# Patient Record
Sex: FEMALE | Race: OTHER | HISPANIC OR LATINO
[De-identification: names, ages, dates, MRNs, and addresses within clinical notes are randomized per-mention and may not be internally consistent; named-entity substitution may affect disease eponyms.]

---

## 2020-03-25 ENCOUNTER — NON-APPOINTMENT (OUTPATIENT)
Age: 60
End: 2020-03-25

## 2020-03-25 PROBLEM — Z00.00 ENCOUNTER FOR PREVENTIVE HEALTH EXAMINATION: Status: ACTIVE | Noted: 2020-03-25

## 2020-05-01 ENCOUNTER — APPOINTMENT (OUTPATIENT)
Dept: RHEUMATOLOGY | Facility: CLINIC | Age: 60
End: 2020-05-01
Payer: MEDICARE

## 2020-05-01 PROCEDURE — 99214 OFFICE O/P EST MOD 30 MIN: CPT | Mod: 95

## 2020-05-03 RX ORDER — OXYCODONE HYDROCHLORIDE 5 MG/1
CAPSULE ORAL
Refills: 0 | Status: ACTIVE | COMMUNITY

## 2020-05-03 NOTE — HISTORY OF PRESENT ILLNESS
[Home] : at home, [unfilled] , at the time of the visit. [Other Location: e.g. Home (Enter Location, City,State)___] : at [unfilled] [Patient] : the patient [Self] : self [FreeTextEntry1] : Discussed with patient.  You have chosen to receive care through the use of tele-media.  Telemedia enables health care providers at different locations to provide safe, effective, and convenient care through the use of technology.  Please note this is a billable encounter.  Patient understands that I cannot perform a physical examine and that patient may need to come to the clinic to complete the assessment.  Patient agreed verbally to to understanding the risks of benefits of telemedia as explained.\par \par 60 year old woman with history of RA, seen as follow Up reestablish care.  Patient was previously seen more than six months ago.  Since that time, has been following with pain management.  She continue plaquenil and sulfasalazine but not regularly.  Had some difficulty with refill of plaquenil due to use for coronavirus. \par \par Pain in the hands, shoulder pain\par Had accident about 1-2 months,  when something fell on her shoulder.  he was seen in urgent care, had xray, no fracture noted.  Still with pain in the right shoulder, feels pain with abduction of the right shoulder.  Sometimes hard to sleep, sometimes feels some paresthesias in the hands.  \par Has follow up with pain management Tuesday, will discuss the shoulder pain with doctor.  \par Started taking prednisone every other day which has helped\par Feels stiffness and pain in the hands and shoulders\par Patient waiting for approval for spinal stimulator for pain control. \par \par In past, patient was treated with rituxan and decadron (reaction to iv solumedrol), last dose more than one year ago.

## 2020-05-03 NOTE — PHYSICAL EXAM
[General Appearance - Alert] : alert [General Appearance - In No Acute Distress] : in no acute distress [General Appearance - Well Nourished] : well nourished [General Appearance - Well Developed] : well developed [General Appearance - Well-Appearing] : healthy appearing [] : no respiratory distress [Respiration, Rhythm And Depth] : normal respiratory rhythm and effort [Exaggerated Use Of Accessory Muscles For Inspiration] : no accessory muscle use [Oriented To Time, Place, And Person] : oriented to person, place, and time [Impaired Insight] : insight and judgment were intact [Affect] : the affect was normal [Mood] : the mood was normal [FreeTextEntry1] : pain with ROM of the right shoulder both abduction and adduction

## 2020-05-03 NOTE — ASSESSMENT
[FreeTextEntry1] : 60 year old woman with history of RA and ostearthritis, chronic back pain, present to reestablish via virtual visit due to current coronavirus pandemic.  Patient has been taking Plaquenil and sulfasalazine for RA, previously treated with rituximab, but not for about one year.  Feeling an increase in joint pains and stiffness.  Right shoulder pain since accident about 1-2 months ago, she will discuss with pain management next week.  Will continue plaquenil and sulfasalazine for now.  Continue low dose prednisone as well.  Patient will come in to office for blood tests next week.  Further management pending results.

## 2020-05-03 NOTE — REVIEW OF SYSTEMS
[Arthralgias] : arthralgias [Joint Pain] : joint pain [Joint Swelling] : joint swelling [Joint Stiffness] : joint stiffness [Negative] : Heme/Lymph

## 2020-05-11 ENCOUNTER — NON-APPOINTMENT (OUTPATIENT)
Age: 60
End: 2020-05-11

## 2020-05-15 ENCOUNTER — APPOINTMENT (OUTPATIENT)
Dept: RHEUMATOLOGY | Facility: CLINIC | Age: 60
End: 2020-05-15
Payer: MEDICARE

## 2020-05-15 DIAGNOSIS — Z13.1 ENCOUNTER FOR SCREENING FOR DIABETES MELLITUS: ICD-10-CM

## 2020-05-15 PROCEDURE — 36415 COLL VENOUS BLD VENIPUNCTURE: CPT

## 2020-05-16 ENCOUNTER — NON-APPOINTMENT (OUTPATIENT)
Age: 60
End: 2020-05-16

## 2020-05-18 LAB
ALBUMIN SERPL ELPH-MCNC: 4.3 G/DL
ALP BLD-CCNC: 114 U/L
ALT SERPL-CCNC: 31 U/L
ANION GAP SERPL CALC-SCNC: 12 MMOL/L
AST SERPL-CCNC: 27 U/L
BASOPHILS # BLD AUTO: 0.04 K/UL
BASOPHILS NFR BLD AUTO: 0.7 %
BILIRUB SERPL-MCNC: 0.4 MG/DL
BUN SERPL-MCNC: 13 MG/DL
CALCIUM SERPL-MCNC: 9.2 MG/DL
CCP AB SER IA-ACNC: <8 UNITS
CHLORIDE SERPL-SCNC: 104 MMOL/L
CO2 SERPL-SCNC: 25 MMOL/L
CREAT SERPL-MCNC: 0.83 MG/DL
CRP SERPL-MCNC: 0.2 MG/DL
EOSINOPHIL # BLD AUTO: 0.08 K/UL
EOSINOPHIL NFR BLD AUTO: 1.4 %
ERYTHROCYTE [SEDIMENTATION RATE] IN BLOOD BY WESTERGREN METHOD: 8 MM/HR
ESTIMATED AVERAGE GLUCOSE: 97 MG/DL
GLUCOSE SERPL-MCNC: 74 MG/DL
HBA1C MFR BLD HPLC: 5 %
HCT VFR BLD CALC: 41.9 %
HGB BLD-MCNC: 12.6 G/DL
IMM GRANULOCYTES NFR BLD AUTO: 0.2 %
LYMPHOCYTES # BLD AUTO: 2.7 K/UL
LYMPHOCYTES NFR BLD AUTO: 46.1 %
MAN DIFF?: NORMAL
MCHC RBC-ENTMCNC: 29 PG
MCHC RBC-ENTMCNC: 30.1 GM/DL
MCV RBC AUTO: 96.5 FL
MONOCYTES # BLD AUTO: 0.52 K/UL
MONOCYTES NFR BLD AUTO: 8.9 %
NEUTROPHILS # BLD AUTO: 2.51 K/UL
NEUTROPHILS NFR BLD AUTO: 42.7 %
PLATELET # BLD AUTO: 177 K/UL
POTASSIUM SERPL-SCNC: 4 MMOL/L
PROT SERPL-MCNC: 6.6 G/DL
RBC # BLD: 4.34 M/UL
RBC # FLD: 13.8 %
RF+CCP IGG SER-IMP: NEGATIVE
RHEUMATOID FACT SER QL: <10 IU/ML
SODIUM SERPL-SCNC: 141 MMOL/L
WBC # FLD AUTO: 5.86 K/UL

## 2020-05-19 ENCOUNTER — APPOINTMENT (OUTPATIENT)
Dept: RHEUMATOLOGY | Facility: CLINIC | Age: 60
End: 2020-05-19
Payer: MEDICARE

## 2020-05-19 DIAGNOSIS — E78.5 HYPERLIPIDEMIA, UNSPECIFIED: ICD-10-CM

## 2020-05-19 DIAGNOSIS — I10 ESSENTIAL (PRIMARY) HYPERTENSION: ICD-10-CM

## 2020-05-19 LAB
M TB IFN-G BLD-IMP: NEGATIVE
QUANTIFERON TB PLUS MITOGEN MINUS NIL: 6.71 IU/ML
QUANTIFERON TB PLUS NIL: 0.02 IU/ML
QUANTIFERON TB PLUS TB1 MINUS NIL: 0 IU/ML
QUANTIFERON TB PLUS TB2 MINUS NIL: 0 IU/ML

## 2020-05-19 PROCEDURE — 99441: CPT

## 2020-05-19 RX ORDER — ATORVASTATIN CALCIUM 80 MG/1
TABLET, FILM COATED ORAL
Refills: 0 | Status: ACTIVE | COMMUNITY

## 2020-05-20 NOTE — ASSESSMENT
[FreeTextEntry1] : 60 year old woman with history of RA and ostearthritis, chronic back pain,  Patient has been taking Plaquenil and sulfasalazine for RA, previously treated called to review blood tests results.  Reviewed blood test results with rituximab, but not for about one year.  Feeling an increase in joint pains and stiffness, restarted plaquenil 200 mg bid, labs reviewed, will restart sulfasalazine 500 mg bid.

## 2020-05-20 NOTE — HISTORY OF PRESENT ILLNESS
[FreeTextEntry1] : May 19, 2020\par Patient called to discuss blood test results..  Reviewed results with patient.  Patient was concerned about possible diabetes, Hemoglobin A 1c 5.0%.  Restarted hydroxychloroquine 200 mg bid, no side effects noted.\par Discussed with patient.  You have chosen to receive care through the use of tele-media.  Telemedia enables health care providers at different locations to provide safe, effective, and convenient care through the use of technology.  Please note this is a billable encounter.  Patient understands that I cannot perform a physical examine and that patient may need to come to the clinic to complete the assessment.  Patient agreed verbally to to understanding the risks of benefits of telemedia as explained.\par

## 2020-05-20 NOTE — PHYSICAL EXAM
[] : normal voice and communication [Oriented To Time, Place, And Person] : oriented to person, place, and time [Impaired Insight] : insight and judgment were intact [Mood] : the mood was normal [FreeTextEntry1] : Limited due to telephone visit.

## 2020-05-22 ENCOUNTER — APPOINTMENT (OUTPATIENT)
Dept: RHEUMATOLOGY | Facility: CLINIC | Age: 60
End: 2020-05-22

## 2020-05-23 ENCOUNTER — TRANSCRIPTION ENCOUNTER (OUTPATIENT)
Age: 60
End: 2020-05-23

## 2020-06-21 ENCOUNTER — RX RENEWAL (OUTPATIENT)
Age: 60
End: 2020-06-21

## 2020-06-21 RX ORDER — HYDROCHLOROTHIAZIDE 12.5 MG/1
12.5 TABLET ORAL DAILY
Qty: 30 | Refills: 0 | Status: ACTIVE | COMMUNITY
Start: 2020-06-21 | End: 1900-01-01

## 2020-07-28 ENCOUNTER — APPOINTMENT (OUTPATIENT)
Dept: RHEUMATOLOGY | Facility: CLINIC | Age: 60
End: 2020-07-28

## 2020-09-09 ENCOUNTER — APPOINTMENT (OUTPATIENT)
Dept: RHEUMATOLOGY | Facility: CLINIC | Age: 60
End: 2020-09-09
Payer: MEDICARE

## 2020-09-09 VITALS
OXYGEN SATURATION: 98 % | DIASTOLIC BLOOD PRESSURE: 84 MMHG | SYSTOLIC BLOOD PRESSURE: 134 MMHG | BODY MASS INDEX: 29.06 KG/M2 | TEMPERATURE: 98.4 F | HEIGHT: 60 IN | WEIGHT: 148 LBS | HEART RATE: 66 BPM

## 2020-09-09 DIAGNOSIS — Z87.891 PERSONAL HISTORY OF NICOTINE DEPENDENCE: ICD-10-CM

## 2020-09-09 PROCEDURE — 99214 OFFICE O/P EST MOD 30 MIN: CPT

## 2020-09-10 NOTE — REVIEW OF SYSTEMS
[Abdominal Pain] : abdominal pain [Arthralgias] : arthralgias [Negative] : Heme/Lymph [FreeTextEntry7] : scheduled for hernia repair [FreeTextEntry9] : back pain

## 2020-09-10 NOTE — HISTORY OF PRESENT ILLNESS
[FreeTextEntry1] : Discussed with patient.  You have chosen to receive care through the use of tele-media.  Telemedia enables health care providers at different locations to provide safe, effective, and convenient care through the use of technology.  Please note this is a billable encounter.  Patient understands that I cannot perform a physical examine and that patient may need to come to the clinic to complete the assessment.  Patient agreed verbally to to understanding the risks of benefits of telemedia as explained.\par \par 60 year old woman with history of RA, seen as follow Up reestablish care.  Patient was previously seen more than six months ago.  Since that time, has been following with pain management.  She continue Plaquenil and sulfasalazine but not regularly.  Had some difficulty with refill of Plaquenil due to use for coronavirus. \par \par Pain in the hands, shoulder pain\par Had accident about 1-2 months,  when something fell on her shoulder. was seen in urgent care, had xray, no fracture noted.  Still with pain in the right shoulder, feels pain with abduction of the right shoulder.  Sometimes hard to sleep, sometimes feels some paresthesias in the hands.  \par Has follow up with pain management Tuesday, will discuss the shoulder pain with doctor.  \par Started taking prednisone every other day which has helped\par Feels stiffness and pain in the hands and shoulders\par Patient waiting for approval for spinal stimulator for pain control. \par \par In past, patient was treated with rituxan and decadron (reaction to iv solumedrol), last dose more than one year ago.\par \par September 9, 2020\par Patient returns for follow up\par Patient feeling well n terms of joint pain and stiffness, however main concern related to low back pain.\par Patient was staying with her daughter's family in [place], was lifting her granddaughter and felt pain in the abdominal and back.  Was seen in emergency room, and needs hernia repair surgery, scheduled next week at [facility].  Had all imaging completed in [place], she jc froward results for review.  She will also have pre op clearance next week with her medical doctor.  Labs competed in [place], not available to me at this time. \par Continues Plaquenil and sulfasalazine, no side effects noted. \par Patient requesting refill for Chantix until able to see PMD next week.\par Patient may be relocating to [place] and requests records to be sent to rheumatology practice there.

## 2020-09-10 NOTE — PHYSICAL EXAM
[General Appearance - Alert] : alert [General Appearance - In No Acute Distress] : in no acute distress [General Appearance - Well-Appearing] : healthy appearing [Sclera] : the sclera and conjunctiva were normal [Respiration, Rhythm And Depth] : normal respiratory rhythm and effort [Exaggerated Use Of Accessory Muscles For Inspiration] : no accessory muscle use [Abnormal Walk] : normal gait [Nail Clubbing] : no clubbing  or cyanosis of the fingernails [Motor Tone] : muscle strength and tone were normal [] : no rash [Skin Lesions] : no skin lesions [Skin Color & Pigmentation] : normal skin color and pigmentation [Affect] : the affect was normal [Oriented To Time, Place, And Person] : oriented to person, place, and time [Impaired Insight] : insight and judgment were intact [Mood] : the mood was normal [FreeTextEntry1] : enlargement and deviation of the MCP bilaterally with good handgrip bilaterally.  +enlargement of the PIP without tenderness.  good ROM of the elbows and minimal decrease ROM in the shoulders bilaterally.  Back exam limited by pain related to hernia.  Normal gait, uses cane for balance.

## 2020-09-10 NOTE — ASSESSMENT
[FreeTextEntry1] : 60 year old woman with history of seronegative RA, ostearthritis, chronic back pain, returns for follow up.  Main concern related to recent development of abdominal hernia, scheduled for surgical repair September 18 at [facility], will see PMD for medical clearance, she will forward blood test results to office for review.  Patient restarted plaquenil and sulfasalazine in May 2020 with good response, feeling well in terms of joint pain and stiffness.  Patient continues to follow with pain management as well.  Patient requests prescription for chantix for smoking cessation until sees PMD.  Will forward records to rheumatology practice in [place] at patient's request.  She will also obtain previous records from [facility] as well. \par \par \par [phone] fax\par phone: [phone]\par \par formerly [facility]

## 2020-10-06 ENCOUNTER — RX RENEWAL (OUTPATIENT)
Age: 60
End: 2020-10-06

## 2020-12-14 ENCOUNTER — RX RENEWAL (OUTPATIENT)
Age: 60
End: 2020-12-14

## 2020-12-15 ENCOUNTER — APPOINTMENT (OUTPATIENT)
Dept: RHEUMATOLOGY | Facility: CLINIC | Age: 60
End: 2020-12-15
Payer: MEDICARE

## 2020-12-15 VITALS
HEIGHT: 60 IN | TEMPERATURE: 98.2 F | DIASTOLIC BLOOD PRESSURE: 89 MMHG | WEIGHT: 140 LBS | OXYGEN SATURATION: 98 % | SYSTOLIC BLOOD PRESSURE: 137 MMHG | HEART RATE: 61 BPM | BODY MASS INDEX: 27.48 KG/M2

## 2020-12-15 PROCEDURE — 36415 COLL VENOUS BLD VENIPUNCTURE: CPT

## 2020-12-15 PROCEDURE — 99072 ADDL SUPL MATRL&STAF TM PHE: CPT

## 2020-12-15 PROCEDURE — 99213 OFFICE O/P EST LOW 20 MIN: CPT | Mod: 25

## 2020-12-16 LAB
ALBUMIN SERPL ELPH-MCNC: 4.7 G/DL
ALP BLD-CCNC: 122 U/L
ALT SERPL-CCNC: 25 U/L
ANION GAP SERPL CALC-SCNC: 13 MMOL/L
AST SERPL-CCNC: 24 U/L
BASOPHILS # BLD AUTO: 0.03 K/UL
BASOPHILS NFR BLD AUTO: 0.4 %
BILIRUB SERPL-MCNC: 0.3 MG/DL
BUN SERPL-MCNC: 17 MG/DL
CALCIUM SERPL-MCNC: 9.8 MG/DL
CHLORIDE SERPL-SCNC: 103 MMOL/L
CO2 SERPL-SCNC: 27 MMOL/L
CREAT SERPL-MCNC: 1 MG/DL
CRP SERPL-MCNC: <0.1 MG/DL
EOSINOPHIL # BLD AUTO: 0.13 K/UL
EOSINOPHIL NFR BLD AUTO: 1.8 %
ERYTHROCYTE [SEDIMENTATION RATE] IN BLOOD BY WESTERGREN METHOD: 12 MM/HR
GLUCOSE SERPL-MCNC: 85 MG/DL
HCT VFR BLD CALC: 44.9 %
HGB BLD-MCNC: 14 G/DL
IMM GRANULOCYTES NFR BLD AUTO: 0.3 %
LYMPHOCYTES # BLD AUTO: 3.04 K/UL
LYMPHOCYTES NFR BLD AUTO: 42.5 %
MAN DIFF?: NORMAL
MCHC RBC-ENTMCNC: 29.1 PG
MCHC RBC-ENTMCNC: 31.2 GM/DL
MCV RBC AUTO: 93.3 FL
MONOCYTES # BLD AUTO: 0.7 K/UL
MONOCYTES NFR BLD AUTO: 9.8 %
NEUTROPHILS # BLD AUTO: 3.24 K/UL
NEUTROPHILS NFR BLD AUTO: 45.2 %
PLATELET # BLD AUTO: 194 K/UL
POTASSIUM SERPL-SCNC: 4.5 MMOL/L
PROT SERPL-MCNC: 7.4 G/DL
RBC # BLD: 4.81 M/UL
RBC # FLD: 14.4 %
SODIUM SERPL-SCNC: 143 MMOL/L
WBC # FLD AUTO: 7.16 K/UL

## 2020-12-23 NOTE — HISTORY OF PRESENT ILLNESS
[FreeTextEntry1] : December 15, 2020\par Patient returns for follow up. \par Patient feeling well, no joint pains today.  Major concern related to chronic back pain, follows with pain management\par Patient had surgery for hernia repair and tolerated well.\par Patient planning to relocate to [place] as well\par No recent changes in medications\par \par September 9, 2020\par Discussed with patient.  You have chosen to receive care through the use of tele-media.  Telemedia enables health care providers at different locations to provide safe, effective, and convenient care through the use of technology.  Please note this is a billable encounter.  Patient understands that I cannot perform a physical examine and that patient may need to come to the clinic to complete the assessment.  Patient agreed verbally to to understanding the risks of benefits of telemedia as explained.\par \par 60 year old woman with history of RA, seen as follow Up reestablish care.  Patient was previously seen more than six months ago.  Since that time, has been following with pain management.  She continue Plaquenil and sulfasalazine but not regularly.  Had some difficulty with refill of Plaquenil due to use for coronavirus. \par \par Pain in the hands, shoulder pain\par Had accident about 1-2 months,  when something fell on her shoulder. was seen in urgent care, had xray, no fracture noted.  Still with pain in the right shoulder, feels pain with abduction of the right shoulder.  Sometimes hard to sleep, sometimes feels some paresthesias in the hands.  \par Has follow up with pain management Tuesday, will discuss the shoulder pain with doctor.  \par Started taking prednisone every other day which has helped\par Feels stiffness and pain in the hands and shoulders\par Patient waiting for approval for spinal stimulator for pain control. \par \par In past, patient was treated with rituxan and decadron (reaction to iv solumedrol), last dose more than one year ago.\par \par September 9, 2020\par Patient returns for follow up\par Patient feeling well n terms of joint pain and stiffness, however main concern related to low back pain.\par Patient was staying with her daughter's family in [place], was lifting her granddaughter and felt pain in the abdominal and back.  Was seen in emergency room, and needs hernia repair surgery, scheduled next week at [facility].  Had all imaging completed in [place], she jc froward results for review.  She will also have pre op clearance next week with her medical doctor.  Labs competed in [place], not available to me at this time. \par Continues Plaquenil and sulfasalazine, no side effects noted. \par Patient requesting refill for Chantix until able to see PMD next week.\par Patient may be relocating to [place] and requests records to be sent to rheumatology practice there.

## 2020-12-23 NOTE — ASSESSMENT
[FreeTextEntry1] : 60 year old woman with history of seronegative RA, ostearthritis, chronic back pain, returns for follow up.   Patient restarted plaquenil and sulfasalazine in May 2020 with good response, feeling well in terms of joint pain and stiffness.  Patient continues to follow with pain management for back pain as well.  Will repeat labs today.  Will continue plaquenil 200 mg qday and sulfasalazine 500 mg bid as well.  Patient is planning to relocate to [place] and will forward records to rheumatology practice in [place] at patient's request.  She will also obtain previous records from [place] as well. \par \par [phone] fax\par phone: [phone]\par [facility]

## 2020-12-23 NOTE — PHYSICAL EXAM
[General Appearance - Alert] : alert [General Appearance - In No Acute Distress] : in no acute distress [General Appearance - Well-Appearing] : healthy appearing [Respiration, Rhythm And Depth] : normal respiratory rhythm and effort [Exaggerated Use Of Accessory Muscles For Inspiration] : no accessory muscle use [Abnormal Walk] : normal gait [Nail Clubbing] : no clubbing  or cyanosis of the fingernails [Motor Tone] : muscle strength and tone were normal [] : no rash [Skin Lesions] : no skin lesions [Oriented To Time, Place, And Person] : oriented to person, place, and time [Impaired Insight] : insight and judgment were intact [Affect] : the affect was normal [Mood] : the mood was normal [FreeTextEntry1] : enlargement and deviation of the MCP bilaterally with good handgrip bilaterally.  +enlargement of the PIP without tenderness.  Good ROM of the elbows and minimal decrease ROM in the shoulders bilaterally.  Normal gait, uses cane for balance.

## 2021-01-05 ENCOUNTER — APPOINTMENT (OUTPATIENT)
Dept: GASTROENTEROLOGY | Facility: CLINIC | Age: 61
End: 2021-01-05

## 2021-02-23 ENCOUNTER — APPOINTMENT (OUTPATIENT)
Dept: RHEUMATOLOGY | Facility: CLINIC | Age: 61
End: 2021-02-23
Payer: MEDICARE

## 2021-02-23 VITALS
DIASTOLIC BLOOD PRESSURE: 72 MMHG | SYSTOLIC BLOOD PRESSURE: 124 MMHG | WEIGHT: 148 LBS | OXYGEN SATURATION: 98 % | BODY MASS INDEX: 29.06 KG/M2 | HEART RATE: 69 BPM | HEIGHT: 60 IN | TEMPERATURE: 98.1 F

## 2021-02-23 PROCEDURE — 99072 ADDL SUPL MATRL&STAF TM PHE: CPT

## 2021-02-23 PROCEDURE — 99213 OFFICE O/P EST LOW 20 MIN: CPT

## 2021-02-24 NOTE — HISTORY OF PRESENT ILLNESS
[FreeTextEntry1] : February 23, 2021\par Seen by new pain management doctor\par Has MRI scheduled for the cervical and lumbar spine\par Had reaction to steroid with last epidural, developed high blood pressure and headaches\par Mostly the neck and left upper extremity\par Patient is not interested in surgery at this time\par Joint pain controlled at this time\par \par December 15, 2020\par Patient returns for follow up. \par Patient feeling well, no joint pains today.  Major concern related to chronic back pain, follows with pain management\par Patient had surgery for hernia repair and tolerated well.\par Patient planning to relocate to [place] as well\par No recent changes in medications\par \par September 9, 2020\par Discussed with patient.  You have chosen to receive care through the use of tele-media.  Telemedia enables health care providers at different locations to provide safe, effective, and convenient care through the use of technology.  Please note this is a billable encounter.  Patient understands that I cannot perform a physical examine and that patient may need to come to the clinic to complete the assessment.  Patient agreed verbally to to understanding the risks of benefits of telemedia as explained.\par \par 60 year old woman with history of RA, seen as follow Up reestablish care.  Patient was previously seen more than six months ago.  Since that time, has been following with pain management.  She continue Plaquenil and sulfasalazine but not regularly.  Had some difficulty with refill of Plaquenil due to use for coronavirus. \par \par Pain in the hands, shoulder pain\par Had accident about 1-2 months,  when something fell on her shoulder. was seen in urgent care, had xray, no fracture noted.  Still with pain in the right shoulder, feels pain with abduction of the right shoulder.  Sometimes hard to sleep, sometimes feels some paresthesias in the hands.  \par Has follow up with pain management Tuesday, will discuss the shoulder pain with doctor.  \par Started taking prednisone every other day which has helped\par Feels stiffness and pain in the hands and shoulders\par Patient waiting for approval for spinal stimulator for pain control. \par \par In past, patient was treated with rituxan and decadron (reaction to iv solumedrol), last dose more than one year ago.\par \par September 9, 2020\par Patient returns for follow up\par Patient feeling well n terms of joint pain and stiffness, however main concern related to low back pain.\par Patient was staying with her daughter's family in [place], was lifting her granddaughter and felt pain in the abdominal and back.  Was seen in emergency room, and needs hernia repair surgery, scheduled next week at [facility].  Had all imaging completed in [place], she jc froward results for review.  She will also have pre op clearance next week with her medical doctor.  Labs competed in [place], not available to me at this time. \par Continues Plaquenil and sulfasalazine, no side effects noted. \par Patient requesting refill for Chantix until able to see PMD next week.\par Patient may be relocating to [place] and requests records to be sent to rheumatology practice there.

## 2021-02-24 NOTE — PHYSICAL EXAM
[General Appearance - Alert] : alert [General Appearance - In No Acute Distress] : in no acute distress [General Appearance - Well Nourished] : well nourished [General Appearance - Well Developed] : well developed [General Appearance - Well-Appearing] : healthy appearing [Sclera] : the sclera and conjunctiva were normal [Respiration, Rhythm And Depth] : normal respiratory rhythm and effort [Exaggerated Use Of Accessory Muscles For Inspiration] : no accessory muscle use [] : no rash [Skin Lesions] : no skin lesions [Oriented To Time, Place, And Person] : oriented to person, place, and time [Impaired Insight] : insight and judgment were intact [Affect] : the affect was normal [Mood] : the mood was normal [FreeTextEntry1] : ambulating with cane.

## 2021-05-20 ENCOUNTER — APPOINTMENT (OUTPATIENT)
Dept: RHEUMATOLOGY | Facility: CLINIC | Age: 61
End: 2021-05-20
Payer: MEDICARE

## 2021-05-20 VITALS
HEART RATE: 59 BPM | WEIGHT: 146 LBS | BODY MASS INDEX: 28.66 KG/M2 | SYSTOLIC BLOOD PRESSURE: 133 MMHG | OXYGEN SATURATION: 98 % | HEIGHT: 60 IN | TEMPERATURE: 97.8 F | DIASTOLIC BLOOD PRESSURE: 78 MMHG

## 2021-05-20 PROCEDURE — 99213 OFFICE O/P EST LOW 20 MIN: CPT

## 2021-05-22 NOTE — HISTORY OF PRESENT ILLNESS
[FreeTextEntry1] : May 20, 2021\par Pain in the right shoulder, for the past four months\par Feels hard to sleep\par Painful, seeing pain management, lowered dosage of pain medications which feels may also have made the pain worse.\par Feels pain in the spine and the shoulder \par Requesting MRI for further evaluation of the right shoulder.\par Had colonoscopy, three polyps, follow up in 6 months to 1 year\par \par Following closely with GI, worsening GI symptoms lately, delayed gastric emptying of both liquids and solids\par Worsening GERD\par Feels unable to sleep \par Unable to digest pills\par Right hand dominant\par Had shoulder xray 6-7 months ago, was in store when dropped something on the floor, bent down to pick something up and a display fell on the right shoulders, went to urgent care, no fractures at that time\par Was also told something abnormal on EKG and needs to follow with her cardiologist as well. \par \par February 23, 2021\par Seen by new pain management doctor\par Has MRI scheduled for the cervical and lumbar spine\par Had reaction to steroid with last epidural, developed high blood pressure and headaches\par Mostly the neck and left upper extremity\par Patient is not interested in surgery at this time\par Joint pain controlled at this time\par \par December 15, 2020\par Patient returns for follow up. \par Patient feeling well, no joint pains today.  Major concern related to chronic back pain, follows with pain management\par Patient had surgery for hernia repair and tolerated well.\par Patient planning to relocate to [place] as well\par No recent changes in medications\par \par September 9, 2020\par Discussed with patient.  You have chosen to receive care through the use of tele-media.  Telemedia enables health care providers at different locations to provide safe, effective, and convenient care through the use of technology.  Please note this is a billable encounter.  Patient understands that I cannot perform a physical examine and that patient may need to come to the clinic to complete the assessment.  Patient agreed verbally to to understanding the risks of benefits of telemedia as explained.\par \par 60 year old woman with history of RA, seen as follow Up reestablish care.  Patient was previously seen more than six months ago.  Since that time, has been following with pain management.  She continue Plaquenil and sulfasalazine but not regularly.  Had some difficulty with refill of Plaquenil due to use for coronavirus. \par \par Pain in the hands, shoulder pain\par Had accident about 1-2 months,  when something fell on her shoulder. was seen in urgent care, had xray, no fracture noted.  Still with pain in the right shoulder, feels pain with abduction of the right shoulder.  Sometimes hard to sleep, sometimes feels some paresthesias in the hands.  \par Has follow up with pain management Tuesday, will discuss the shoulder pain with doctor.  \par Started taking prednisone every other day which has helped\par Feels stiffness and pain in the hands and shoulders\par Patient waiting for approval for spinal stimulator for pain control. \par \par In past, patient was treated with rituxan and decadron (reaction to iv solumedrol), last dose more than one year ago.\par \par September 9, 2020\par Patient returns for follow up\par Patient feeling well n terms of joint pain and stiffness, however main concern related to low back pain.\par Patient was staying with her daughter's family in [place], was lifting her granddaughter and felt pain in the abdominal and back.  Was seen in emergency room, and needs hernia repair surgery, scheduled next week at [facility].  Had all imaging completed in [place], she jc froward results for review.  She will also have pre op clearance next week with her medical doctor.  Labs competed in [place], not available to me at this time. \par Continues Plaquenil and sulfasalazine, no side effects noted. \par Patient requesting refill for Chantix until able to see PMD next week.\par Patient may be relocating to [place] and requests records to be sent to rheumatology practice there.

## 2021-05-22 NOTE — ASSESSMENT
[FreeTextEntry1] : 61 year old woman with history of seronegative RA, ostearthritis, chronic back pain, returns for follow up.   At this time, patient main concern related to worsening right shoulder pain, started following injury about six months ago which has not improved.  Will check MRI of the right shoulder to rule out internal derangement.  Patient restarted plaquenil and sulfasalazine in May 2020 with good response, but now feels not digesting tablets due to GI issues.  Will hold medications for now, if arthritis becomes active, will change to injectable methotrexate. \par \par [phone] fax\par phone: [phone]\par [facility]

## 2021-05-22 NOTE — REVIEW OF SYSTEMS
[Abdominal Pain] : abdominal pain [Vomiting] : vomiting [Joint Pain] : joint pain [Negative] : Respiratory [FreeTextEntry9] : right shoulder

## 2021-05-22 NOTE — PHYSICAL EXAM
[Abnormal Walk] : normal gait [Nail Clubbing] : no clubbing  or cyanosis of the fingernails [FreeTextEntry1] : right shoulder with decreased ROM

## 2021-07-09 ENCOUNTER — APPOINTMENT (OUTPATIENT)
Dept: RHEUMATOLOGY | Facility: CLINIC | Age: 61
End: 2021-07-09

## 2021-07-21 ENCOUNTER — NON-APPOINTMENT (OUTPATIENT)
Age: 61
End: 2021-07-21

## 2021-07-21 ENCOUNTER — APPOINTMENT (OUTPATIENT)
Dept: RHEUMATOLOGY | Facility: CLINIC | Age: 61
End: 2021-07-21
Payer: MEDICARE

## 2021-07-21 VITALS
DIASTOLIC BLOOD PRESSURE: 77 MMHG | OXYGEN SATURATION: 95 % | BODY MASS INDEX: 28.47 KG/M2 | HEART RATE: 63 BPM | SYSTOLIC BLOOD PRESSURE: 112 MMHG | TEMPERATURE: 98.2 F | HEIGHT: 60 IN | WEIGHT: 145 LBS

## 2021-07-21 PROCEDURE — 99214 OFFICE O/P EST MOD 30 MIN: CPT | Mod: 25

## 2021-07-21 NOTE — ASSESSMENT
[FreeTextEntry1] : 61 year old woman with history of seronegative RA, ostearthritis, chronic back pain, returns for follow up.  At this time, patient's main concern related to worsening right shoulder pain, with recent MRI completed.  MRI of the right shoulder with SLAP 2 tear and low to moderate grade partial thickness tear of the infraspinatus tendon with mild bursitis, following closely with orthopedist, s/p steroid injection with limited benefit, starting PT.  Patient would like to restart plaquenil and sulfasalazine, now as GI issues resolved following recent procedure. Will update labs today in office, will check CBC, CMP, ESR, and CRP today. \par

## 2021-07-21 NOTE — PHYSICAL EXAM
[General Appearance - Alert] : alert [General Appearance - In No Acute Distress] : in no acute distress [General Appearance - Well-Appearing] : healthy appearing [Sclera] : the sclera and conjunctiva were normal [Respiration, Rhythm And Depth] : normal respiratory rhythm and effort [Exaggerated Use Of Accessory Muscles For Inspiration] : no accessory muscle use [Abnormal Walk] : normal gait [] : no rash [Skin Lesions] : no skin lesions [Oriented To Time, Place, And Person] : oriented to person, place, and time [Impaired Insight] : insight and judgment were intact [Affect] : the affect was normal [FreeTextEntry1] : ambulaters with cane.  Bilateral hands with minimal MCP subluxation, right more than left without tenderness over the MCP or PIP.  Decreased ROM of the right shoulder, good ROM of the left shoulder.

## 2021-07-21 NOTE — HISTORY OF PRESENT ILLNESS
[FreeTextEntry1] : July 21, 2021\par Had shoulder injection on the right, limited benefit\par Feels pain with ROM and with sleeping on the right side, feels worse at night\par Now taking short course of prednisone with unclear benefit\par Was seen seen in [address] [place] [address] by orthopedist on July 16th for second opinion\par Will start PT and if not improved, will consider surgery\par Had lap band replaced about two weeks ago via laparoscopic procedure at [facility]\par Feeling much better in terms of GI symptoms, able to tolerate food at this time\par \par May 20, 2021\par Pain in the right shoulder, for the past four months\par Feels hard to sleep\par Painful, seeing pain management, lowered dosage of pain medications which feels may also have made the pain worse.\par Feels pain in the spine and the shoulder \par Requesting MRI for further evaluation of the right shoulder.\par Had colonoscopy, three polyps, follow up in 6 months to 1 year\par \par Following closely with GI, worsening GI symptoms lately, delayed gastric emptying of both liquids and solids\par Worsening GERD\par Feels unable to sleep \par Unable to digest pills\par Right hand dominant\par Had shoulder xray 6-7 months ago, was in store when dropped something on the floor, bent down to pick something up and a display fell on the right shoulders, went to urgent care, no fractures at that time\par Was also told something abnormal on EKG and needs to follow with her cardiologist as well. \par \par February 23, 2021\par Seen by new pain management doctor\par Has MRI scheduled for the cervical and lumbar spine\par Had reaction to steroid with last epidural, developed high blood pressure and headaches\par Mostly the neck and left upper extremity\par Patient is not interested in surgery at this time\par Joint pain controlled at this time\par \par December 15, 2020\par Patient returns for follow up. \par Patient feeling well, no joint pains today.  Major concern related to chronic back pain, follows with pain management\par Patient had surgery for hernia repair and tolerated well.\par Patient planning to relocate to [place] as well\par No recent changes in medications\par \par September 9, 2020\par Discussed with patient.  You have chosen to receive care through the use of tele-media.  Telemedia enables health care providers at different locations to provide safe, effective, and convenient care through the use of technology.  Please note this is a billable encounter.  Patient understands that I cannot perform a physical examine and that patient may need to come to the clinic to complete the assessment.  Patient agreed verbally to to understanding the risks of benefits of telemedia as explained.\par \par 60 year old woman with history of RA, seen as follow Up reestablish care.  Patient was previously seen more than six months ago.  Since that time, has been following with pain management.  She continue Plaquenil and sulfasalazine but not regularly.  Had some difficulty with refill of Plaquenil due to use for coronavirus. \par \par Pain in the hands, shoulder pain\par Had accident about 1-2 months,  when something fell on her shoulder. was seen in urgent care, had xray, no fracture noted.  Still with pain in the right shoulder, feels pain with abduction of the right shoulder.  Sometimes hard to sleep, sometimes feels some paresthesias in the hands.  \par Has follow up with pain management Tuesday, will discuss the shoulder pain with doctor.  \par Started taking prednisone every other day which has helped\par Feels stiffness and pain in the hands and shoulders\par Patient waiting for approval for spinal stimulator for pain control. \par \par In past, patient was treated with rituxan and decadron (reaction to iv solumedrol), last dose more than one year ago.\par \par September 9, 2020\par Patient returns for follow up\par Patient feeling well n terms of joint pain and stiffness, however main concern related to low back pain.\par Patient was staying with her daughter's family in [place], was lifting her granddaughter and felt pain in the abdominal and back.  Was seen in emergency room, and needs hernia repair surgery, scheduled next week at [facility].  Had all imaging completed in [place], she jc [facility] results for review.  She will also have pre op clearance next week with her medical doctor.  Labs competed in [place], not available to me at this time. \par Continues Plaquenil and sulfasalazine, no side effects noted. \par Patient requesting refill for Chantix until able to see PMD next week.\par Patient may be relocating to [place] and requests records to be sent to rheumatology practice there.

## 2021-07-22 LAB
ALBUMIN SERPL ELPH-MCNC: 4.6 G/DL
ALP BLD-CCNC: 116 U/L
ALT SERPL-CCNC: 26 U/L
ANION GAP SERPL CALC-SCNC: 14 MMOL/L
AST SERPL-CCNC: 29 U/L
BASOPHILS # BLD AUTO: 0.05 K/UL
BASOPHILS NFR BLD AUTO: 0.6 %
BILIRUB SERPL-MCNC: 0.4 MG/DL
BUN SERPL-MCNC: 13 MG/DL
CALCIUM SERPL-MCNC: 9.9 MG/DL
CHLORIDE SERPL-SCNC: 107 MMOL/L
CO2 SERPL-SCNC: 23 MMOL/L
CREAT SERPL-MCNC: 0.72 MG/DL
CRP SERPL-MCNC: <3 MG/L
EOSINOPHIL # BLD AUTO: 0.35 K/UL
EOSINOPHIL NFR BLD AUTO: 4.2 %
ERYTHROCYTE [SEDIMENTATION RATE] IN BLOOD BY WESTERGREN METHOD: 5 MM/HR
GLUCOSE SERPL-MCNC: 83 MG/DL
HCT VFR BLD CALC: 43.8 %
HGB BLD-MCNC: 13.4 G/DL
IMM GRANULOCYTES NFR BLD AUTO: 0.2 %
LYMPHOCYTES # BLD AUTO: 3.77 K/UL
LYMPHOCYTES NFR BLD AUTO: 45.1 %
MAN DIFF?: NORMAL
MCHC RBC-ENTMCNC: 29.3 PG
MCHC RBC-ENTMCNC: 30.6 GM/DL
MCV RBC AUTO: 95.8 FL
MONOCYTES # BLD AUTO: 0.67 K/UL
MONOCYTES NFR BLD AUTO: 8 %
NEUTROPHILS # BLD AUTO: 3.49 K/UL
NEUTROPHILS NFR BLD AUTO: 41.9 %
PLATELET # BLD AUTO: 188 K/UL
POTASSIUM SERPL-SCNC: 4 MMOL/L
PROT SERPL-MCNC: 7 G/DL
RBC # BLD: 4.57 M/UL
RBC # FLD: 14.7 %
SODIUM SERPL-SCNC: 144 MMOL/L
WBC # FLD AUTO: 8.35 K/UL

## 2021-10-20 ENCOUNTER — APPOINTMENT (OUTPATIENT)
Dept: RHEUMATOLOGY | Facility: CLINIC | Age: 61
End: 2021-10-20
Payer: MEDICARE

## 2021-10-20 VITALS
HEART RATE: 68 BPM | BODY MASS INDEX: 29.25 KG/M2 | OXYGEN SATURATION: 96 % | TEMPERATURE: 97.7 F | DIASTOLIC BLOOD PRESSURE: 83 MMHG | SYSTOLIC BLOOD PRESSURE: 133 MMHG | WEIGHT: 149 LBS | HEIGHT: 60 IN

## 2021-10-20 PROCEDURE — 99213 OFFICE O/P EST LOW 20 MIN: CPT

## 2021-10-20 RX ORDER — VARENICLINE TARTRATE 1 MG/1
1 TABLET, FILM COATED ORAL TWICE DAILY
Qty: 28 | Refills: 0 | Status: COMPLETED | COMMUNITY
Start: 2020-09-09 | End: 2021-10-20

## 2021-10-20 NOTE — DATA REVIEWED
[FreeTextEntry1] : Labs from PMD visit:\par 10/15/2021\par Vitamin D 15.6\par CMP AST 25, ALT 40\par CBC: normla\par TSH 2.02

## 2021-10-20 NOTE — ASSESSMENT
[FreeTextEntry1] : 61 year old woman with history of seronegative RA, ostearthritis, chronic back pain, returns for follow up.  At this time, patient's main concern related to right shoulder pain s/p recent right shoulder surgery at [facility] in early September.  Still with decreased ROM of the right shoulder secondary to pain, not currently in PT as felt worsened her pain, orthopedist also discussed possible shoulder replacement in the future with patient.  Patient restarted plaquenil and sulfasalazine following last visit, no GI side effects noted. Will continue current regimen, follow up with pain management.  Recent labs reviewed with patient on phone.  Patient will follow up in three months or sooner as needed if symptoms change or worsen.\par

## 2021-10-20 NOTE — PHYSICAL EXAM
[General Appearance - Alert] : alert [General Appearance - In No Acute Distress] : in no acute distress [General Appearance - Well-Appearing] : healthy appearing [Sclera] : the sclera and conjunctiva were normal [Respiration, Rhythm And Depth] : normal respiratory rhythm and effort [Exaggerated Use Of Accessory Muscles For Inspiration] : no accessory muscle use [Abnormal Walk] : normal gait [Nail Clubbing] : no clubbing  or cyanosis of the fingernails [Musculoskeletal - Swelling] : no joint swelling seen [] : no rash [Skin Lesions] : no skin lesions [Oriented To Time, Place, And Person] : oriented to person, place, and time [Impaired Insight] : insight and judgment were intact [Affect] : the affect was normal [FreeTextEntry1] : ambulates with cane.  Decreased RoM of the right shoulder.  enlargement of the right MCPs with some subluxation of the right more than left.

## 2021-10-20 NOTE — HISTORY OF PRESENT ILLNESS
[FreeTextEntry1] : October 20, 2021\par Had blood tests completed, annual exam last week with PMD\par Had flu vaccine with PMD\par Scheduled for October 25 for Covid booster\par Had right shoulder early September 2021 at [facility]\par No PT at present following surgery as felt the PT made the pan worse, continues home exercises as tolerated\par No changes in medications since seeing medical doctor except added vitamin d \par Plaquenil 200 mg bid\par Sees pain management, had an epidural which helped a little bit, doesn't last too long\par Walking for exercise, about 20 blocks per day, on good, otherwise about 8-10\par Mammogram scheduled\par \par July 21, 2021\par Had shoulder injection on the right, limited benefit\par Feels pain with ROM and with sleeping on the right side, feels worse at night\par Now taking short course of prednisone with unclear benefit\par Was seen seen in [address] [place] [address] by orthopedist on July 16th for second opinion\par Will start PT and if not improved, will consider surgery\par Had lap band replaced about two weeks ago via laparoscopic procedure at [facility]\par Feeling much better in terms of GI symptoms, able to tolerate food at this time\par \par May 20, 2021\par Pain in the right shoulder, for the past four months\par Feels hard to sleep\par Painful, seeing pain management, lowered dosage of pain medications which feels may also have made the pain worse.\par Feels pain in the spine and the shoulder \par Requesting MRI for further evaluation of the right shoulder.\par Had colonoscopy, three polyps, follow up in 6 months to 1 year\par \par Following closely with GI, worsening GI symptoms lately, delayed gastric emptying of both liquids and solids\par Worsening GERD\par Feels unable to sleep \par Unable to digest pills\par Right hand dominant\par Had shoulder xray 6-7 months ago, was in store when dropped something on the floor, bent down to pick something up and a display fell on the right shoulders, went to urgent care, no fractures at that time\par Was also told something abnormal on EKG and needs to follow with her cardiologist as well. \par \par February 23, 2021\par Seen by new pain management doctor\par Has MRI scheduled for the cervical and lumbar spine\par Had reaction to steroid with last epidural, developed high blood pressure and headaches\par Mostly the neck and left upper extremity\par Patient is not interested in surgery at this time\par Joint pain controlled at this time\par \par December 15, 2020\par Patient returns for follow up. \par Patient feeling well, no joint pains today.  Major concern related to chronic back pain, follows with pain management\par Patient had surgery for hernia repair and tolerated well.\par Patient planning to relocate to [place] as well\par No recent changes in medications\par \par September 9, 2020\par Discussed with patient.  You have chosen to receive care through the use of tele-media.  Telemedia enables health care providers at different locations to provide safe, effective, and convenient care through the use of technology.  Please note this is a billable encounter.  Patient understands that I cannot perform a physical examine and that patient may need to come to the clinic to complete the assessment.  Patient agreed verbally to to understanding the risks of benefits of telemedia as explained.\par \par 60 year old woman with history of RA, seen as follow Up reestablish care.  Patient was previously seen more than six months ago.  Since that time, has been following with pain management.  She continue Plaquenil and sulfasalazine but not regularly.  Had some difficulty with refill of Plaquenil due to use for coronavirus. \par \par Pain in the hands, shoulder pain\par Had accident about 1-2 months,  when something fell on her shoulder. was seen in urgent care, had xray, no fracture noted.  Still with pain in the right shoulder, feels pain with abduction of the right shoulder.  Sometimes hard to sleep, sometimes feels some paresthesias in the hands.  \par Has follow up with pain management Tuesday, will discuss the shoulder pain with doctor.  \par Started taking prednisone every other day which has helped\par Feels stiffness and pain in the hands and shoulders\par Patient waiting for approval for spinal stimulator for pain control. \par \par In past, patient was treated with rituxan and decadron (reaction to iv solumedrol), last dose more than one year ago.\par \par September 9, 2020\par Patient returns for follow up\par Patient feeling well n terms of joint pain and stiffness, however main concern related to low back pain.\par Patient was staying with her daughter's family in [place], was lifting her granddaughter and felt pain in the abdominal and back.  Was seen in emergency room, and needs hernia repair surgery, scheduled next week at [facility].  Had all imaging completed in [place], she jc [facility] results for review.  She will also have pre op clearance next week with her medical doctor.  Labs competed in [place], not available to me at this time. \par Continues Plaquenil and sulfasalazine, no side effects noted. \par Patient requesting refill for Chantix until able to see PMD next week.\par Patient may be relocating to [place] and requests records to be sent to rheumatology practice there.

## 2022-01-13 RX ORDER — PREDNISONE 10 MG/1
10 TABLET ORAL
Qty: 14 | Refills: 0 | Status: ACTIVE | COMMUNITY
Start: 2022-01-13 | End: 1900-01-01

## 2022-01-18 ENCOUNTER — APPOINTMENT (OUTPATIENT)
Dept: RHEUMATOLOGY | Facility: CLINIC | Age: 62
End: 2022-01-18

## 2022-01-26 ENCOUNTER — APPOINTMENT (OUTPATIENT)
Dept: RHEUMATOLOGY | Facility: CLINIC | Age: 62
End: 2022-01-26
Payer: MEDICARE

## 2022-01-26 ENCOUNTER — LABORATORY RESULT (OUTPATIENT)
Age: 62
End: 2022-01-26

## 2022-01-26 VITALS
TEMPERATURE: 97.9 F | WEIGHT: 157 LBS | DIASTOLIC BLOOD PRESSURE: 82 MMHG | SYSTOLIC BLOOD PRESSURE: 132 MMHG | HEIGHT: 60 IN | BODY MASS INDEX: 30.82 KG/M2 | HEART RATE: 82 BPM | OXYGEN SATURATION: 96 %

## 2022-01-26 PROCEDURE — 99213 OFFICE O/P EST LOW 20 MIN: CPT | Mod: 25

## 2022-01-26 RX ORDER — ERGOCALCIFEROL (VITAMIN D2) 1250 MCG
50000 CAPSULE ORAL
Refills: 0 | Status: ACTIVE | COMMUNITY

## 2022-01-26 NOTE — ASSESSMENT
[FreeTextEntry1] : 61 year old woman with history of seronegative RA, ostearthritis, chronic back pain, returns for follow up.  At this time, patient's main concern related to right shoulder pain s/p recent right shoulder surgery at [facility] in early September 2021, scheduled for injection in March 2022, still with decreased ROM of the right shoulder secondary to pain, not currently in PT as felt worsened her pain, orthopedist also discussed possible need for shoulder replacement in the future with patient. Patient concerned about increasing symptoms of arthritis, will check vectra DA today, will discuss change in regimen pending results,  Will also check CBC, ESR, CRP, RF, and anti CCP. today.  Given severe vitamin D deficiency, will also check B12 as well.  Patient continues plaquenil and sulfasalazine, no GI side effects noted. Will continue current regimen pending vectra results. Follow up with pain management.  Recent labs reviewed with patient on phone.  Patient will follow up in three months or sooner as needed if symptoms change or worsen.\par

## 2022-01-26 NOTE — PHYSICAL EXAM
[General Appearance - Alert] : alert [General Appearance - In No Acute Distress] : in no acute distress [General Appearance - Well-Appearing] : healthy appearing [Sclera] : the sclera and conjunctiva were normal [Respiration, Rhythm And Depth] : normal respiratory rhythm and effort [Exaggerated Use Of Accessory Muscles For Inspiration] : no accessory muscle use [] : no rash [Oriented To Time, Place, And Person] : oriented to person, place, and time [Impaired Insight] : insight and judgment were intact [Affect] : the affect was normal [FreeTextEntry1] : Ambulates with cane.  Decreased handgrip strength bilaterally. edema of the MCPS bilaterally.   Decreased ROm of the right shoulder

## 2022-01-26 NOTE — DATA REVIEWED
[FreeTextEntry1] : Labs from PMD visit:\par 10/15/2021\par Vitamin D 15.6\par CMP AST 25, ALT 40\par CBC: normal\par TSH 2.02\par \par 1/14/2022\par Vitamin 8.6\par cmp normal except alt 40\par ast 22\par

## 2022-01-27 LAB
BASOPHILS # BLD AUTO: 0.04 K/UL
BASOPHILS NFR BLD AUTO: 0.4 %
CRP SERPL-MCNC: <3 MG/L
EOSINOPHIL # BLD AUTO: 0.07 K/UL
EOSINOPHIL NFR BLD AUTO: 0.7 %
ERYTHROCYTE [SEDIMENTATION RATE] IN BLOOD BY WESTERGREN METHOD: 15 MM/HR
HCT VFR BLD CALC: 43 %
HGB BLD-MCNC: 13.4 G/DL
IMM GRANULOCYTES NFR BLD AUTO: 0.2 %
LYMPHOCYTES # BLD AUTO: 3.36 K/UL
LYMPHOCYTES NFR BLD AUTO: 35 %
MAN DIFF?: NORMAL
MCHC RBC-ENTMCNC: 30 PG
MCHC RBC-ENTMCNC: 31.2 GM/DL
MCV RBC AUTO: 96.2 FL
MONOCYTES # BLD AUTO: 0.71 K/UL
MONOCYTES NFR BLD AUTO: 7.4 %
NEUTROPHILS # BLD AUTO: 5.39 K/UL
NEUTROPHILS NFR BLD AUTO: 56.3 %
PLATELET # BLD AUTO: 193 K/UL
RBC # BLD: 4.47 M/UL
RBC # FLD: 13.6 %
RHEUMATOID FACT SER QL: <10 IU/ML
WBC # FLD AUTO: 9.59 K/UL

## 2022-01-28 LAB
CCP AB SER IA-ACNC: <8 UNITS
RF+CCP IGG SER-IMP: NEGATIVE

## 2022-03-07 ENCOUNTER — APPOINTMENT (OUTPATIENT)
Dept: RHEUMATOLOGY | Facility: CLINIC | Age: 62
End: 2022-03-07

## 2022-05-16 ENCOUNTER — APPOINTMENT (OUTPATIENT)
Dept: RHEUMATOLOGY | Facility: CLINIC | Age: 62
End: 2022-05-16
Payer: MEDICARE

## 2022-05-16 VITALS
HEART RATE: 59 BPM | SYSTOLIC BLOOD PRESSURE: 104 MMHG | OXYGEN SATURATION: 96 % | HEIGHT: 60 IN | WEIGHT: 160 LBS | BODY MASS INDEX: 31.41 KG/M2 | TEMPERATURE: 98 F | DIASTOLIC BLOOD PRESSURE: 62 MMHG

## 2022-05-16 DIAGNOSIS — E55.9 VITAMIN D DEFICIENCY, UNSPECIFIED: ICD-10-CM

## 2022-05-16 DIAGNOSIS — M25.511 PAIN IN RIGHT SHOULDER: ICD-10-CM

## 2022-05-16 PROCEDURE — 99214 OFFICE O/P EST MOD 30 MIN: CPT

## 2022-05-16 NOTE — REVIEW OF SYSTEMS
[Negative] : Heme/Lymph [FreeTextEntry9] : right foot s/p surgery, in boot until see surgeon for follow up.

## 2022-05-16 NOTE — ASSESSMENT
[FreeTextEntry1] : 62 year old woman with history of seronegative RA, ostearthritis, chronic back pain, returns for follow up.  Patient with recent foot surgery two weeks ago on the right foot, awaiting left foot surgery as well for bunionectomy. Patient with right shoulder surgery at [facility] in early September 2021, scheduled for injection in March 2022, still with decreased ROM of the right shoulder secondary to pain, not currently in PT as felt worsened her pain, orthopedist also discussed possible need for shoulder replacement in the future with patient. Patient with recent vectra of 16. Will continue current regimen of plaquenil 200 mg bid and  mg bid.  Continues vitamin D daily supplement as completed 46190 mgm dose weekly Follow up with pain management.  Patient will follow up in three months or sooner as needed if symptoms change or worsen.\par

## 2022-05-16 NOTE — HISTORY OF PRESENT ILLNESS
[FreeTextEntry1] : May 16, 2022\par Since last visit, had surgery on the right foot, about two weeks ago, doing well\par Will have follow up Friday to remove the pin \par Feels edema of the foot, but otherwise doing well\par Follow up at [place], Dr. Abilio Stein\par Will have the other foot completed next week\par Chronic right shoulder pain, Arthroscopic surgery 9/21/2021, has some decrease in ROM but without pain\par No new medicines, had new medication for pain after surgery, but not taking at this time\par Using more gabapentin for the foot pain\par Had blood tests before surgery, reports normal\par Continues daily vitamin D after completing high dose weekly\par Intermittent hand pain, continues plaquenil 200 mg bid and  mg bid\par \par January 26, 2022\par Patient returns for follow up\par Feeling increase in pain recently\par right shoulder and back\par Feels stiffness in the hands in the morning as well\par Took prednisone x 2 doses this week with decrease in pain from 10/10 to 4/10\par will have injections for the right shoulder in March 2022\par Discussed vectra DA as well\par Blood tests with PMD 1/14/2022 vitamin d 8, started on vitamin D 45312 per week\par GI symptoms much improved\par \par October 20, 2021\par Had blood tests completed, annual exam last week with PMD\par Had flu vaccine with PMD\par Scheduled for October 25 for Covid booster\par Had right shoulder early September 2021 at [facility]\par No PT at present following surgery as felt the PT made the pan worse, continues home exercises as tolerated\par No changes in medications since seeing medical doctor except added vitamin d \par Plaquenil 200 mg bid\par Sees pain management, had an epidural which helped a little bit, doesn't last too long\par Walking for exercise, about 20 blocks per day, on good, otherwise about 8-10\par Mammogram scheduled\par \par July 21, 2021\par Had shoulder injection on the right, limited benefit\par Feels pain with ROM and with sleeping on the right side, feels worse at night\par Now taking short course of prednisone with unclear benefit\par Was seen seen in [address] [place] [address] by orthopedist on July 16th for second opinion\par Will start PT and if not improved, will consider surgery\par Had lap band replaced about two weeks ago via laparoscopic procedure at [facility]\par Feeling much better in terms of GI symptoms, able to tolerate food at this time\par \par May 20, 2021\par Pain in the right shoulder, for the past four months\par Feels hard to sleep\par Painful, seeing pain management, lowered dosage of pain medications which feels may also have made the pain worse.\par Feels pain in the spine and the shoulder \par Requesting MRI for further evaluation of the right shoulder.\par Had colonoscopy, three polyps, follow up in 6 months to 1 year\par \par Following closely with GI, worsening GI symptoms lately, delayed gastric emptying of both liquids and solids\par Worsening GERD\par Feels unable to sleep \par Unable to digest pills\par Right hand dominant\par Had shoulder xray 6-7 months ago, was in store when dropped something on the floor, bent down to pick something up and a display fell on the right shoulders, went to urgent care, no fractures at that time\par Was also told something abnormal on EKG and needs to follow with her cardiologist as well. \par \par February 23, 2021\par Seen by new pain management doctor\par Has MRI scheduled for the cervical and lumbar spine\par Had reaction to steroid with last epidural, developed high blood pressure and headaches\par Mostly the neck and left upper extremity\par Patient is not interested in surgery at this time\par Joint pain controlled at this time\par \par December 15, 2020\par Patient returns for follow up. \par Patient feeling well, no joint pains today.  Major concern related to chronic back pain, follows with pain management\par Patient had surgery for hernia repair and tolerated well.\par Patient planning to relocate to [place] as well\par No recent changes in medications\par \par September 9, 2020\par Discussed with patient.  You have chosen to receive care through the use of tele-media.  Telemedia enables health care providers at different locations to provide safe, effective, and convenient care through the use of technology.  Please note this is a billable encounter.  Patient understands that I cannot perform a physical examine and that patient may need to come to the clinic to complete the assessment.  Patient agreed verbally to to understanding the risks of benefits of telemedia as explained.\par \par 60 year old woman with history of RA, seen as follow Up reestablish care.  Patient was previously seen more than six months ago.  Since that time, has been following with pain management.  She continue Plaquenil and sulfasalazine but not regularly.  Had some difficulty with refill of Plaquenil due to use for coronavirus. \par \par Pain in the hands, shoulder pain\par Had accident about 1-2 months,  when something fell on her shoulder. was seen in urgent care, had xray, no fracture noted.  Still with pain in the right shoulder, feels pain with abduction of the right shoulder.  Sometimes hard to sleep, sometimes feels some paresthesias in the hands.  \par Has follow up with pain management Tuesday, will discuss the shoulder pain with doctor.  \par Started taking prednisone every other day which has helped\par Feels stiffness and pain in the hands and shoulders\par Patient waiting for approval for spinal stimulator for pain control. \par \par In past, patient was treated with rituxan and decadron (reaction to iv solumedrol), last dose more than one year ago.\par \par September 9, 2020\par Patient returns for follow up\par Patient feeling well n terms of joint pain and stiffness, however main concern related to low back pain.\par Patient was staying with her daughter's family in [place], was lifting her granddaughter and felt pain in the abdominal and back.  Was seen in emergency room, and needs hernia repair surgery, scheduled next week at [facility].  Had all imaging completed in [place], she jc [facility] results for review.  She will also have pre op clearance next week with her medical doctor.  Labs competed in [place], not available to me at this time. \par Continues Plaquenil and sulfasalazine, no side effects noted. \par Patient requesting refill for Chantix until able to see PMD next week.\par Patient may be relocating to [place] and requests records to be sent to rheumatology practice there.

## 2022-05-16 NOTE — DATA REVIEWED
[FreeTextEntry1] : Labs from PMD visit:\par 10/15/2021\par Vitamin D 15.6\par CMP AST 25, ALT 40\par CBC: normal\par TSH 2.02\par \par 1/14/2022\par Vitamin 8.6\par cmp normal except alt 40\par ast 22\par \par 1/2022 \par Vectra 16

## 2022-05-16 NOTE — PHYSICAL EXAM
[General Appearance - Alert] : alert [General Appearance - In No Acute Distress] : in no acute distress [General Appearance - Well-Appearing] : healthy appearing [Sclera] : the sclera and conjunctiva were normal [Respiration, Rhythm And Depth] : normal respiratory rhythm and effort [Exaggerated Use Of Accessory Muscles For Inspiration] : no accessory muscle use [] : no rash [Oriented To Time, Place, And Person] : oriented to person, place, and time [Impaired Insight] : insight and judgment were intact [Affect] : the affect was normal [FreeTextEntry1] : boot on right lower extremity. Enlargement of the bilateral MCP, right more than lef with good handgrip strength bilaterally.  Decrease in ROM of the right shoulder unchanged from previous. Principal Discharge DX:	COPD exacerbation

## 2022-09-20 ENCOUNTER — APPOINTMENT (OUTPATIENT)
Dept: RHEUMATOLOGY | Facility: CLINIC | Age: 62
End: 2022-09-20

## 2022-09-20 VITALS
OXYGEN SATURATION: 98 % | HEART RATE: 75 BPM | BODY MASS INDEX: 32.2 KG/M2 | HEIGHT: 60 IN | WEIGHT: 164 LBS | TEMPERATURE: 97.4 F | DIASTOLIC BLOOD PRESSURE: 91 MMHG | SYSTOLIC BLOOD PRESSURE: 150 MMHG

## 2022-09-20 PROCEDURE — 36415 COLL VENOUS BLD VENIPUNCTURE: CPT

## 2022-09-20 PROCEDURE — 99214 OFFICE O/P EST MOD 30 MIN: CPT | Mod: 25

## 2022-09-21 LAB
ALBUMIN SERPL ELPH-MCNC: 4.8 G/DL
ALP BLD-CCNC: 134 U/L
ALT SERPL-CCNC: 29 U/L
ANION GAP SERPL CALC-SCNC: 11 MMOL/L
AST SERPL-CCNC: 25 U/L
BASOPHILS # BLD AUTO: 0.05 K/UL
BASOPHILS NFR BLD AUTO: 0.7 %
BILIRUB SERPL-MCNC: 0.4 MG/DL
BUN SERPL-MCNC: 14 MG/DL
CALCIUM SERPL-MCNC: 9.6 MG/DL
CHLORIDE SERPL-SCNC: 109 MMOL/L
CO2 SERPL-SCNC: 26 MMOL/L
CREAT SERPL-MCNC: 0.79 MG/DL
CRP SERPL-MCNC: <3 MG/L
EGFR: 84 ML/MIN/1.73M2
EOSINOPHIL # BLD AUTO: 0.11 K/UL
EOSINOPHIL NFR BLD AUTO: 1.5 %
ERYTHROCYTE [SEDIMENTATION RATE] IN BLOOD BY WESTERGREN METHOD: 5 MM/HR
GLUCOSE SERPL-MCNC: 67 MG/DL
HCT VFR BLD CALC: 43.5 %
HGB BLD-MCNC: 13.2 G/DL
IMM GRANULOCYTES NFR BLD AUTO: 0.1 %
LYMPHOCYTES # BLD AUTO: 3.14 K/UL
LYMPHOCYTES NFR BLD AUTO: 42.8 %
MAN DIFF?: NORMAL
MCHC RBC-ENTMCNC: 29.4 PG
MCHC RBC-ENTMCNC: 30.3 GM/DL
MCV RBC AUTO: 96.9 FL
MONOCYTES # BLD AUTO: 0.74 K/UL
MONOCYTES NFR BLD AUTO: 10.1 %
NEUTROPHILS # BLD AUTO: 3.29 K/UL
NEUTROPHILS NFR BLD AUTO: 44.8 %
PLATELET # BLD AUTO: 203 K/UL
POTASSIUM SERPL-SCNC: 4.6 MMOL/L
PROT SERPL-MCNC: 7.1 G/DL
RBC # BLD: 4.49 M/UL
RBC # FLD: 15 %
SODIUM SERPL-SCNC: 146 MMOL/L
WBC # FLD AUTO: 7.34 K/UL

## 2022-09-21 NOTE — PHYSICAL EXAM
[General Appearance - Alert] : alert [General Appearance - In No Acute Distress] : in no acute distress [General Appearance - Well Nourished] : well nourished [General Appearance - Well Developed] : well developed [General Appearance - Well-Appearing] : healthy appearing [Sclera] : the sclera and conjunctiva were normal [Respiration, Rhythm And Depth] : normal respiratory rhythm and effort [Exaggerated Use Of Accessory Muscles For Inspiration] : no accessory muscle use [Edema] : there was no peripheral edema [] : no rash [Skin Lesions] : no skin lesions [Oriented To Time, Place, And Person] : oriented to person, place, and time [Impaired Insight] : insight and judgment were intact [Affect] : the affect was normal [FreeTextEntry1] : Enlargement of the bilateral MCP, right more than left with good handgrip strength bilaterally, with MCP ulnar drift decrease in ROM of the right shoulder without pain unchanged from previous.

## 2022-09-21 NOTE — HISTORY OF PRESENT ILLNESS
[FreeTextEntry1] : September 20, 2022\par Patient with worsening back pain\par Last injection for the back pain about two months ago, with minimal benefit\par Not improved with pain medication, takes oxycodone and OxyContin, sleeps with TENS unit as well\par Patient would like to see spine orthopedist for second opinion \par Patient will bring previous records to orthopedist as well\par Shoulder doing well, not bothered her since the surgery\par Sometimes with pain in the hands but not as much as previous, better\par Stomach symptoms better\par Will have repeat colonoscopy  as well\par \par May 16, 2022\par Since last visit, had surgery on the right foot, about two weeks ago, doing well\par Will have follow up Friday to remove the pin \par Feels edema of the foot, but otherwise doing well\par Follow up at [facility], Dr. Abilio Stein\par Will have the other foot completed next week\par Chronic right shoulder pain, Arthroscopic surgery 9/21/2021, has some decrease in ROM but without pain\par No new medicines, had new medication for pain after surgery, but not taking at this time\par Using more gabapentin for the foot pain\par Had blood tests before surgery, reports normal\par Continues daily vitamin D after completing high dose weekly\par Intermittent hand pain, continues plaquenil 200 mg bid and  mg bid\par \par January 26, 2022\par Patient returns for follow up\par Feeling increase in pain recently\par right shoulder and back\par Feels stiffness in the hands in the morning as well\par Took prednisone x 2 doses this week with decrease in pain from 10/10 to 4/10\par will have injections for the right shoulder in March 2022\par Discussed vectra DA as well\par Blood tests with PMD 1/14/2022 vitamin d 8, started on vitamin D 90286 per week\par GI symptoms much improved\par \par October 20, 2021\par Had blood tests completed, annual exam last week with PMD\par Had flu vaccine with PMD\par Scheduled for October 25 for Covid booster\par Had right shoulder early September 2021 at [facility]\par No PT at present following surgery as felt the PT made the pan worse, continues home exercises as tolerated\par No changes in medications since seeing medical doctor except added vitamin d \par Plaquenil 200 mg bid\par Sees pain management, had an epidural which helped a little bit, doesn't last too long\par Walking for exercise, about 20 blocks per day, on good, otherwise about 8-10\par Mammogram scheduled\par \par July 21, 2021\par Had shoulder injection on the right, limited benefit\par Feels pain with ROM and with sleeping on the right side, feels worse at night\par Now taking short course of prednisone with unclear benefit\par Was seen seen in [address] [place] [address] by orthopedist on July 16th for second opinion\par Will start PT and if not improved, will consider surgery\par Had lap band replaced about two weeks ago via laparoscopic procedure at [facility]\par Feeling much better in terms of GI symptoms, able to tolerate food at this time\par \par May 20, 2021\par Pain in the right shoulder, for the past four months\par Feels hard to sleep\par Painful, seeing pain management, lowered dosage of pain medications which feels may also have made the pain worse.\par Feels pain in the spine and the shoulder \par Requesting MRI for further evaluation of the right shoulder.\par Had colonoscopy, three polyps, follow up in 6 months to 1 year\par \par Following closely with GI, worsening GI symptoms lately, delayed gastric emptying of both liquids and solids\par Worsening GERD\par Feels unable to sleep \par Unable to digest pills\par Right hand dominant\par Had shoulder xray 6-7 months ago, was in store when dropped something on the floor, bent down to pick something up and a display fell on the right shoulders, went to urgent care, no fractures at that time\par Was also told something abnormal on EKG and needs to follow with her cardiologist as well. \par \par February 23, 2021\par Seen by new pain management doctor\par Has MRI scheduled for the cervical and lumbar spine\par Had reaction to steroid with last epidural, developed high blood pressure and headaches\par Mostly the neck and left upper extremity\par Patient is not interested in surgery at this time\par Joint pain controlled at this time\par \par December 15, 2020\par Patient returns for follow up. \par Patient feeling well, no joint pains today.  Major concern related to chronic back pain, follows with pain management\par Patient had surgery for hernia repair and tolerated well.\par Patient planning to relocate to [place] as well\par No recent changes in medications\par \par September 9, 2020\par Discussed with patient.  You have chosen to receive care through the use of tele-media.  Telemedia enables health care providers at different locations to provide safe, effective, and convenient care through the use of technology.  Please note this is a billable encounter.  Patient understands that I cannot perform a physical examine and that patient may need to come to the clinic to complete the assessment.  Patient agreed verbally to to understanding the risks of benefits of telemedia as explained.\par \par 60 year old woman with history of RA, seen as follow Up reestablish care.  Patient was previously seen more than six months ago.  Since that time, has been following with pain management.  She continue Plaquenil and sulfasalazine but not regularly.  Had some difficulty with refill of Plaquenil due to use for coronavirus. \par \par Pain in the hands, shoulder pain\par Had accident about 1-2 months,  when something fell on her shoulder. was seen in urgent care, had xray, no fracture noted.  Still with pain in the right shoulder, feels pain with abduction of the right shoulder.  Sometimes hard to sleep, sometimes feels some paresthesias in the hands.  \par Has follow up with pain management Tuesday, will discuss the shoulder pain with doctor.  \par Started taking prednisone every other day which has helped\par Feels stiffness and pain in the hands and shoulders\par Patient waiting for approval for spinal stimulator for pain control. \par \par In past, patient was treated with rituxan and decadron (reaction to iv solumedrol), last dose more than one year ago.\par \par September 9, 2020\par Patient returns for follow up\par Patient feeling well n terms of joint pain and stiffness, however main concern related to low back pain.\par Patient was staying with her daughter's family in [place], was lifting her granddaughter and felt pain in the abdominal and back.  Was seen in emergency room, and needs hernia repair surgery, scheduled next week at [facility].  Had all imaging completed in [place], she jc froward results for review.  She will also have pre op clearance next week with her medical doctor.  Labs competed in [place], not available to me at this time. \par Continues Plaquenil and sulfasalazine, no side effects noted. \par Patient requesting refill for Chantix until able to see PMD next week.\par Patient may be relocating to [place] and requests records to be sent to rheumatology practice there.

## 2022-09-21 NOTE — ASSESSMENT
[FreeTextEntry1] : 62 year old woman with history of seronegative RA, ostearthritis, chronic back pain, returns for follow up.  Patient's main concern today related to chronic back pain, requesting second opinion from orthopedics, will place referral for spine evaluation.  Patient with right shoulder surgery at [facility] in early September 2021, improving, still with some reduction in range of motion however pain much improved.  Patient with previous vectra of 16. Will continue current regimen of plaquenil 200 mg bid and  mg bid.  Continues vitamin D daily supplement as completed weekly supplementation.  Follow up with pain management.  We will repeat labs today in office, CBC, CMP, ESR, and CRP.  Patient will follow up in three months or sooner as needed if symptoms change or worsen.\par

## 2022-12-19 ENCOUNTER — APPOINTMENT (OUTPATIENT)
Dept: RHEUMATOLOGY | Facility: CLINIC | Age: 62
End: 2022-12-19

## 2022-12-19 NOTE — DATA REVIEWED
Pt rubbed his eye about an hour ago and thought that maybe he had an eyelash in his eye. Left eye is red and hurts to open.
[FreeTextEntry1] : Labs from PMD visit:\par 10/15/2021\par Vitamin D 15.6\par CMP AST 25, ALT 40\par CBC: normal\par TSH 2.02\par \par 1/14/2022\par Vitamin 8.6\par cmp normal except alt 40\par ast 22\par \par 1/2022 \par Vectra 16

## 2022-12-19 NOTE — ASSESSMENT
[FreeTextEntry1] : 62 year old woman with history of seronegative RA, ostearthritis, chronic back pain, returns for follow up.  Patient's main concern today related to chronic back pain, requesting second opinion from orthopedics, will place referral for spine evaluation.  Patient with right shoulder surgery at [facility] in early September 2021, improving, still with some reduction in range of motion however pain much improved.  Patient with previous vectra of 16. Will continue current regimen of plaquenil 200 mg bid and  mg bid.  Continues vitamin D daily supplement as completed weekly supplementation.  Follow up with pain management.  We will repeat labs today in office, CBC, CMP, ESR, and CRP.  Patient will follow up in three months or sooner as needed if symptoms change or worsen.\par

## 2022-12-19 NOTE — HISTORY OF PRESENT ILLNESS
[FreeTextEntry1] : September 20, 2022\par Patient with worsening back pain\par Last injection for the back pain about two months ago, with minimal benefit\par Not improved with pain medication, takes oxycodone and OxyContin, sleeps with TENS unit as well\par Patient would like to see spine orthopedist for second opinion \par Patient will bring previous records to orthopedist as well\par Shoulder doing well, not bothered her since the surgery\par Sometimes with pain in the hands but not as much as previous, better\par Stomach symptoms better\par Will have repeat colonoscopy  as well\par \par May 16, 2022\par Since last visit, had surgery on the right foot, about two weeks ago, doing well\par Will have follow up Friday to remove the pin \par Feels edema of the foot, but otherwise doing well\par Follow up at [facility], Dr. Abilio Stein\par Will have the other foot completed next week\par Chronic right shoulder pain, Arthroscopic surgery 9/21/2021, has some decrease in ROM but without pain\par No new medicines, had new medication for pain after surgery, but not taking at this time\par Using more gabapentin for the foot pain\par Had blood tests before surgery, reports normal\par Continues daily vitamin D after completing high dose weekly\par Intermittent hand pain, continues plaquenil 200 mg bid and  mg bid\par \par January 26, 2022\par Patient returns for follow up\par Feeling increase in pain recently\par right shoulder and back\par Feels stiffness in the hands in the morning as well\par Took prednisone x 2 doses this week with decrease in pain from 10/10 to 4/10\par will have injections for the right shoulder in March 2022\par Discussed vectra DA as well\par Blood tests with PMD 1/14/2022 vitamin d 8, started on vitamin D 80058 per week\par GI symptoms much improved\par \par October 20, 2021\par Had blood tests completed, annual exam last week with PMD\par Had flu vaccine with PMD\par Scheduled for October 25 for Covid booster\par Had right shoulder early September 2021 at [facility]\par No PT at present following surgery as felt the PT made the pan worse, continues home exercises as tolerated\par No changes in medications since seeing medical doctor except added vitamin d \par Plaquenil 200 mg bid\par Sees pain management, had an epidural which helped a little bit, doesn't last too long\par Walking for exercise, about 20 blocks per day, on good, otherwise about 8-10\par Mammogram scheduled\par \par July 21, 2021\par Had shoulder injection on the right, limited benefit\par Feels pain with ROM and with sleeping on the right side, feels worse at night\par Now taking short course of prednisone with unclear benefit\par Was seen seen in [address] [place] [address] by orthopedist on July 16th for second opinion\par Will start PT and if not improved, will consider surgery\par Had lap band replaced about two weeks ago via laparoscopic procedure at [facility]\par Feeling much better in terms of GI symptoms, able to tolerate food at this time\par \par May 20, 2021\par Pain in the right shoulder, for the past four months\par Feels hard to sleep\par Painful, seeing pain management, lowered dosage of pain medications which feels may also have made the pain worse.\par Feels pain in the spine and the shoulder \par Requesting MRI for further evaluation of the right shoulder.\par Had colonoscopy, three polyps, follow up in 6 months to 1 year\par \par Following closely with GI, worsening GI symptoms lately, delayed gastric emptying of both liquids and solids\par Worsening GERD\par Feels unable to sleep \par Unable to digest pills\par Right hand dominant\par Had shoulder xray 6-7 months ago, was in store when dropped something on the floor, bent down to pick something up and a display fell on the right shoulders, went to urgent care, no fractures at that time\par Was also told something abnormal on EKG and needs to follow with her cardiologist as well. \par \par February 23, 2021\par Seen by new pain management doctor\par Has MRI scheduled for the cervical and lumbar spine\par Had reaction to steroid with last epidural, developed high blood pressure and headaches\par Mostly the neck and left upper extremity\par Patient is not interested in surgery at this time\par Joint pain controlled at this time\par \par December 15, 2020\par Patient returns for follow up. \par Patient feeling well, no joint pains today.  Major concern related to chronic back pain, follows with pain management\par Patient had surgery for hernia repair and tolerated well.\par Patient planning to relocate to [place] as well\par No recent changes in medications\par \par September 9, 2020\par Discussed with patient.  You have chosen to receive care through the use of tele-media.  Telemedia enables health care providers at different locations to provide safe, effective, and convenient care through the use of technology.  Please note this is a billable encounter.  Patient understands that I cannot perform a physical examine and that patient may need to come to the clinic to complete the assessment.  Patient agreed verbally to to understanding the risks of benefits of telemedia as explained.\par \par 60 year old woman with history of RA, seen as follow Up reestablish care.  Patient was previously seen more than six months ago.  Since that time, has been following with pain management.  She continue Plaquenil and sulfasalazine but not regularly.  Had some difficulty with refill of Plaquenil due to use for coronavirus. \par \par Pain in the hands, shoulder pain\par Had accident about 1-2 months,  when something fell on her shoulder. was seen in urgent care, had xray, no fracture noted.  Still with pain in the right shoulder, feels pain with abduction of the right shoulder.  Sometimes hard to sleep, sometimes feels some paresthesias in the hands.  \par Has follow up with pain management Tuesday, will discuss the shoulder pain with doctor.  \par Started taking prednisone every other day which has helped\par Feels stiffness and pain in the hands and shoulders\par Patient waiting for approval for spinal stimulator for pain control. \par \par In past, patient was treated with rituxan and decadron (reaction to iv solumedrol), last dose more than one year ago.\par \par September 9, 2020\par Patient returns for follow up\par Patient feeling well n terms of joint pain and stiffness, however main concern related to low back pain.\par Patient was staying with her daughter's family in [place], was lifting her granddaughter and felt pain in the abdominal and back.  Was seen in emergency room, and needs hernia repair surgery, scheduled next week at [facility].  Had all imaging completed in [place], she jc froward results for review.  She will also have pre op clearance next week with her medical doctor.  Labs competed in [place], not available to me at this time. \par Continues Plaquenil and sulfasalazine, no side effects noted. \par Patient requesting refill for Chantix until able to see PMD next week.\par Patient may be relocating to [place] and requests records to be sent to rheumatology practice there.

## 2023-02-01 ENCOUNTER — APPOINTMENT (OUTPATIENT)
Dept: ORTHOPEDIC SURGERY | Facility: CLINIC | Age: 63
End: 2023-02-01
Payer: MEDICARE

## 2023-02-01 ENCOUNTER — NON-APPOINTMENT (OUTPATIENT)
Age: 63
End: 2023-02-01

## 2023-02-01 VITALS — HEIGHT: 60 IN | WEIGHT: 167 LBS | BODY MASS INDEX: 32.79 KG/M2

## 2023-02-01 PROCEDURE — 72050 X-RAY EXAM NECK SPINE 4/5VWS: CPT

## 2023-02-01 PROCEDURE — 72110 X-RAY EXAM L-2 SPINE 4/>VWS: CPT

## 2023-02-01 PROCEDURE — 99204 OFFICE O/P NEW MOD 45 MIN: CPT

## 2023-02-01 NOTE — HISTORY OF PRESENT ILLNESS
[de-identified] : 62 year old female presents with acute exacerbation of chronic neck and back pain.  The neck pain is severe and radiates to her upper extremities. She has numbness in her upper extremities. She feels weaker in the hands. She notes dropping items and difficulty with fine motor skills.  She has a history of two previous lumbar spine surgeries the most recent which was done by Dr. Park at [facility]. Since the surgeries her low back pain has worsened.  She denies recent illness, fevers, balance problems, saddle anesthesia, urinary retention or fecal incontinence. She is followed by Dr. Jeannie Bustos and Dr. David Molina from pain management.  She takes OxyContin 30 mg bid and Oxycodone 30 mg bid.

## 2023-02-01 NOTE — ASSESSMENT
[FreeTextEntry1] : 62 year old female presents with acute exacerbation of chronic neck and back pain.  The neck pain is severe and radiates to her upper extremities. She has numbness in her upper extremities. She feels weaker in the hands. She notes dropping items and difficulty with fine motor skills.  She has a history of two previous lumbar spine surgeries the most recent which was done by Dr. Park at [facility]. Since the surgeries her low back pain has worsened.  She is otherwise neurologically intact. We reviewed her imaging including her cervical and lumbar MRIs.  She has cervical cord compression with cord signal change at C5-C6 and instability at C4-C5. We discussed treatment options including surgery. Surgery would be ACDF C4-C6.  We discussed the risks and benefits of surgery. RIsks include anesthesia, blood loss, need for blood transfusion, clots, stroke, myocardial infarct, chronic pain, loss of function, need for reoperation, nonunion, hardware failure, dysphagia, voice changes, adjacent segment disease, damage to neurovascular structures and covid-19. She understands surgery will not resolve her neck pain but should help with her upper extremity pain. The surgery will stop the progression of her symptoms.  She understands the natural history of spinal cord compression and cervical myelopathy. She asked several great questions all of which were answered. She will be scheduled for surgery. She will require medical clearance and a covid-19 test prior. In regards to her lumbar spine I do not recommend revision surgery.  We discussed red flag symptoms that would require emergent evaluation. She knows to call with any questions or concerns or if her symptoms acutely worsen.

## 2023-02-01 NOTE — PHYSICAL EXAM
[de-identified] : General: No acute distress, conversant, well-nourished.\par Head: Normocephalic, atraumatic\par Neck: trachea midline, FROM\par Heart: normotensive and normal rate and rhythm\par Lungs: No labored breathing\par Skin: No abrasions, no rashes, no edema\par Psych: Alert and oriented to person, place and time\par Extremities: no peripheral edema or digital cyanosis\par Gait: Normal gait. Can perform tandem gait.  \par Vascular: warm and well perfused distally, palpable distal pulses\par \par MSK:\par Spine: \par No tenderness to palpation.  No step-off, no deformity.\par \par NEURO:\par Sensation \par          Left           \par C5     1/2               \par C6     1/2               \par C7     1/2               \par C8     1/2              \par T1     1/2             \par \par          Right         \par C5     1/2               \par C6     1/2               \par C7     1/2               \par C8     1/2              \par T1     1/2      \par \par Motor: \par                                                Left             \par C5 (deltoid abduction)             5/5               \par C6 (biceps flexion)                   5/5                \par C7 (triceps extension)             5/5               \par C8 (finger flexion)                     4/5               \par T1 (interosseous)                     3/5           \par \par                                                Right           \par C5 (deltoid abduction)             5/5               \par C6 (biceps flexion)                   5/5                \par C7 (triceps extension)             5/5               \par C8 (finger flexion)                     4/5               \par T1 (interosseous)                     3/5                     \par \par Sensation \par Left L2  -  2/2            \par Left L3  -  2/2\par Left L4  -  2/2\par Left L5  -  2/2\par Left S1  -  2/2\par \par Right L2  -  2/2            \par Right L3  -  2/2\par Right L4  -  2/2\par Right L5  -  2/2\par Right S1  -  2/2\par \par Motor: \par Left L2 (hip flexion)                            5/5                \par Left L3 (knee extension)                   5/5                \par Left L4 (ankle dorsiflexion)                 5/5                \par Left L5 (long toe extensor)                5/5                \par Left S1 (ankle plantar flexion)           5/5\par \par Right L2 (hip flexion)                            5/5                \par Right L3 (knee extension)                   5/5                \par Right L4 (ankle dorsiflexion)                 5/5                \par Right L5 (long toe extensor)                5/5                \par Right S1 (ankle plantar flexion)           5/5\par \par Reflexes:Increased and symmetric\par Positive Spurling’s test.  Positive Landaverde’s reflex.  \par Negative clonus.  Down-going Babinski. [de-identified] : I ordered radiographs to evaluate the patient's symptoms.\par \par Cervical 4 view radiographs taken in the office today show no dislocation or fracture. Cervical spondylosis.  C4-C5 anterolisthesis with movement on dynamic series\par \par Lumbar 4 view radiographs taken in the office today show no dislocation or fracture. s/p L3-S1 posterior and interbody lumbar fusion with hardware in appropriate position and alignment. Lumbar spondylosis.  No instability on dynamic series.\par \par Cervical MRI (1/26/23): Mild progression of C4-C5 severe right facet osteoarthritic synovitis and right foramen stenosis. Otherwise, stable multilevel cervical spondylosis most pronounced at C4-C6 including grade 1 listhesis, C5-C6 moderate central broad-based disc osteophyte complex resulting in moderate to severe spinal canal, foramina stenoses and mild flattening of the ventral spinal cord without abnormal intramedullary signal. \par \par Lumbar MRI (1/26/23): No acute pathology or abnormal enhancement of the lumbar spine.\par \par Stable posterior and interbody fusion at L3-S1, without spinal canal or foraminal stenosis.\par \par Stable T11-L3 moderate intervertebral osteochondrosis, small disc bulges osteophyte complexes, and L2-L3 moderate facet arthrosis, ligamenta flava hypertrophy resulting in moderate spinal canal, right foramen stenoses and impingement of exiting right L2 nerve root.

## 2023-02-08 ENCOUNTER — APPOINTMENT (OUTPATIENT)
Dept: RHEUMATOLOGY | Facility: CLINIC | Age: 63
End: 2023-02-08
Payer: MEDICARE

## 2023-02-08 VITALS
BODY MASS INDEX: 32 KG/M2 | HEART RATE: 75 BPM | HEIGHT: 60 IN | SYSTOLIC BLOOD PRESSURE: 153 MMHG | OXYGEN SATURATION: 94 % | TEMPERATURE: 97.4 F | DIASTOLIC BLOOD PRESSURE: 89 MMHG | WEIGHT: 163 LBS

## 2023-02-08 PROCEDURE — 99213 OFFICE O/P EST LOW 20 MIN: CPT

## 2023-02-09 NOTE — REVIEW OF SYSTEMS
[Feeling Poorly] : feeling poorly [Negative] : Heme/Lymph [FreeTextEntry2] : neck pain [FreeTextEntry9] : neck and back pain, shoulder pain

## 2023-02-09 NOTE — ASSESSMENT
[FreeTextEntry1] : 62 year old woman with history of seronegative RA, ostearthritis, chronic neck and  back pain, returns for follow up.  Patient's main concern today related to neck pain, patient is scheduled for cervical fusion at the end of the month with Dr. Elias.  Patient will have preop evaluation by primary care doctor next week and will forward results of blood testing to office for my review.  Patient with right shoulder surgery at [facility] in early September 2021, improving, still with some reduction in range of motion however pain much improved.  History of inflammatory arthritis, patient with previous vectra of 16. Will continue current regimen of plaquenil 200 mg bid and  mg bid.  Continues vitamin D daily supplement as completed weekly supplementation.  Follow up with pain management.  Patient will follow-up after surgery in about 3 months or sooner as needed

## 2023-02-09 NOTE — HISTORY OF PRESENT ILLNESS
[FreeTextEntry1] : February 8, 2023\par Patient returns for follow-up\par Since last visit, patient concerned about bruising on the right abdominal wall and right upper thigh\par Patient does not recall injury, but was taking aspirin with NSAIDs, as increased NSAID use due to neck pain\par Having issues with neck, patient was seen by spine surgery, scheduled for cervical spine surgery\par Was seen by ortho surgeon, will have ACDF fusion of C4-C7 in February \par Having the tingling in the hands likely related to the cervical spine\par Stomach is ok\par Has been feeling more shoulder pain as well but feels likely related to neck and muscle spasm\par Patient will have preop testing with primary care doctor next week, will forward lab results to office at that time\par Continues current medications\par \par September 20, 2022\par Patient with worsening back pain\par Last injection for the back pain about two months ago, with minimal benefit\par Not improved with pain medication, takes oxycodone and OxyContin, sleeps with TENS unit as well\par Patient would like to see spine orthopedist for second opinion \par Patient will bring previous records to orthopedist as well\par Shoulder doing well, not bothered her since the surgery\par Sometimes with pain in the hands but not as much as previous, better\par Stomach symptoms better\par Will have repeat colonoscopy  as well\par \par May 16, 2022\par Since last visit, had surgery on the right foot, about two weeks ago, doing well\par Will have follow up Friday to remove the pin \par Feels edema of the foot, but otherwise doing well\par Follow up at [place], Dr. Abilio Stein\par Will have the other foot completed next week\par Chronic right shoulder pain, Arthroscopic surgery 9/21/2021, has some decrease in ROM but without pain\par No new medicines, had new medication for pain after surgery, but not taking at this time\par Using more gabapentin for the foot pain\par Had blood tests before surgery, reports normal\par Continues daily vitamin D after completing high dose weekly\par Intermittent hand pain, continues plaquenil 200 mg bid and  mg bid\par \par January 26, 2022\par Patient returns for follow up\par Feeling increase in pain recently\par right shoulder and back\par Feels stiffness in the hands in the morning as well\par Took prednisone x 2 doses this week with decrease in pain from 10/10 to 4/10\par will have injections for the right shoulder in March 2022\par Discussed vectra DA as well\par Blood tests with PMD 1/14/2022 vitamin d 8, started on vitamin D 15922 per week\par GI symptoms much improved\par \par October 20, 2021\par Had blood tests completed, annual exam last week with PMD\par Had flu vaccine with PMD\par Scheduled for October 25 for Covid booster\par Had right shoulder early September 2021 at [facility]\par No PT at present following surgery as felt the PT made the pan worse, continues home exercises as tolerated\par No changes in medications since seeing medical doctor except added vitamin d \par Plaquenil 200 mg bid\par Sees pain management, had an epidural which helped a little bit, doesn't last too long\par Walking for exercise, about 20 blocks per day, on good, otherwise about 8-10\par Mammogram scheduled\par \par July 21, 2021\par Had shoulder injection on the right, limited benefit\par Feels pain with ROM and with sleeping on the right side, feels worse at night\par Now taking short course of prednisone with unclear benefit\par Was seen seen in [address] [place] [address] by orthopedist on July 16th for second opinion\par Will start PT and if not improved, will consider surgery\par Had lap band replaced about two weeks ago via laparoscopic procedure at [facility]\par Feeling much better in terms of GI symptoms, able to tolerate food at this time\par \par May 20, 2021\par Pain in the right shoulder, for the past four months\par Feels hard to sleep\par Painful, seeing pain management, lowered dosage of pain medications which feels may also have made the pain worse.\par Feels pain in the spine and the shoulder \par Requesting MRI for further evaluation of the right shoulder.\par Had colonoscopy, three polyps, follow up in 6 months to 1 year\par \par Following closely with GI, worsening GI symptoms lately, delayed gastric emptying of both liquids and solids\par Worsening GERD\par Feels unable to sleep \par Unable to digest pills\par Right hand dominant\par Had shoulder xray 6-7 months ago, was in store when dropped something on the floor, bent down to pick something up and a display fell on the right shoulders, went to urgent care, no fractures at that time\par Was also told something abnormal on EKG and needs to follow with her cardiologist as well. \par \par February 23, 2021\par Seen by new pain management doctor\par Has MRI scheduled for the cervical and lumbar spine\par Had reaction to steroid with last epidural, developed high blood pressure and headaches\par Mostly the neck and left upper extremity\par Patient is not interested in surgery at this time\par Joint pain controlled at this time\par \par December 15, 2020\par Patient returns for follow up. \par Patient feeling well, no joint pains today.  Major concern related to chronic back pain, follows with pain management\par Patient had surgery for hernia repair and tolerated well.\par Patient planning to relocate to [place] as well\par No recent changes in medications\par \par September 9, 2020\par Discussed with patient.  You have chosen to receive care through the use of tele-media.  Telemedia enables health care providers at different locations to provide safe, effective, and convenient care through the use of technology.  Please note this is a billable encounter.  Patient understands that I cannot perform a physical examine and that patient may need to come to the clinic to complete the assessment.  Patient agreed verbally to to understanding the risks of benefits of telemedia as explained.\par \par 60 year old woman with history of RA, seen as follow Up reestablish care.  Patient was previously seen more than six months ago.  Since that time, has been following with pain management.  She continue Plaquenil and sulfasalazine but not regularly.  Had some difficulty with refill of Plaquenil due to use for coronavirus. \par \par Pain in the hands, shoulder pain\par Had accident about 1-2 months,  when something fell on her shoulder. was seen in urgent care, had xray, no fracture noted.  Still with pain in the right shoulder, feels pain with abduction of the right shoulder.  Sometimes hard to sleep, sometimes feels some paresthesias in the hands.  \par Has follow up with pain management Tuesday, will discuss the shoulder pain with doctor.  \par Started taking prednisone every other day which has helped\par Feels stiffness and pain in the hands and shoulders\par Patient waiting for approval for spinal stimulator for pain control. \par \par In past, patient was treated with rituxan and decadron (reaction to iv solumedrol), last dose more than one year ago.\par \par September 9, 2020\par Patient returns for follow up\par Patient feeling well n terms of joint pain and stiffness, however main concern related to low back pain.\par Patient was staying with her daughter's family in [place], was lifting her granddaughter and felt pain in the abdominal and back.  Was seen in emergency room, and needs hernia repair surgery, scheduled next week at [facility].  Had all imaging completed in [place], she jc froward results for review.  She will also have pre op clearance next week with her medical doctor.  Labs competed in [place], not available to me at this time. \par Continues Plaquenil and sulfasalazine, no side effects noted. \par Patient requesting refill for Chantix until able to see PMD next week.\par Patient may be relocating to [place] and requests records to be sent to rheumatology practice there.

## 2023-02-09 NOTE — PHYSICAL EXAM
[General Appearance - Alert] : alert [General Appearance - In No Acute Distress] : in no acute distress [General Appearance - Well-Appearing] : healthy appearing [] : no respiratory distress [Respiration, Rhythm And Depth] : normal respiratory rhythm and effort [Exaggerated Use Of Accessory Muscles For Inspiration] : no accessory muscle use [Abnormal Walk] : normal gait [Oriented To Time, Place, And Person] : oriented to person, place, and time [Impaired Insight] : insight and judgment were intact [Affect] : the affect was normal [FreeTextEntry1] : Decreased ROm of the bilateral shoulders. Decreased ROM of the cervical spine, decreased hand  bilaterally. Never

## 2023-03-24 VITALS
WEIGHT: 162.26 LBS | HEART RATE: 68 BPM | HEIGHT: 60 IN | OXYGEN SATURATION: 97 % | TEMPERATURE: 98 F | DIASTOLIC BLOOD PRESSURE: 86 MMHG | RESPIRATION RATE: 16 BRPM | SYSTOLIC BLOOD PRESSURE: 128 MMHG

## 2023-03-24 RX ORDER — POVIDONE-IODINE 5 %
1 AEROSOL (ML) TOPICAL ONCE
Refills: 0 | Status: COMPLETED | OUTPATIENT
Start: 2023-03-27 | End: 2023-03-27

## 2023-03-24 NOTE — H&P ADULT - NSICDXPASTSURGICALHX_GEN_ALL_CORE_FT
PAST SURGICAL HISTORY:  Gall stones     H/O carpal tunnel repair B/L hand    H/O  section x2    H/O hernia repair     H/O right knee surgery     H/O tubal ligation     History of appendectomy     History of arthroscopy of right shoulder     History of bunionectomy and reconstruction    History of cholecystectomy     History of lumbosacral spine surgery x2    History of tonsillectomy

## 2023-03-24 NOTE — H&P ADULT - HISTORY OF PRESENT ILLNESS
63F with neck pain x    Presents for elective Anterior cervical discectomy and fusion, C4-C6. 63F with neck pain x about 2 years. Reports worsening pain in the past few months. Pain persists despite conservative measures of physical therapy, steroid injections and pain management care. Ambulates with a cane and walker. Patient endorses the feeling of tingling in her bilateral finger tip intermittently and feeling weaker in her arms. Manages pain with a regime from her pain management provider. Denies numbness to bilateral arms.     Denies history of blood clots and seizures. Last took pain medication and ASA 81mg this morning as instructed to by Curt Paul. Denies chest pain, SOB, nausea and vomiting today.    Presents for elective Anterior cervical discectomy and fusion, C4-C6.

## 2023-03-24 NOTE — PRE-OP CHECKLIST - COMMENTS
Sip of water this morning with medication (Oxycodone 30mg) @ Sip of water this morning with medication (Oxycodone 30mg) @ 0500

## 2023-03-24 NOTE — H&P ADULT - NSICDXPASTMEDICALHX_GEN_ALL_CORE_FT
PAST MEDICAL HISTORY:  HLD (hyperlipidemia)     HTN (hypertension)     Obstructive sleep apnea     Systemic lupus      PAST MEDICAL HISTORY:  Arthritis     HLD (hyperlipidemia)     HTN (hypertension)     Obstructive sleep apnea     Systemic lupus

## 2023-03-24 NOTE — H&P ADULT - NSHPLABSRESULTS_GEN_ALL_CORE
Preop CBC, BMP, PT/INR, PTT within normal range and reviewed per medical clearance  Cr- 1.03  Preop EKG within normal limits and reviewed per medical clearance; NSR @ 68bpm  TTE: LVEF 60%  3M: DOS  T + S: DOS  COVID: neg 3/23

## 2023-03-24 NOTE — H&P ADULT - NSHPPHYSICALEXAM_GEN_ALL_CORE
Gen: 63F NAD  MSK: Decreased cervical ROM secondary to pain      Rest of PE per MD clearance Gen: 63F NAD, resting comfortably in chair  MSK: Decreased cervical ROM secondary to pain  Skin without erythema, ecchymosis, abrasions or lesions, skin is wwp   Sensation intact to light touch bilateral upper extremities  Pulses: +2 RP palpable, brisk capillary refill  AIN/PIN/ulnar/median/radial intact 5/5 bilaterally    Rest of PE per MD clearance

## 2023-03-24 NOTE — PATIENT PROFILE ADULT - FALL HARM RISK - UNIVERSAL INTERVENTIONS
Bed in lowest position, wheels locked, appropriate side rails in place/Call bell, personal items and telephone in reach/Instruct patient to call for assistance before getting out of bed or chair/Non-slip footwear when patient is out of bed/Debary to call system/Physically safe environment - no spills, clutter or unnecessary equipment/Purposeful Proactive Rounding/Room/bathroom lighting operational, light cord in reach

## 2023-03-24 NOTE — H&P ADULT - PROBLEM SELECTOR PLAN 1
Admit to Orthopedic Service for elective ACDF C4-C6    Medically cleared and optimized for surgery by Dr. Quintana

## 2023-03-24 NOTE — PATIENT PROFILE ADULT - MONEY FOR FOOD
edwina pt    Refill Request for Medication: dicyclomine     Dose: 10 mg    Quantity: asking for extra refills on the script     Pharmacy: [facility] in [place]
no

## 2023-03-24 NOTE — H&P ADULT - NSHPSOCIALHISTORY_GEN_ALL_CORE
former smoker, quit 2 years ago, smoking 1/2 pack/day x 30 years, denies recreational drug use, and endorse social etoh

## 2023-03-25 NOTE — PRE-ANESTHESIA EVALUATION ADULT - NSANTHLABRESULTSFT_GEN_ALL_CORE
Preop CBC, BMP, PT/INR, PTT within normal range.  Cr- 1.03  Preop EKG within normal limits and reviewed per medical clearance; NSR @ 68bpm      COVID: neg 3/23
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
## 2023-03-25 NOTE — PRE-ANESTHESIA EVALUATION ADULT - NSANTHPEFT_GEN_ALL_CORE
adequate iv access Static: mental illness  Modifiable: mood episode, access to med/treatment  Protective: future oriented, , job, apartment, insight/judgment

## 2023-03-26 ENCOUNTER — TRANSCRIPTION ENCOUNTER (OUTPATIENT)
Age: 63
End: 2023-03-26

## 2023-03-27 ENCOUNTER — INPATIENT (INPATIENT)
Facility: HOSPITAL | Age: 63
LOS: 0 days | Discharge: HOME HEALTH | DRG: 473 | End: 2023-03-28
Attending: STUDENT IN AN ORGANIZED HEALTH CARE EDUCATION/TRAINING PROGRAM | Admitting: STUDENT IN AN ORGANIZED HEALTH CARE EDUCATION/TRAINING PROGRAM
Payer: MEDICARE

## 2023-03-27 ENCOUNTER — APPOINTMENT (OUTPATIENT)
Dept: ORTHOPEDIC SURGERY | Facility: HOSPITAL | Age: 63
End: 2023-03-27

## 2023-03-27 ENCOUNTER — TRANSCRIPTION ENCOUNTER (OUTPATIENT)
Age: 63
End: 2023-03-27

## 2023-03-27 DIAGNOSIS — Z98.890 OTHER SPECIFIED POSTPROCEDURAL STATES: Chronic | ICD-10-CM

## 2023-03-27 DIAGNOSIS — G47.33 OBSTRUCTIVE SLEEP APNEA (ADULT) (PEDIATRIC): ICD-10-CM

## 2023-03-27 DIAGNOSIS — Z90.49 ACQUIRED ABSENCE OF OTHER SPECIFIED PARTS OF DIGESTIVE TRACT: Chronic | ICD-10-CM

## 2023-03-27 DIAGNOSIS — Z90.89 ACQUIRED ABSENCE OF OTHER ORGANS: Chronic | ICD-10-CM

## 2023-03-27 DIAGNOSIS — I10 ESSENTIAL (PRIMARY) HYPERTENSION: ICD-10-CM

## 2023-03-27 DIAGNOSIS — M48.02 SPINAL STENOSIS, CERVICAL REGION: ICD-10-CM

## 2023-03-27 DIAGNOSIS — K80.20 CALCULUS OF GALLBLADDER WITHOUT CHOLECYSTITIS WITHOUT OBSTRUCTION: Chronic | ICD-10-CM

## 2023-03-27 DIAGNOSIS — E78.5 HYPERLIPIDEMIA, UNSPECIFIED: ICD-10-CM

## 2023-03-27 DIAGNOSIS — M32.9 SYSTEMIC LUPUS ERYTHEMATOSUS, UNSPECIFIED: ICD-10-CM

## 2023-03-27 DIAGNOSIS — Z98.51 TUBAL LIGATION STATUS: Chronic | ICD-10-CM

## 2023-03-27 DIAGNOSIS — Z98.891 HISTORY OF UTERINE SCAR FROM PREVIOUS SURGERY: Chronic | ICD-10-CM

## 2023-03-27 LAB
BLD GP AB SCN SERPL QL: NEGATIVE — SIGNIFICANT CHANGE UP
RH IG SCN BLD-IMP: POSITIVE — SIGNIFICANT CHANGE UP

## 2023-03-27 PROCEDURE — 22552 ARTHRD ANT NTRBD CERVICAL EA: CPT

## 2023-03-27 PROCEDURE — 22551 ARTHRD ANT NTRBDY CERVICAL: CPT

## 2023-03-27 PROCEDURE — 22845 INSERT SPINE FIXATION DEVICE: CPT | Mod: 59

## 2023-03-27 PROCEDURE — 22853 INSJ BIOMECHANICAL DEVICE: CPT

## 2023-03-27 DEVICE — IMPLANTABLE DEVICE: Type: IMPLANTABLE DEVICE | Status: FUNCTIONAL

## 2023-03-27 DEVICE — DISTRACTION PIN 12MM (BLUE): Type: IMPLANTABLE DEVICE | Status: FUNCTIONAL

## 2023-03-27 DEVICE — SURGIFLO HEMOSTATIC MATRIX KIT: Type: IMPLANTABLE DEVICE | Status: FUNCTIONAL

## 2023-03-27 RX ORDER — ACETAMINOPHEN 500 MG
1000 TABLET ORAL ONCE
Refills: 0 | Status: COMPLETED | OUTPATIENT
Start: 2023-03-27 | End: 2023-03-27

## 2023-03-27 RX ORDER — CEFAZOLIN SODIUM 1 G
2000 VIAL (EA) INJECTION EVERY 8 HOURS
Refills: 0 | Status: DISCONTINUED | OUTPATIENT
Start: 2023-03-27 | End: 2023-03-27

## 2023-03-27 RX ORDER — POLYETHYLENE GLYCOL 3350 17 G/17G
17 POWDER, FOR SOLUTION ORAL DAILY
Refills: 0 | Status: DISCONTINUED | OUTPATIENT
Start: 2023-03-27 | End: 2023-03-28

## 2023-03-27 RX ORDER — SENNA PLUS 8.6 MG/1
2 TABLET ORAL AT BEDTIME
Refills: 0 | Status: DISCONTINUED | OUTPATIENT
Start: 2023-03-27 | End: 2023-03-28

## 2023-03-27 RX ORDER — HYDROMORPHONE HYDROCHLORIDE 2 MG/ML
0.5 INJECTION INTRAMUSCULAR; INTRAVENOUS; SUBCUTANEOUS
Refills: 0 | Status: DISCONTINUED | OUTPATIENT
Start: 2023-03-27 | End: 2023-03-27

## 2023-03-27 RX ORDER — SODIUM CHLORIDE 9 MG/ML
1000 INJECTION, SOLUTION INTRAVENOUS
Refills: 0 | Status: DISCONTINUED | OUTPATIENT
Start: 2023-03-27 | End: 2023-03-28

## 2023-03-27 RX ORDER — CHLORHEXIDINE GLUCONATE 213 G/1000ML
1 SOLUTION TOPICAL ONCE
Refills: 0 | Status: COMPLETED | OUTPATIENT
Start: 2023-03-27 | End: 2023-03-27

## 2023-03-27 RX ORDER — METHOCARBAMOL 500 MG/1
500 TABLET, FILM COATED ORAL THREE TIMES A DAY
Refills: 0 | Status: DISCONTINUED | OUTPATIENT
Start: 2023-03-27 | End: 2023-03-27

## 2023-03-27 RX ORDER — MAGNESIUM HYDROXIDE 400 MG/1
30 TABLET, CHEWABLE ORAL EVERY 12 HOURS
Refills: 0 | Status: DISCONTINUED | OUTPATIENT
Start: 2023-03-27 | End: 2023-03-28

## 2023-03-27 RX ORDER — ONDANSETRON 8 MG/1
4 TABLET, FILM COATED ORAL EVERY 6 HOURS
Refills: 0 | Status: DISCONTINUED | OUTPATIENT
Start: 2023-03-27 | End: 2023-03-28

## 2023-03-27 RX ORDER — CYCLOBENZAPRINE HYDROCHLORIDE 10 MG/1
5 TABLET, FILM COATED ORAL THREE TIMES A DAY
Refills: 0 | Status: DISCONTINUED | OUTPATIENT
Start: 2023-03-27 | End: 2023-03-28

## 2023-03-27 RX ORDER — OXYCODONE HYDROCHLORIDE 5 MG/1
10 TABLET ORAL ONCE
Refills: 0 | Status: DISCONTINUED | OUTPATIENT
Start: 2023-03-27 | End: 2023-03-28

## 2023-03-27 RX ORDER — OXYCODONE HYDROCHLORIDE 5 MG/1
5 TABLET ORAL EVERY 4 HOURS
Refills: 0 | Status: DISCONTINUED | OUTPATIENT
Start: 2023-03-27 | End: 2023-03-28

## 2023-03-27 RX ORDER — METOCLOPRAMIDE HCL 10 MG
10 TABLET ORAL ONCE
Refills: 0 | Status: DISCONTINUED | OUTPATIENT
Start: 2023-03-27 | End: 2023-03-28

## 2023-03-27 RX ORDER — HYDROMORPHONE HYDROCHLORIDE 2 MG/ML
0.5 INJECTION INTRAMUSCULAR; INTRAVENOUS; SUBCUTANEOUS EVERY 4 HOURS
Refills: 0 | Status: DISCONTINUED | OUTPATIENT
Start: 2023-03-27 | End: 2023-03-28

## 2023-03-27 RX ORDER — ACETAMINOPHEN 500 MG
1000 TABLET ORAL EVERY 8 HOURS
Refills: 0 | Status: DISCONTINUED | OUTPATIENT
Start: 2023-03-27 | End: 2023-03-28

## 2023-03-27 RX ORDER — PANTOPRAZOLE SODIUM 20 MG/1
40 TABLET, DELAYED RELEASE ORAL
Refills: 0 | Status: DISCONTINUED | OUTPATIENT
Start: 2023-03-27 | End: 2023-03-28

## 2023-03-27 RX ORDER — DEXAMETHASONE 0.5 MG/5ML
6 ELIXIR ORAL EVERY 6 HOURS
Refills: 0 | Status: COMPLETED | OUTPATIENT
Start: 2023-03-27 | End: 2023-03-28

## 2023-03-27 RX ORDER — APREPITANT 80 MG/1
40 CAPSULE ORAL ONCE
Refills: 0 | Status: COMPLETED | OUTPATIENT
Start: 2023-03-27 | End: 2023-03-27

## 2023-03-27 RX ORDER — CEFAZOLIN SODIUM 1 G
2000 VIAL (EA) INJECTION EVERY 8 HOURS
Refills: 0 | Status: COMPLETED | OUTPATIENT
Start: 2023-03-27 | End: 2023-03-28

## 2023-03-27 RX ORDER — OXYCODONE HYDROCHLORIDE 5 MG/1
1 TABLET ORAL
Qty: 0 | Refills: 0 | DISCHARGE

## 2023-03-27 RX ADMIN — OXYCODONE HYDROCHLORIDE 5 MILLIGRAM(S): 5 TABLET ORAL at 15:11

## 2023-03-27 RX ADMIN — HYDROMORPHONE HYDROCHLORIDE 0.5 MILLIGRAM(S): 2 INJECTION INTRAMUSCULAR; INTRAVENOUS; SUBCUTANEOUS at 14:32

## 2023-03-27 RX ADMIN — Medication 6 MILLIGRAM(S): at 20:15

## 2023-03-27 RX ADMIN — HYDROMORPHONE HYDROCHLORIDE 0.5 MILLIGRAM(S): 2 INJECTION INTRAMUSCULAR; INTRAVENOUS; SUBCUTANEOUS at 13:45

## 2023-03-27 RX ADMIN — SODIUM CHLORIDE 100 MILLILITER(S): 9 INJECTION, SOLUTION INTRAVENOUS at 13:48

## 2023-03-27 RX ADMIN — HYDROMORPHONE HYDROCHLORIDE 0.5 MILLIGRAM(S): 2 INJECTION INTRAMUSCULAR; INTRAVENOUS; SUBCUTANEOUS at 13:48

## 2023-03-27 RX ADMIN — HYDROMORPHONE HYDROCHLORIDE 0.5 MILLIGRAM(S): 2 INJECTION INTRAMUSCULAR; INTRAVENOUS; SUBCUTANEOUS at 14:03

## 2023-03-27 RX ADMIN — Medication 1000 MILLIGRAM(S): at 07:14

## 2023-03-27 RX ADMIN — APREPITANT 40 MILLIGRAM(S): 80 CAPSULE ORAL at 07:15

## 2023-03-27 RX ADMIN — Medication 1000 MILLIGRAM(S): at 21:33

## 2023-03-27 RX ADMIN — Medication 1000 MILLIGRAM(S): at 13:39

## 2023-03-27 RX ADMIN — HYDROMORPHONE HYDROCHLORIDE 0.5 MILLIGRAM(S): 2 INJECTION INTRAMUSCULAR; INTRAVENOUS; SUBCUTANEOUS at 13:30

## 2023-03-27 RX ADMIN — HYDROMORPHONE HYDROCHLORIDE 0.5 MILLIGRAM(S): 2 INJECTION INTRAMUSCULAR; INTRAVENOUS; SUBCUTANEOUS at 21:33

## 2023-03-27 RX ADMIN — Medication 1 APPLICATION(S): at 07:06

## 2023-03-27 RX ADMIN — HYDROMORPHONE HYDROCHLORIDE 0.5 MILLIGRAM(S): 2 INJECTION INTRAMUSCULAR; INTRAVENOUS; SUBCUTANEOUS at 17:00

## 2023-03-27 RX ADMIN — Medication 50 MILLIGRAM(S): at 15:11

## 2023-03-27 RX ADMIN — Medication 100 MILLIGRAM(S): at 17:55

## 2023-03-27 RX ADMIN — Medication 1000 MILLIGRAM(S): at 14:15

## 2023-03-27 RX ADMIN — OXYCODONE HYDROCHLORIDE 5 MILLIGRAM(S): 5 TABLET ORAL at 15:42

## 2023-03-27 RX ADMIN — POLYETHYLENE GLYCOL 3350 17 GRAM(S): 17 POWDER, FOR SOLUTION ORAL at 21:33

## 2023-03-27 RX ADMIN — CYCLOBENZAPRINE HYDROCHLORIDE 5 MILLIGRAM(S): 10 TABLET, FILM COATED ORAL at 15:11

## 2023-03-27 RX ADMIN — SENNA PLUS 2 TABLET(S): 8.6 TABLET ORAL at 21:33

## 2023-03-27 RX ADMIN — HYDROMORPHONE HYDROCHLORIDE 0.5 MILLIGRAM(S): 2 INJECTION INTRAMUSCULAR; INTRAVENOUS; SUBCUTANEOUS at 22:03

## 2023-03-27 RX ADMIN — Medication 50 MILLIGRAM(S): at 21:33

## 2023-03-27 RX ADMIN — OXYCODONE HYDROCHLORIDE 5 MILLIGRAM(S): 5 TABLET ORAL at 20:15

## 2023-03-27 RX ADMIN — CHLORHEXIDINE GLUCONATE 1 APPLICATION(S): 213 SOLUTION TOPICAL at 07:00

## 2023-03-27 RX ADMIN — HYDROMORPHONE HYDROCHLORIDE 0.5 MILLIGRAM(S): 2 INJECTION INTRAMUSCULAR; INTRAVENOUS; SUBCUTANEOUS at 14:13

## 2023-03-27 RX ADMIN — HYDROMORPHONE HYDROCHLORIDE 0.5 MILLIGRAM(S): 2 INJECTION INTRAMUSCULAR; INTRAVENOUS; SUBCUTANEOUS at 16:45

## 2023-03-27 NOTE — PROGRESS NOTE ADULT - SUBJECTIVE AND OBJECTIVE BOX
Ortho Post Op Check    Procedure: ACDF C2-C4  Surgeon: Dr. Elias    Pt seen and examined at bedside. Pt reporting incisional pain in the neck.   Denies CP, SOB, N/V, numbness/tingling, dizziness, lightheadedness.     Vital Signs Last 24 Hrs  T(C): 36.8 (03-27-23 @ 12:17), Max: 36.8 (03-27-23 @ 12:17)  T(F): 98.2 (03-27-23 @ 12:17), Max: 98.2 (03-27-23 @ 12:17)  HR: 81 (03-27-23 @ 14:02) (78 - 83)  BP: 137/70 (03-27-23 @ 14:02) (117/69 - 149/74)  BP(mean): 97 (03-27-23 @ 14:02) (87 - 105)  RR: 19 (03-27-23 @ 14:02) (17 - 19)  SpO2: 100% (03-27-23 @ 14:02) (100% - 100%)  I&O's Summary    27 Mar 2023 07:01  -  27 Mar 2023 14:52  --------------------------------------------------------  IN: 300 mL / OUT: 0 mL / NET: 300 mL        General: Pt Alert and oriented, NAD  DSG C/D/I- telfa and tegaderm anteriorly neck, soft collar, HV x 1 holding suction  Pulses: 2+ radial pulses bilaterally, brisk cap refill   Sensation: SILT bilateral upper extremities  Motor: AIN/PIN/Ulnar/Median: 5/5 bilaterally, : 5/5 bilaterally        A/P: 63yFemale POD#0 s/p ACDF C2-C4 with Dr. Elias  - Stable  - Pain Control  - Continue with ortega until OOB and ambulating  - Continue to monitor drain output  - DVT ppx: SCDs  - Post op abx: Ancef  - PT, WBS: WBAT, OT consult    Ortho Pager [phone]

## 2023-03-28 ENCOUNTER — TRANSCRIPTION ENCOUNTER (OUTPATIENT)
Age: 63
End: 2023-03-28

## 2023-03-28 VITALS
RESPIRATION RATE: 17 BRPM | TEMPERATURE: 97 F | DIASTOLIC BLOOD PRESSURE: 69 MMHG | SYSTOLIC BLOOD PRESSURE: 140 MMHG | OXYGEN SATURATION: 99 % | HEART RATE: 68 BPM

## 2023-03-28 LAB
ANION GAP SERPL CALC-SCNC: 10 MMOL/L — SIGNIFICANT CHANGE UP (ref 5–17)
BASOPHILS # BLD AUTO: 0.02 K/UL — SIGNIFICANT CHANGE UP (ref 0–0.2)
BASOPHILS NFR BLD AUTO: 0.2 % — SIGNIFICANT CHANGE UP (ref 0–2)
BUN SERPL-MCNC: 13 MG/DL — SIGNIFICANT CHANGE UP (ref 7–23)
CALCIUM SERPL-MCNC: 9.4 MG/DL — SIGNIFICANT CHANGE UP (ref 8.4–10.5)
CHLORIDE SERPL-SCNC: 103 MMOL/L — SIGNIFICANT CHANGE UP (ref 96–108)
CO2 SERPL-SCNC: 26 MMOL/L — SIGNIFICANT CHANGE UP (ref 22–31)
CREAT SERPL-MCNC: 0.68 MG/DL — SIGNIFICANT CHANGE UP (ref 0.5–1.3)
EGFR: 97 ML/MIN/1.73M2 — SIGNIFICANT CHANGE UP
EOSINOPHIL # BLD AUTO: 0 K/UL — SIGNIFICANT CHANGE UP (ref 0–0.5)
EOSINOPHIL NFR BLD AUTO: 0 % — SIGNIFICANT CHANGE UP (ref 0–6)
GLUCOSE SERPL-MCNC: 114 MG/DL — HIGH (ref 70–99)
HCT VFR BLD CALC: 37.7 % — SIGNIFICANT CHANGE UP (ref 34.5–45)
HCV AB S/CO SERPL IA: 0.11 S/CO — SIGNIFICANT CHANGE UP (ref 0–0.99)
HCV AB SERPL-IMP: SIGNIFICANT CHANGE UP
HGB BLD-MCNC: 12.3 G/DL — SIGNIFICANT CHANGE UP (ref 11.5–15.5)
IMM GRANULOCYTES NFR BLD AUTO: 0.5 % — SIGNIFICANT CHANGE UP (ref 0–0.9)
LYMPHOCYTES # BLD AUTO: 1.42 K/UL — SIGNIFICANT CHANGE UP (ref 1–3.3)
LYMPHOCYTES # BLD AUTO: 12.3 % — LOW (ref 13–44)
MCHC RBC-ENTMCNC: 30.1 PG — SIGNIFICANT CHANGE UP (ref 27–34)
MCHC RBC-ENTMCNC: 32.6 GM/DL — SIGNIFICANT CHANGE UP (ref 32–36)
MCV RBC AUTO: 92.2 FL — SIGNIFICANT CHANGE UP (ref 80–100)
MONOCYTES # BLD AUTO: 0.66 K/UL — SIGNIFICANT CHANGE UP (ref 0–0.9)
MONOCYTES NFR BLD AUTO: 5.7 % — SIGNIFICANT CHANGE UP (ref 2–14)
NEUTROPHILS # BLD AUTO: 9.4 K/UL — HIGH (ref 1.8–7.4)
NEUTROPHILS NFR BLD AUTO: 81.3 % — HIGH (ref 43–77)
NRBC # BLD: 0 /100 WBCS — SIGNIFICANT CHANGE UP (ref 0–0)
PLATELET # BLD AUTO: 166 K/UL — SIGNIFICANT CHANGE UP (ref 150–400)
POTASSIUM SERPL-MCNC: 3.1 MMOL/L — LOW (ref 3.5–5.3)
POTASSIUM SERPL-SCNC: 3.1 MMOL/L — LOW (ref 3.5–5.3)
RBC # BLD: 4.09 M/UL — SIGNIFICANT CHANGE UP (ref 3.8–5.2)
RBC # FLD: 13.9 % — SIGNIFICANT CHANGE UP (ref 10.3–14.5)
SODIUM SERPL-SCNC: 139 MMOL/L — SIGNIFICANT CHANGE UP (ref 135–145)
WBC # BLD: 11.56 K/UL — HIGH (ref 3.8–10.5)
WBC # FLD AUTO: 11.56 K/UL — HIGH (ref 3.8–10.5)

## 2023-03-28 PROCEDURE — 80048 BASIC METABOLIC PNL TOTAL CA: CPT

## 2023-03-28 PROCEDURE — 36415 COLL VENOUS BLD VENIPUNCTURE: CPT

## 2023-03-28 PROCEDURE — 86900 BLOOD TYPING SEROLOGIC ABO: CPT

## 2023-03-28 PROCEDURE — 86803 HEPATITIS C AB TEST: CPT

## 2023-03-28 PROCEDURE — C1713: CPT

## 2023-03-28 PROCEDURE — 85025 COMPLETE CBC W/AUTO DIFF WBC: CPT

## 2023-03-28 PROCEDURE — 86850 RBC ANTIBODY SCREEN: CPT

## 2023-03-28 PROCEDURE — 97165 OT EVAL LOW COMPLEX 30 MIN: CPT

## 2023-03-28 PROCEDURE — 94660 CPAP INITIATION&MGMT: CPT

## 2023-03-28 PROCEDURE — 86901 BLOOD TYPING SEROLOGIC RH(D): CPT

## 2023-03-28 PROCEDURE — C1889: CPT

## 2023-03-28 PROCEDURE — 76000 FLUOROSCOPY <1 HR PHYS/QHP: CPT

## 2023-03-28 RX ORDER — PANTOPRAZOLE SODIUM 20 MG/1
1 TABLET, DELAYED RELEASE ORAL
Qty: 0 | Refills: 0 | DISCHARGE
Start: 2023-03-28

## 2023-03-28 RX ORDER — OXYCODONE HYDROCHLORIDE 5 MG/1
1 TABLET ORAL
Qty: 0 | Refills: 0 | DISCHARGE

## 2023-03-28 RX ORDER — ACETAMINOPHEN 500 MG
2 TABLET ORAL
Qty: 0 | Refills: 0 | DISCHARGE
Start: 2023-03-28

## 2023-03-28 RX ORDER — ATORVASTATIN CALCIUM 80 MG/1
40 TABLET, FILM COATED ORAL ONCE
Refills: 0 | Status: DISCONTINUED | OUTPATIENT
Start: 2023-03-28 | End: 2023-03-28

## 2023-03-28 RX ORDER — HYDROXYCHLOROQUINE SULFATE 200 MG
200 TABLET ORAL ONCE
Refills: 0 | Status: COMPLETED | OUTPATIENT
Start: 2023-03-28 | End: 2023-03-28

## 2023-03-28 RX ORDER — HYDROMORPHONE HYDROCHLORIDE 2 MG/ML
1 INJECTION INTRAMUSCULAR; INTRAVENOUS; SUBCUTANEOUS
Qty: 42 | Refills: 0
Start: 2023-03-28 | End: 2023-04-03

## 2023-03-28 RX ORDER — OXYCODONE HYDROCHLORIDE 5 MG/1
1 TABLET ORAL
Refills: 0 | DISCHARGE

## 2023-03-28 RX ORDER — SULFASALAZINE 500 MG
500 TABLET ORAL
Refills: 0 | Status: DISCONTINUED | OUTPATIENT
Start: 2023-03-28 | End: 2023-03-28

## 2023-03-28 RX ORDER — ATORVASTATIN CALCIUM 80 MG/1
1 TABLET, FILM COATED ORAL
Qty: 0 | Refills: 0 | DISCHARGE

## 2023-03-28 RX ORDER — SENNA PLUS 8.6 MG/1
2 TABLET ORAL
Qty: 0 | Refills: 0 | DISCHARGE
Start: 2023-03-28

## 2023-03-28 RX ORDER — CYCLOBENZAPRINE HYDROCHLORIDE 10 MG/1
1 TABLET, FILM COATED ORAL
Qty: 21 | Refills: 0
Start: 2023-03-28 | End: 2023-04-03

## 2023-03-28 RX ORDER — ASPIRIN/CALCIUM CARB/MAGNESIUM 324 MG
1 TABLET ORAL
Qty: 30 | Refills: 0
Start: 2023-03-28 | End: 2023-04-26

## 2023-03-28 RX ORDER — ATORVASTATIN CALCIUM 80 MG/1
1 TABLET, FILM COATED ORAL
Qty: 0 | Refills: 0 | DISCHARGE
Start: 2023-03-28

## 2023-03-28 RX ORDER — ASPIRIN/CALCIUM CARB/MAGNESIUM 324 MG
0 TABLET ORAL
Qty: 0 | Refills: 0 | DISCHARGE

## 2023-03-28 RX ADMIN — Medication 1000 MILLIGRAM(S): at 13:57

## 2023-03-28 RX ADMIN — Medication 1000 MILLIGRAM(S): at 06:05

## 2023-03-28 RX ADMIN — Medication 50 MILLIGRAM(S): at 13:56

## 2023-03-28 RX ADMIN — Medication 10 MILLIGRAM(S): at 13:10

## 2023-03-28 RX ADMIN — OXYCODONE HYDROCHLORIDE 10 MILLIGRAM(S): 5 TABLET ORAL at 18:40

## 2023-03-28 RX ADMIN — PANTOPRAZOLE SODIUM 40 MILLIGRAM(S): 20 TABLET, DELAYED RELEASE ORAL at 06:05

## 2023-03-28 RX ADMIN — Medication 6 MILLIGRAM(S): at 12:12

## 2023-03-28 RX ADMIN — Medication 100 MILLIGRAM(S): at 02:15

## 2023-03-28 RX ADMIN — Medication 500 MILLIGRAM(S): at 18:10

## 2023-03-28 RX ADMIN — OXYCODONE HYDROCHLORIDE 5 MILLIGRAM(S): 5 TABLET ORAL at 00:20

## 2023-03-28 RX ADMIN — HYDROMORPHONE HYDROCHLORIDE 0.5 MILLIGRAM(S): 2 INJECTION INTRAMUSCULAR; INTRAVENOUS; SUBCUTANEOUS at 14:15

## 2023-03-28 RX ADMIN — Medication 50 MILLIGRAM(S): at 06:05

## 2023-03-28 RX ADMIN — POLYETHYLENE GLYCOL 3350 17 GRAM(S): 17 POWDER, FOR SOLUTION ORAL at 12:23

## 2023-03-28 RX ADMIN — HYDROMORPHONE HYDROCHLORIDE 0.5 MILLIGRAM(S): 2 INJECTION INTRAMUSCULAR; INTRAVENOUS; SUBCUTANEOUS at 13:57

## 2023-03-28 RX ADMIN — Medication 200 MILLIGRAM(S): at 13:09

## 2023-03-28 RX ADMIN — OXYCODONE HYDROCHLORIDE 10 MILLIGRAM(S): 5 TABLET ORAL at 18:11

## 2023-03-28 RX ADMIN — Medication 6 MILLIGRAM(S): at 02:20

## 2023-03-28 RX ADMIN — Medication 6 MILLIGRAM(S): at 08:45

## 2023-03-28 RX ADMIN — OXYCODONE HYDROCHLORIDE 5 MILLIGRAM(S): 5 TABLET ORAL at 09:00

## 2023-03-28 RX ADMIN — OXYCODONE HYDROCHLORIDE 5 MILLIGRAM(S): 5 TABLET ORAL at 01:20

## 2023-03-28 RX ADMIN — OXYCODONE HYDROCHLORIDE 5 MILLIGRAM(S): 5 TABLET ORAL at 09:30

## 2023-03-28 NOTE — DISCHARGE NOTE NURSING/CASE MANAGEMENT/SOCIAL WORK - PATIENT PORTAL LINK FT
You can access the FollowMyHealth Patient Portal offered by [facility] by registering at the following website: [email or web address]. By joining [facility]’s FollowMyHealth portal, you will also be able to view your health information using other applications (apps) compatible with our system.

## 2023-03-28 NOTE — DISCHARGE NOTE PROVIDER - HOSPITAL COURSE
Admitted on 3/27  Attending: Dr. Elias   Surgery: ACDF C3-4   Lizzette-op Antibiotics  Pain control  DVT prophylaxis  OOB/Occupational Therapy

## 2023-03-28 NOTE — PROGRESS NOTE ADULT - SUBJECTIVE AND OBJECTIVE BOX
Ortho Note    Subjective:  Pt comfortable without complaints, pain controlled with current pain medication regimen.   Denies CP, SOB, N/V, numbness/tingling   Reviewed plan of care with patient at bedside      Vital Signs Last 24 Hrs  T(C): 36.7 (03-28-23 @ 08:22), Max: 36.7 (03-28-23 @ 08:22)  T(F): 98.1 (03-28-23 @ 08:22), Max: 98.1 (03-28-23 @ 08:22)  HR: 77 (03-28-23 @ 09:45) (75 - 77)  BP: 122/93 (03-28-23 @ 08:22) (122/93 - 122/93)  BP(mean): --  RR: 18 (03-28-23 @ 08:22) (18 - 18)  SpO2: 98% (03-28-23 @ 09:45) (98% - 100%)  AVSS    Objective:    Physical Exam:  General: Pt Alert and oriented, NAD  cervical DSG C/D/I in hard cervical collar,  hv x1  Pulses: +2 radial pulses, wwp toes   Sensation: silt intact bilaterally upper extremeties  Motor: ain/pin/uin- 5/5 bilateral upper extremities         Plan of Care:  A/P: 63yFemale POD#1 s/p ACDF C2-C4,   - afebrile  - Pain Control- tylenol 1000mg PO Q8h, flexeril 5mg PO TID prn muscle spasms, Dilaudid 0.5mg Q4h prn breakthrough pain, Oxycodone 5-10mg PO Q4h prn moderate to severe pain, lyrica 50mg PO TID   - DVT ppx: scds  - PT, WBS: WBAT  - appreciate medicine recs  - ambulate with ot as tolerated  - bowel regimen, IS use, PPI  - dc hv today   - Dispo- home pending ot clearance and dc of drains     Ortho Pager [phone]

## 2023-03-28 NOTE — DISCHARGE NOTE PROVIDER - NSDCFUADDINST_GEN_ALL_CORE_FT
s/p: ACDF C2-4     ACTIVITY:   - Wear cervical collar as directed until your follow up with Dr. Elias.   - No extreme bending, extending, turning, twisting, or straining. No strenuous activity, heavy lifting, driving or returning to work until cleared by your surgeon.     DRESSING/SHOWERING:    You had a drain that was removed prior to discharge   -Change dressing daily until post-op day 5. Sponge bathe until post-op day 5 then may take full shower. Once dressing removed keep incision clean and dry. Do not pick at your incision. Do not apply creams, ointments or oils to your incision until cleared by your surgeon. Do not soak your incision in sitting water (ie tubs, pools, lakes, etc.) until cleared by your surgeon.      MEDICATION/ANTICOAGULATION:   - You have been prescribed medications for pain:   - Tylenol (Acetaminophen) for mild to moderate pain. Do not exceed 3,000mg daily.   - For more severe pain, you may continue to take the Tylenol with the addition of narcotic pain medication. Take this medication as prescribed. Do not take more than prescribed. Note that this medication may cause drowsiness or dizziness. Do not operate machinery. This medication may cause constipation.   - If you have been prescribed a muscle relaxer, take this medication as needed for muscle spasm. Follow instructions on bottle.     - Try to have regular bowel movements. Take stool softener or laxative if necessary. You may wish to take Miralax daily until you have regular bowel movements.    - Do not take antiinflammatories (Aleve, Advil, Naproxen, Ibuprofen, etc.) until cleared by your surgeon. Tylenol is not an anti-inflammatory and okay to take (see above).   - If you have a pain management physician, please follow-up with them postoperatively.    - If you experience any negative side effects of your medications, please call your surgeon's office to discuss.     FOLLOW UP:   - Call to schedule an appt with Dr. Ortiz for follow up.    - Please follow-up with your primary care physician or any other specialist you see postoperatively, if needed.    - Contact your doctor or go to the emergency room if you experience: fever greater than 101.5, chills, chest pain, difficulty breathing, redness or excessive drainage around the incision, other concerns.   s/p: ACDF C2-4     ACTIVITY:   - Wear cervical collar as directed until your follow up with Dr. Elias.   - No extreme bending, extending, turning, twisting, or straining. No strenuous activity, heavy lifting, driving or returning to work until cleared by your surgeon.     DRESSING/SHOWERING:    You had a drain that was removed prior to discharge   -Change dressing daily until post-op day 5. Sponge bathe until post-op day 5 then may take full shower. Once dressing removed keep incision clean and dry. Do not pick at your incision. Do not apply creams, ointments or oils to your incision until cleared by your surgeon. Do not soak your incision in sitting water (ie tubs, pools, lakes, etc.) until cleared by your surgeon.      MEDICATION/ANTICOAGULATION:   - You have been prescribed medications for pain:   - Tylenol (Acetaminophen) for mild to moderate pain. Do not exceed 3,000mg daily.   - For more severe pain, you may continue to take the Tylenol with the addition of narcotic pain medication. Take this medication as prescribed. Do not take more than prescribed. Note that this medication may cause drowsiness or dizziness. Do not operate machinery. This medication may cause constipation.   - If you have been prescribed a muscle relaxer, take this medication as needed for muscle spasm. Follow instructions on bottle.     - Try to have regular bowel movements. Take stool softener or laxative if necessary. You may wish to take Miralax daily until you have regular bowel movements.    - Do not take antiinflammatories (Aleve, Advil, Naproxen, Ibuprofen, etc.) until cleared by your surgeon. Tylenol is not an anti-inflammatory and okay to take (see above).   - If you have a pain management physician, please follow-up with them postoperatively.    - If you experience any negative side effects of your medications, please call your surgeon's office to discuss.     FOLLOW UP:   - Call to schedule an appt with Dr. Ortiz for follow up.    - Please follow-up with your primary care physician or any other specialist you see postoperatively, if needed.    - Contact your doctor or go to the emergency room if you experience: fever greater than 101.5, chills, chest pain, difficulty breathing, redness or excessive drainage around the incision, other concerns.  Patient would benefit from additional home health aid hours.   Okay to restart home aspirin on 3-28

## 2023-03-28 NOTE — DISCHARGE NOTE PROVIDER - NSDCCPTREATMENT_GEN_ALL_CORE_FT
PRINCIPAL PROCEDURE  Procedure: Anterior cervical discectomy with fusion (ACDF)  Findings and Treatment:

## 2023-03-28 NOTE — OCCUPATIONAL THERAPY INITIAL EVALUATION ADULT - ADDITIONAL COMMENTS
Per pt. she lives in an elevator accessible building alone, and has HHA 4x/5 hours to assist in IADLs. Pt. utilizes a RW and SC at baseline. She reports independence in ADLs prior.

## 2023-03-28 NOTE — DISCHARGE NOTE PROVIDER - NSDCMRMEDTOKEN_GEN_ALL_CORE_FT
aspirin 81 mg oral tablet:   Chantix 1 mg oral tablet: 1 tab(s) orally 2 times a day  Colace 100 mg oral capsule: 1 cap(s) orally 2 times a day  hydroCHLOROthiazide 12.5 mg oral capsule: 1 cap(s) orally once a day  Lipitor 40 mg oral tablet: 1 tab(s) orally once a day  Plaquenil 200 mg oral tablet: 1 tab(s) orally once a day  predniSONE 10 mg oral tablet: 1 tab(s) orally once a day  sulfaSALAzine 500 mg oral tablet: 1 orally 2 times a day   acetaminophen 500 mg oral tablet: 2 tab(s) orally every 8 hours as needed for  mild pain  aspirin 81 mg oral capsule: 1 cap(s) orally once a day Home medication  atorvastatin 40 mg oral tablet: 1 tab(s) orally once  Chantix 1 mg oral tablet: 1 tab(s) orally 2 times a day  Colace 100 mg oral capsule: 1 cap(s) orally 2 times a day  cyclobenzaprine 5 mg oral tablet: 1 tab(s) orally 3 times a day as needed for Muscle Spasm MDD: 3  Dilaudid 2 mg oral tablet: 1 tab(s) orally every 4 hours as needed for  severe pain MDD: 6  hydroCHLOROthiazide 12.5 mg oral capsule: 1 cap(s) orally once a day  pantoprazole 40 mg oral delayed release tablet: 1 tab(s) orally once a day (before a meal)  Plaquenil 200 mg oral tablet: 1 tab(s) orally once a day  predniSONE 10 mg oral tablet: 1 tab(s) orally once a day  pregabalin 50 mg oral capsule: 1 cap(s) orally 3 times a day MDD: 3  senna leaf extract oral tablet: 2 tab(s) orally once a day (at bedtime)  sulfaSALAzine 500 mg oral tablet: 1 orally 2 times a day

## 2023-03-28 NOTE — OCCUPATIONAL THERAPY INITIAL EVALUATION ADULT - GENERAL OBSERVATIONS, REHAB EVAL
TANYA fisher pt. for OOB. Pt. received semi-supine in bed, +[place] J, +tele, +heplock,+prevena in NAD agreeable to therapy session.

## 2023-03-28 NOTE — OCCUPATIONAL THERAPY INITIAL EVALUATION ADULT - DIAGNOSIS, OT EVAL
Pt. admitted to [facility] for C2-C4 ACDF. Upon assessment, pt. presents with slight deficits in balance and postural control, strength and endurance.

## 2023-03-28 NOTE — DISCHARGE NOTE PROVIDER - ATTENDING ATTESTATION STATEMENT
Notification of Discharge   This is a Notification of Discharge from our facility [address]  Please be advised that this patient has been discharge from our facility  Below you will find the admission and discharge date and time including the patient’s disposition  UTILIZATION REVIEW CONTACT:  Micki End  Utilization   Network Utilization Review Department  Phone: [phone] x carefully listen to the prompts  All voicemails are confidential   Email: [email or web address]     PHYSICIAN ADVISORY SERVICES:  FOR [facility] REVIEW - MEDICAL NECESSITY DENIAL  Phone: [phone]  Fax: [phone]  Email: [email or web address]     PRESENTATION DATE: 10/5/2022  2:42 PM  OBERVATION ADMISSION DATE:   INPATIENT ADMISSION DATE: 10/6/22  3:15 PM   DISCHARGE DATE: 10/9/2022  2:06 PM  DISPOSITION: Home with [place] with [address] INFORMATION:  Send all requests for admission clinical reviews, approved or denied determinations and any other requests to dedicated fax number below belonging to the campus where the patient is receiving treatment   List of dedicated fax numbers:  [address] [place] [address] DENIALS (Administrative/Medical Necessity) [phone]   [address] [place] [address] (Maternity/NICU/Pediatrics) [phone]   [facility] [phone]   [place] [address] [phone]   [address] [phone]   [address] [facility] [phone]   [address] [place] [address] Road [phone]   [address] [place] [address] 119 [phone]   [facility]  [phone]   [address] [facility] [phone]   [address] [place] [address] [address] [facility] [phone] I have personally seen and examined the patient. I have collaborated with and supervised the

## 2023-03-28 NOTE — DISCHARGE NOTE PROVIDER - CARE PROVIDER_API CALL
Dru Elias)  [facility]  [address] [place] [address], 10[address] [place] [address]  Phone: [phone]  Fax: [phone]  Follow Up Time: 2 weeks

## 2023-03-28 NOTE — DISCHARGE NOTE NURSING/CASE MANAGEMENT/SOCIAL WORK - NSDCPEFALRISK_GEN_ALL_CORE
For information on Fall & Injury Prevention, visit: [email or web address] OR  [email or web address] OR  https://www.cdc.gov/steadi/patient.html

## 2023-03-28 NOTE — DISCHARGE NOTE PROVIDER - NSDCCPCAREPLAN_GEN_ALL_CORE_FT
PRINCIPAL DISCHARGE DIAGNOSIS  Diagnosis: Cervical spinal stenosis  Assessment and Plan of Treatment:      PRINCIPAL DISCHARGE DIAGNOSIS  Diagnosis: Cervical spinal stenosis  Assessment and Plan of Treatment: s/p ACDF

## 2023-03-28 NOTE — PROGRESS NOTE ADULT - SUBJECTIVE AND OBJECTIVE BOX
Ortho AM Progress Note     Procedure: ACDF C2-C4  Surgeon: Dr. Curt THOMAS, pain well controlled.  Denies CP, SOB, N/V, numbness/tingling, dizziness, lightheadedness.       Vital Signs Last 24 Hrs  T(C): 36.8 (28 Mar 2023 00:19), Max: 36.8 (27 Mar 2023 12:17)  T(F): 98.2 (28 Mar 2023 00:19), Max: 98.2 (27 Mar 2023 12:17)  HR: 68 (28 Mar 2023 00:19) (68 - 83)  BP: 111/66 (28 Mar 2023 00:19) (111/66 - 152/85)  BP(mean): 93 (27 Mar 2023 15:17) (87 - 105)  RR: 18 (28 Mar 2023 00:19) (13 - 23)  SpO2: 96% (28 Mar 2023 00:19) (95% - 100%)    Parameters below as of 28 Mar 2023 00:19  Patient On (Oxygen Delivery Method): room air        General: Pt Alert and oriented, NAD  DSG C/D/I- telfa and tegaderm anteriorly neck, soft collar, HV x 1 holding suction  Pulses: 2+ radial pulses bilaterally, brisk cap refill   Sensation: SILT bilateral upper extremities  Motor: AIN/PIN/Ulnar/Median: 5/5 bilaterally, : 5/5 bilaterally        A/P: 63yFemale POD#1 s/p ACDF C2-C4 with Dr. Elias  - Stable  - Pain Control  - Continue with ortega until OOB and ambulating  - Continue to monitor drain output  - DVT ppx: SCDs  - Post op abx: Ancef  - PT, WBS: WBAT, OT consult    Ortho Pager [phone]

## 2023-03-28 NOTE — DISCHARGE NOTE PROVIDER - NSDCFUSCHEDAPPT_GEN_ALL_CORE_FT
[facility]  [facility] [address]  Scheduled Appointment: 04/06/2023

## 2023-03-28 NOTE — OCCUPATIONAL THERAPY INITIAL EVALUATION ADULT - PERTINENT HX OF CURRENT PROBLEM, REHAB EVAL
63F with neck pain x about 2 years. Reports worsening pain in the past few months. Pain persists despite conservative measures of physical therapy, steroid injections and pain management care. Ambulates with a cane and walker. Patient endorses the feeling of tingling in her bilateral finger tip intermittently and feeling weaker in her arms. Manages pain with a regime from her pain management provider. Denies numbness to bilateral arms.

## 2023-03-29 PROBLEM — E78.5 HYPERLIPIDEMIA, UNSPECIFIED: Chronic | Status: ACTIVE | Noted: 2023-03-24

## 2023-03-29 PROBLEM — M19.90 UNSPECIFIED OSTEOARTHRITIS, UNSPECIFIED SITE: Chronic | Status: ACTIVE | Noted: 2023-03-24

## 2023-03-29 PROBLEM — I10 ESSENTIAL (PRIMARY) HYPERTENSION: Chronic | Status: ACTIVE | Noted: 2023-03-24

## 2023-03-29 PROBLEM — G47.33 OBSTRUCTIVE SLEEP APNEA (ADULT) (PEDIATRIC): Chronic | Status: ACTIVE | Noted: 2023-03-24

## 2023-03-29 PROBLEM — M32.9 SYSTEMIC LUPUS ERYTHEMATOSUS, UNSPECIFIED: Chronic | Status: ACTIVE | Noted: 2023-03-24

## 2023-03-31 ENCOUNTER — NON-APPOINTMENT (OUTPATIENT)
Age: 63
End: 2023-03-31

## 2023-04-04 ENCOUNTER — APPOINTMENT (OUTPATIENT)
Dept: ORTHOPEDIC SURGERY | Facility: CLINIC | Age: 63
End: 2023-04-04
Payer: MEDICARE

## 2023-04-04 PROCEDURE — 72040 X-RAY EXAM NECK SPINE 2-3 VW: CPT

## 2023-04-04 PROCEDURE — 99024 POSTOP FOLLOW-UP VISIT: CPT

## 2023-04-05 NOTE — HISTORY OF PRESENT ILLNESS
[Chills] : no chills [Constipation] : no constipation [Diarrhea] : no diarrhea [Dysuria] : no dysuria [Fever] : no fever [Nausea] : no nausea [Vomiting] : no vomiting [de-identified] : s/p ACDF C4-C6 (3/27/23) [de-identified] : 63 year old female s/p ACDF C4-C6 (3/27/23). She is here for evaluation of swelling in her right shoulder. She says it has resolved.  She denies any swelling in the area of her surgery (left sided approach).  Her pain is well controlled. She is tolerating a regular diet. She is compliant with her cervical orthosis. She denies radicular pain, recent illness, fevers, numbness, weakness, balance problems, saddle anesthesia, urinary retention or fecal incontinence. [de-identified] : I ordered radiographs to evaluate the patient's symptoms.\par \par Cervical 2 view radiographs taken in the office today show no dislocation or fracture. Cervical spondylosis.  s/p ACDF C4-C6 [de-identified] : MSK:\par Spine: \par incision c/d/i\par no swelling\par \par NEURO:\par Sensation \par          Left           \par C5     2/2               \par C6     2/2               \par C7     2/2               \par C8     2/2              \par T1     2/2             \par \par          Right         \par C5     2/2               \par C6     2/2               \par C7     2/2               \par C8     2/2              \par T1     2/2      \par \par Motor: \par                                                Left             \par C5 (deltoid abduction)             5/5               \par C6 (biceps flexion)                   5/5                \par C7 (triceps extension)             5/5               \par C8 (finger flexion)                     5/5               \par T1 (interosseous)                     5/5           \par \par                                                Right           \par C5 (deltoid abduction)             5/5               \par C6 (biceps flexion)                   5/5                \par C7 (triceps extension)             5/5               \par C8 (finger flexion)                     5/5               \par T1 (interosseous)                     5/5                     \par \par Sensation \par Left L2  -  2/2            \par Left L3  -  2/2\par Left L4  -  2/2\par Left L5  -  2/2\par Left S1  -  2/2\par \par Right L2  -  2/2            \par Right L3  -  2/2\par Right L4  -  2/2\par Right L5  -  2/2\par Right S1  -  2/2\par \par Motor: \par Left L2 (hip flexion)                            5/5                \par Left L3 (knee extension)                   5/5                \par Left L4 (ankle dorsiflexion)                 5/5                \par Left L5 (long toe extensor)                5/5                \par Left S1 (ankle plantar flexion)           5/5\par \par Right L2 (hip flexion)                            5/5                \par Right L3 (knee extension)                   5/5                \par Right L4 (ankle dorsiflexion)                 5/5                \par Right L5 (long toe extensor)                5/5                \par Right S1 (ankle plantar flexion)           5/5 [de-identified] : 63 year old female s/p ACDF C4-C6 (3/27/23).  [de-identified] : Patient is here for evaluation of right shoulder swelling that has resolved. She is progressing well. Her radicular pain has improved. She is neurologically intact.  She is tolerating a regular diet. She should continue her cervical orthosis. She will continue no heavy lifting.  She will followup in 2 weeks. We discussed red flag symptoms that would require emergent evaluation. She knows to call with any questions or concerns or if her symptoms acutely worsen.

## 2023-04-06 DIAGNOSIS — M62.838 OTHER MUSCLE SPASM: ICD-10-CM

## 2023-04-06 DIAGNOSIS — M32.9 SYSTEMIC LUPUS ERYTHEMATOSUS, UNSPECIFIED: ICD-10-CM

## 2023-04-06 DIAGNOSIS — Z79.82 LONG TERM (CURRENT) USE OF ASPIRIN: ICD-10-CM

## 2023-04-06 DIAGNOSIS — G47.33 OBSTRUCTIVE SLEEP APNEA (ADULT) (PEDIATRIC): ICD-10-CM

## 2023-04-06 DIAGNOSIS — E78.5 HYPERLIPIDEMIA, UNSPECIFIED: ICD-10-CM

## 2023-04-06 DIAGNOSIS — I10 ESSENTIAL (PRIMARY) HYPERTENSION: ICD-10-CM

## 2023-04-06 DIAGNOSIS — M54.2 CERVICALGIA: ICD-10-CM

## 2023-04-06 DIAGNOSIS — Z79.52 LONG TERM (CURRENT) USE OF SYSTEMIC STEROIDS: ICD-10-CM

## 2023-04-06 DIAGNOSIS — M43.12 SPONDYLOLISTHESIS, CERVICAL REGION: ICD-10-CM

## 2023-04-06 DIAGNOSIS — M54.12 RADICULOPATHY, CERVICAL REGION: ICD-10-CM

## 2023-04-06 DIAGNOSIS — M19.90 UNSPECIFIED OSTEOARTHRITIS, UNSPECIFIED SITE: ICD-10-CM

## 2023-04-06 DIAGNOSIS — M48.02 SPINAL STENOSIS, CERVICAL REGION: ICD-10-CM

## 2023-04-06 DIAGNOSIS — Z87.891 PERSONAL HISTORY OF NICOTINE DEPENDENCE: ICD-10-CM

## 2023-04-11 ENCOUNTER — APPOINTMENT (OUTPATIENT)
Dept: ORTHOPEDIC SURGERY | Facility: CLINIC | Age: 63
End: 2023-04-11
Payer: MEDICARE

## 2023-04-11 PROCEDURE — 99024 POSTOP FOLLOW-UP VISIT: CPT

## 2023-04-11 PROCEDURE — 72040 X-RAY EXAM NECK SPINE 2-3 VW: CPT

## 2023-04-11 RX ORDER — DIAZEPAM 5 MG/1
5 TABLET ORAL TWICE DAILY
Qty: 42 | Refills: 0 | Status: ACTIVE | COMMUNITY
Start: 2023-04-11 | End: 1900-01-01

## 2023-04-11 NOTE — HISTORY OF PRESENT ILLNESS
[Chills] : no chills [Constipation] : no constipation [Diarrhea] : no diarrhea [Dysuria] : no dysuria [Fever] : no fever [Nausea] : no nausea [Vomiting] : no vomiting [de-identified] : s/p ACDF C4-C6 (3/27/23) [de-identified] : 63 year old female s/p ACDF C4-C6 (3/27/23). Since her last visit pain is improving. No further episodes of swelling. Her pain is well controlled. She is tolerating a regular diet. She is compliant with her cervical orthosis. She denies radicular pain, recent illness, fevers, numbness, weakness, balance problems, saddle anesthesia, urinary retention or fecal incontinence. [de-identified] : MSK:\par Spine: \par incision well healed\par no swelling\par \par NEURO:\par Sensation \par          Left           \par C5     2/2               \par C6     2/2               \par C7     2/2               \par C8     2/2              \par T1     2/2             \par \par          Right         \par C5     2/2               \par C6     2/2               \par C7     2/2               \par C8     2/2              \par T1     2/2      \par \par Motor: \par                                                Left             \par C5 (deltoid abduction)             5/5               \par C6 (biceps flexion)                   5/5                \par C7 (triceps extension)             5/5               \par C8 (finger flexion)                     5/5               \par T1 (interosseous)                     5/5           \par \par                                                Right           \par C5 (deltoid abduction)             5/5               \par C6 (biceps flexion)                   5/5                \par C7 (triceps extension)             5/5               \par C8 (finger flexion)                     5/5               \par T1 (interosseous)                     5/5                     \par \par Sensation \par Left L2  -  2/2            \par Left L3  -  2/2\par Left L4  -  2/2\par Left L5  -  2/2\par Left S1  -  2/2\par \par Right L2  -  2/2            \par Right L3  -  2/2\par Right L4  -  2/2\par Right L5  -  2/2\par Right S1  -  2/2\par \par Motor: \par Left L2 (hip flexion)                            5/5                \par Left L3 (knee extension)                   5/5                \par Left L4 (ankle dorsiflexion)                 5/5                \par Left L5 (long toe extensor)                5/5                \par Left S1 (ankle plantar flexion)           5/5\par \par Right L2 (hip flexion)                            5/5                \par Right L3 (knee extension)                   5/5                \par Right L4 (ankle dorsiflexion)                 5/5                \par Right L5 (long toe extensor)                5/5                \par Right S1 (ankle plantar flexion)           5/5 [de-identified] : I ordered radiographs to evaluate the patient's symptoms.\par \par Cervical 2 view radiographs taken in the office today show no dislocation or fracture. Cervical spondylosis.  s/p ACDF C4-C6 [de-identified] : 63 year old female s/p ACDF C4-C6 (3/27/23).  [de-identified] : She is progressing well. Her radicular pain has improved. She is neurologically intact.  She is tolerating a regular diet. She should continue her cervical orthosis. She will continue no heavy lifting.  She will followup in 4 weeks. We discussed red flag symptoms that would require emergent evaluation. She knows to call with any questions or concerns or if her symptoms acutely worsen.

## 2023-04-14 ENCOUNTER — APPOINTMENT (OUTPATIENT)
Dept: RHEUMATOLOGY | Facility: CLINIC | Age: 63
End: 2023-04-14

## 2023-05-16 ENCOUNTER — APPOINTMENT (OUTPATIENT)
Dept: ORTHOPEDIC SURGERY | Facility: CLINIC | Age: 63
End: 2023-05-16
Payer: MEDICARE

## 2023-05-16 PROCEDURE — 72040 X-RAY EXAM NECK SPINE 2-3 VW: CPT

## 2023-05-16 PROCEDURE — 99024 POSTOP FOLLOW-UP VISIT: CPT

## 2023-05-16 NOTE — HISTORY OF PRESENT ILLNESS
[Chills] : no chills [Constipation] : no constipation [Diarrhea] : no diarrhea [Dysuria] : no dysuria [Fever] : no fever [Nausea] : no nausea [Vomiting] : no vomiting [de-identified] : s/p ACDF C4-C6 (3/27/23) [de-identified] : 63 year old female s/p ACDF C4-C6 (3/27/23). She is progressing well. She has transitioned out of her collar.  Minimal neck discomfort at times.  She denies radicular pain, recent illness, fevers, numbness, weakness, balance problems, saddle anesthesia, urinary retention or fecal incontinence. [de-identified] : MSK:\par Spine: \par incision well healed\par no swelling\par \par NEURO:\par Sensation \par          Left           \par C5     2/2               \par C6     2/2               \par C7     2/2               \par C8     2/2              \par T1     2/2             \par \par          Right         \par C5     2/2               \par C6     2/2               \par C7     2/2               \par C8     2/2              \par T1     2/2      \par \par Motor: \par                                                Left             \par C5 (deltoid abduction)             5/5               \par C6 (biceps flexion)                   5/5                \par C7 (triceps extension)             5/5               \par C8 (finger flexion)                     5/5               \par T1 (interosseous)                     5/5           \par \par                                                Right           \par C5 (deltoid abduction)             5/5               \par C6 (biceps flexion)                   5/5                \par C7 (triceps extension)             5/5               \par C8 (finger flexion)                     5/5               \par T1 (interosseous)                     5/5                     \par \par Sensation \par Left L2  -  2/2            \par Left L3  -  2/2\par Left L4  -  2/2\par Left L5  -  2/2\par Left S1  -  2/2\par \par Right L2  -  2/2            \par Right L3  -  2/2\par Right L4  -  2/2\par Right L5  -  2/2\par Right S1  -  2/2\par \par Motor: \par Left L2 (hip flexion)                            5/5                \par Left L3 (knee extension)                   5/5                \par Left L4 (ankle dorsiflexion)                 5/5                \par Left L5 (long toe extensor)                5/5                \par Left S1 (ankle plantar flexion)           5/5\par \par Right L2 (hip flexion)                            5/5                \par Right L3 (knee extension)                   5/5                \par Right L4 (ankle dorsiflexion)                 5/5                \par Right L5 (long toe extensor)                5/5                \par Right S1 (ankle plantar flexion)           5/5 [de-identified] : I ordered radiographs to evaluate the patient's symptoms.\par \par Cervical 2 view radiographs taken in the office today show no dislocation or fracture. Cervical spondylosis.  s/p ACDF C4-C6 [de-identified] : 63 year old female s/p ACDF C4-C6 (3/27/23).  [de-identified] : She is progressing well. Radicular pain resolved.  She is neurologically intact.  She is tolerating a regular diet. She has discontinued her cervical orthosis.  She will followup in 8 weeks. We discussed red flag symptoms that would require emergent evaluation. She knows to call with any questions or concerns or if her symptoms acutely worsen.

## 2023-05-23 NOTE — PATIENT PROFILE ADULT - ARE SIGNIFICANT INDICATORS COMPLETE.
Continued Stay HENRIQUE/FELICITA Assessment/Plan of Care Note       Active Substitute Decision Maker (SDM)     Ibrahima Dias Surrogate Primary Contact - Son   Primary Phone: [phone] (Mobile)                   Progress note: HENRIQUE reviewed referrals via [facility] and also called Liasian to discuss if pt is clinically accepted at [facility] or [facility]. HENRIQUE called and left vm with Park Smith/[facility] and Luis Avelar/[facility] re: referral. Pending response back.    Update 11:53 AM HENRIQUE rec'd call from Luis Avelar who discussed that [facility] is unable to accept the pt.  SW will f/u with Park HAMILTON.    Update 2:21 PM HENRIQUE spoke with Park Smith and she discussed that the facility needs to know how many times is pt being suctioned. HENRIQUE called RN to clarify and RN/Nini discussed she will call SW back and let SW know. Pending response back.     See HENRIQUE/CM flowsheets for other objective data.    Disposition Recommendations:  Preliminary discharge destination:    SW/FELICITA recommendation for discharge:      Discharge Plan/Needs:     Continued Care and Services - Admitted Since 5/16/2023    Coordination has not been started for this encounter.           Devices/ Equipment that need to be arranged for discharge:     Accepted   Pending insurance authorization   Others:    Anticipated date of DME availability:     Prior To Hospitalization:    Living Situation: Supervised living and residing at Assisted living    .  Support Systems: Family members   Home Devices/Equipment: None (from nursing home) ,   ,    ,      Mobility Assist Devices: Slide board/sheet   Type of Service Prior to Hospitalization: Nurse (specify) ,   ,   ,   ,    ,       Patient/Family discharge goal (s):        Resources provided:           Therapy Recommendations for Discharge:   PT:    No value filed.  OT:     No value filed.  SLP:  No value filed.    Mobility Equipment Recommended for Discharge:        Barriers to Discharge  Identified Barriers to  No Discharge/Transition Planning:                   Yes

## 2023-07-05 ENCOUNTER — RX RENEWAL (OUTPATIENT)
Age: 63
End: 2023-07-05

## 2023-07-18 ENCOUNTER — APPOINTMENT (OUTPATIENT)
Dept: ORTHOPEDIC SURGERY | Facility: CLINIC | Age: 63
End: 2023-07-18
Payer: MEDICARE

## 2023-07-18 DIAGNOSIS — G95.20 UNSPECIFIED CORD COMPRESSION: ICD-10-CM

## 2023-07-18 DIAGNOSIS — M54.12 RADICULOPATHY, CERVICAL REGION: ICD-10-CM

## 2023-07-18 PROCEDURE — 99214 OFFICE O/P EST MOD 30 MIN: CPT | Mod: 25

## 2023-07-18 PROCEDURE — 72040 X-RAY EXAM NECK SPINE 2-3 VW: CPT

## 2023-07-21 ENCOUNTER — APPOINTMENT (OUTPATIENT)
Dept: RHEUMATOLOGY | Facility: CLINIC | Age: 63
End: 2023-07-21
Payer: MEDICARE

## 2023-07-21 VITALS
OXYGEN SATURATION: 96 % | SYSTOLIC BLOOD PRESSURE: 125 MMHG | BODY MASS INDEX: 32.72 KG/M2 | WEIGHT: 158 LBS | HEIGHT: 58.27 IN | TEMPERATURE: 97.7 F | HEART RATE: 77 BPM | DIASTOLIC BLOOD PRESSURE: 80 MMHG

## 2023-07-21 DIAGNOSIS — M54.2 CERVICALGIA: ICD-10-CM

## 2023-07-21 DIAGNOSIS — G89.29 CERVICALGIA: ICD-10-CM

## 2023-07-21 PROCEDURE — 99214 OFFICE O/P EST MOD 30 MIN: CPT | Mod: 25

## 2023-07-21 NOTE — ASSESSMENT
[FreeTextEntry1] : 63 year old woman with history of seronegative RA, ostearthritis, chronic neck and  back pain, returns for follow up.  Patient overall doing well, status post cervical fusion in March 2023, with improvement in symptoms following surgery.  Patient with increasing hand and left wrist pain over the past week, continues hydroxychloroquine 200 mg twice daily and sulfasalazine 500 mg twice daily.  Will update labs today, previous Vectra of 16, will repeat today.  Will check CBC, chemistry, ESR and CRP in office.  Continues to follow-up with pain management as well as spine.  Further management pending lab results.  Ophthalmology up-to-date for Plaquenil monitoring.  Patient will make appointment with GI as well.  Patient will follow-up in 3 to 4 months or sooner as needed.\par \par \par

## 2023-07-21 NOTE — PHYSICAL EXAM
[General Appearance - Alert] : alert [General Appearance - In No Acute Distress] : in no acute distress [General Appearance - Well-Appearing] : healthy appearing [Sclera] : the sclera and conjunctiva were normal [Respiration, Rhythm And Depth] : normal respiratory rhythm and effort [Exaggerated Use Of Accessory Muscles For Inspiration] : no accessory muscle use [Abnormal Walk] : normal gait [] : no rash [Oriented To Time, Place, And Person] : oriented to person, place, and time [Impaired Insight] : insight and judgment were intact [Affect] : the affect was normal [Mood] : the mood was normal [FreeTextEntry1] : Decreased ROM of the bilateral shoulders. decreased hand  bilaterally.  MCP subluxation left more than right.  Tenderness over the left lateral wrist.

## 2023-07-21 NOTE — HISTORY OF PRESENT ILLNESS
[FreeTextEntry1] : July 21, 2023\par Patient returns for follow up\par Since last visit, patient s/p ACDF C4-C6 (3/27/23).\par Patient doing well following surgery, pain much improved, still with some paresthesias.\par Patient still feels stomach issues, predominantly constipation in the setting of narcotic use, patient will make appointment with GI for further evaluation\par Patient reports some increase in pain in the left wrist and hand, some shoulder pain as well\par Feels some swelling over the left wrist\par Continues hydroxychloroquine 200 mg twice daily\par Continue sulfasalazine 500 mg twice daily\par No other joints involved at this time.  Chronic back pain follows with pain management\par                                                        \par February 8, 2023\par Patient returns for follow-up\par Since last visit, patient concerned about bruising on the right abdominal wall and right upper thigh\par Patient does not recall injury, but was taking aspirin with NSAIDs, as increased NSAID use due to neck pain\par Having issues with neck, patient was seen by spine surgery, scheduled for cervical spine surgery\par Was seen by ortho surgeon, will have ACDF fusion of C4-C7 in February \par Having the tingling in the hands likely related to the cervical spine\par Stomach is ok\par Has been feeling more shoulder pain as well but feels likely related to neck and muscle spasm\par Patient will have preop testing with primary care doctor next week, will forward lab results to office at that time\par Continues current medications\par \par September 20, 2022\par Patient with worsening back pain\par Last injection for the back pain about two months ago, with minimal benefit\par Not improved with pain medication, takes oxycodone and OxyContin, sleeps with TENS unit as well\par Patient would like to see spine orthopedist for second opinion \par Patient will bring previous records to orthopedist as well\par Shoulder doing well, not bothered her since the surgery\par Sometimes with pain in the hands but not as much as previous, better\par Stomach symptoms better\par Will have repeat colonoscopy  as well\par \par May 16, 2022\par Since last visit, had surgery on the right foot, about two weeks ago, doing well\par Will have follow up Friday to remove the pin \par Feels edema of the foot, but otherwise doing well\par Follow up at [facility], Dr. Abilio Stein\par Will have the other foot completed next week\par Chronic right shoulder pain, Arthroscopic surgery 9/21/2021, has some decrease in ROM but without pain\par No new medicines, had new medication for pain after surgery, but not taking at this time\par Using more gabapentin for the foot pain\par Had blood tests before surgery, reports normal\par Continues daily vitamin D after completing high dose weekly\par Intermittent hand pain, continues plaquenil 200 mg bid and  mg bid\par \par January 26, 2022\par Patient returns for follow up\par Feeling increase in pain recently\par right shoulder and back\par Feels stiffness in the hands in the morning as well\par Took prednisone x 2 doses this week with decrease in pain from 10/10 to 4/10\par will have injections for the right shoulder in March 2022\par Discussed vectra DA as well\par Blood tests with PMD 1/14/2022 vitamin d 8, started on vitamin D 51918 per week\par GI symptoms much improved\par \par October 20, 2021\par Had blood tests completed, annual exam last week with PMD\par Had flu vaccine with PMD\par Scheduled for October 25 for Covid booster\par Had right shoulder early September 2021 at [facility]\par No PT at present following surgery as felt the PT made the pan worse, continues home exercises as tolerated\par No changes in medications since seeing medical doctor except added vitamin d \par Plaquenil 200 mg bid\par Sees pain management, had an epidural which helped a little bit, doesn't last too long\par Walking for exercise, about 20 blocks per day, on good, otherwise about 8-10\par Mammogram scheduled\par \par July 21, 2021\par Had shoulder injection on the right, limited benefit\par Feels pain with ROM and with sleeping on the right side, feels worse at night\par Now taking short course of prednisone with unclear benefit\par Was seen seen in [address] [place] [address] by orthopedist on July 16th for second opinion\par Will start PT and if not improved, will consider surgery\par Had lap band replaced about two weeks ago via laparoscopic procedure at [facility]\par Feeling much better in terms of GI symptoms, able to tolerate food at this time\par \par May 20, 2021\par Pain in the right shoulder, for the past four months\par Feels hard to sleep\par Painful, seeing pain management, lowered dosage of pain medications which feels may also have made the pain worse.\par Feels pain in the spine and the shoulder \par Requesting MRI for further evaluation of the right shoulder.\par Had colonoscopy, three polyps, follow up in 6 months to 1 year\par \par Following closely with GI, worsening GI symptoms lately, delayed gastric emptying of both liquids and solids\par Worsening GERD\par Feels unable to sleep \par Unable to digest pills\par Right hand dominant\par Had shoulder xray 6-7 months ago, was in store when dropped something on the floor, bent down to pick something up and a display fell on the right shoulders, went to urgent care, no fractures at that time\par Was also told something abnormal on EKG and needs to follow with her cardiologist as well. \par \par February 23, 2021\par Seen by new pain management doctor\par Has MRI scheduled for the cervical and lumbar spine\par Had reaction to steroid with last epidural, developed high blood pressure and headaches\par Mostly the neck and left upper extremity\par Patient is not interested in surgery at this time\par Joint pain controlled at this time\par \par December 15, 2020\par Patient returns for follow up. \par Patient feeling well, no joint pains today.  Major concern related to chronic back pain, follows with pain management\par Patient had surgery for hernia repair and tolerated well.\par Patient planning to relocate to [place] as well\par No recent changes in medications\par \par September 9, 2020\par Discussed with patient.  You have chosen to receive care through the use of tele-media.  Telemedia enables health care providers at different locations to provide safe, effective, and convenient care through the use of technology.  Please note this is a billable encounter.  Patient understands that I cannot perform a physical examine and that patient may need to come to the clinic to complete the assessment.  Patient agreed verbally to to understanding the risks of benefits of telemedia as explained.\par \par 60 year old woman with history of RA, seen as follow Up reestablish care.  Patient was previously seen more than six months ago.  Since that time, has been following with pain management.  She continue Plaquenil and sulfasalazine but not regularly.  Had some difficulty with refill of Plaquenil due to use for coronavirus. \par \par Pain in the hands, shoulder pain\par Had accident about 1-2 months,  when something fell on her shoulder. was seen in urgent care, had xray, no fracture noted.  Still with pain in the right shoulder, feels pain with abduction of the right shoulder.  Sometimes hard to sleep, sometimes feels some paresthesias in the hands.  \par Has follow up with pain management Tuesday, will discuss the shoulder pain with doctor.  \par Started taking prednisone every other day which has helped\par Feels stiffness and pain in the hands and shoulders\par Patient waiting for approval for spinal stimulator for pain control. \par \par In past, patient was treated with rituxan and decadron (reaction to iv solumedrol), last dose more than one year ago.\par \par September 9, 2020\par Patient returns for follow up\par Patient feeling well n terms of joint pain and stiffness, however main concern related to low back pain.\par Patient was staying with her daughter's family in [place], was lifting her granddaughter and felt pain in the abdominal and back.  Was seen in emergency room, and needs hernia repair surgery, scheduled next week at [facility].  Had all imaging completed in [place], she jc froward results for review.  She will also have pre op clearance next week with her medical doctor.  Labs competed in [place], not available to me at this time. \par Continues Plaquenil and sulfasalazine, no side effects noted. \par Patient requesting refill for Chantix until able to see PMD next week.\par Patient may be relocating to [place] and requests records to be sent to rheumatology practice there.

## 2023-07-24 LAB
ALBUMIN SERPL ELPH-MCNC: 4.6 G/DL
ALP BLD-CCNC: 132 U/L
ALT SERPL-CCNC: 24 U/L
ANION GAP SERPL CALC-SCNC: 10 MMOL/L
AST SERPL-CCNC: 21 U/L
BILIRUB SERPL-MCNC: 0.2 MG/DL
BUN SERPL-MCNC: 13 MG/DL
CALCIUM SERPL-MCNC: 9.8 MG/DL
CHLORIDE SERPL-SCNC: 102 MMOL/L
CO2 SERPL-SCNC: 29 MMOL/L
CREAT SERPL-MCNC: 0.91 MG/DL
CRP SERPL-MCNC: <3 MG/L
EGFR: 70 ML/MIN/1.73M2
ERYTHROCYTE [SEDIMENTATION RATE] IN BLOOD BY WESTERGREN METHOD: 8 MM/HR
GLUCOSE SERPL-MCNC: 93 MG/DL
POTASSIUM SERPL-SCNC: 4.4 MMOL/L
PROT SERPL-MCNC: 7.4 G/DL
SODIUM SERPL-SCNC: 140 MMOL/L

## 2023-07-31 LAB
AA PROT SER-MCNC: 6.5 UG/ML
BIOMARKER COMMENT: NORMAL
CRP SERPL-MCNC: 2.9 MG/L
EGF SERPL-MCNC: 290 PG/ML
FOOTNOTE: NORMAL
IL6 SERPL-MCNC: 6.1 PG/ML
LEPTIN SERPL-MCNC: 37 NG/ML
Lab: NORMAL
Lab: NORMAL
MMP-1 SERPL-MCNC: 8.7 NG/ML
MMP-3 SERPL-MCNC: 19 NG/ML
RA DAS LEVEL QL VECTRADA: NORMAL
RESISTIN SERPL-MCNC: 4.6 NG/ML
RISK OF RADIOGRAPHIC PROGRESS: 4 %
TNFRSF1A SERPL-MCNC: 1.3 NG/ML
VAP-1 SERPL-MCNC: 0.6 UG/ML
VECTRA SCORE: 33
VEGF-A SERPL-MCNC: 420 PG/ML
YKL-40 RESULT: 100 NG/ML

## 2023-08-20 PROBLEM — G95.20 CERVICAL CORD COMPRESSION WITH MYELOPATHY: Status: ACTIVE | Noted: 2023-02-01

## 2023-08-20 PROBLEM — M54.12 CERVICAL RADICULOPATHY: Status: ACTIVE | Noted: 2023-02-01

## 2023-08-20 NOTE — PHYSICAL EXAM
[de-identified] : General: No acute distress, conversant, well-nourished. Head: Normocephalic, atraumatic Neck: trachea midline, FROM Heart: normotensive and normal rate and rhythm Lungs: No labored breathing Skin: No abrasions, no rashes, no edema Psych: Alert and oriented to person, place and time Extremities: no peripheral edema or digital cyanosis Gait: Normal gait. Can perform tandem gait.   Vascular: warm and well perfused distally, palpable distal pulses MSK: Spine:  incision well healed  NEURO: Sensation           Left            C5     2/2                C6     2/2                C7     2/2                C8     2/2               T1     2/2                        Right          C5     2/2                C6     2/2                C7     2/2                C8     2/2               T1     2/2        Motor:                                                 Left              C5 (deltoid abduction)             5/5                C6 (biceps flexion)                   5/5                 C7 (triceps extension)             5/5                C8 (finger flexion)                     5/5                T1 (interosseous)                     5/5                                                            Right            C5 (deltoid abduction)             5/5                C6 (biceps flexion)                   5/5                 C7 (triceps extension)             5/5                C8 (finger flexion)                     5/5                T1 (interosseous)                     5/5                       Sensation  Left L2  -  2/2             Left L3  -  2/2 Left L4  -  2/2 Left L5  -  2/2 Left S1  -  2/2  Right L2  -  2/2             Right L3  -  2/2 Right L4  -  2/2 Right L5  -  2/2 Right S1  -  2/2  Motor:  Left L2 (hip flexion)                            5/5                 Left L3 (knee extension)                   5/5                 Left L4 (ankle dorsiflexion)                 5/5                 Left L5 (long toe extensor)                5/5                 Left S1 (ankle plantar flexion)           5/5  Right L2 (hip flexion)                            5/5                 Right L3 (knee extension)                   5/5                 Right L4 (ankle dorsiflexion)                 5/5                 Right L5 (long toe extensor)                5/5                 Right S1 (ankle plantar flexion)           5/5  Reflexes: Normal and symmetric Negative Spurlings test.  Negative Hoffmans reflex.   Negative clonus.  Down-going Babinski [de-identified] : I ordered radiographs to evaluate the patient's symptoms.  Cervical 2 view radiographs taken in the office today show no dislocation or fracture. Cervical spondylosis. s/p ACDF C4-C6.

## 2023-08-20 NOTE — PHYSICAL EXAM
[de-identified] : General: No acute distress, conversant, well-nourished. Head: Normocephalic, atraumatic Neck: trachea midline, FROM Heart: normotensive and normal rate and rhythm Lungs: No labored breathing Skin: No abrasions, no rashes, no edema Psych: Alert and oriented to person, place and time Extremities: no peripheral edema or digital cyanosis Gait: Normal gait. Can perform tandem gait.   Vascular: warm and well perfused distally, palpable distal pulses MSK: Spine:  incision well healed  NEURO: Sensation           Left            C5     2/2                C6     2/2                C7     2/2                C8     2/2               T1     2/2                        Right          C5     2/2                C6     2/2                C7     2/2                C8     2/2               T1     2/2        Motor:                                                 Left              C5 (deltoid abduction)             5/5                C6 (biceps flexion)                   5/5                 C7 (triceps extension)             5/5                C8 (finger flexion)                     5/5                T1 (interosseous)                     5/5                                                            Right            C5 (deltoid abduction)             5/5                C6 (biceps flexion)                   5/5                 C7 (triceps extension)             5/5                C8 (finger flexion)                     5/5                T1 (interosseous)                     5/5                       Sensation  Left L2  -  2/2             Left L3  -  2/2 Left L4  -  2/2 Left L5  -  2/2 Left S1  -  2/2  Right L2  -  2/2             Right L3  -  2/2 Right L4  -  2/2 Right L5  -  2/2 Right S1  -  2/2  Motor:  Left L2 (hip flexion)                            5/5                 Left L3 (knee extension)                   5/5                 Left L4 (ankle dorsiflexion)                 5/5                 Left L5 (long toe extensor)                5/5                 Left S1 (ankle plantar flexion)           5/5  Right L2 (hip flexion)                            5/5                 Right L3 (knee extension)                   5/5                 Right L4 (ankle dorsiflexion)                 5/5                 Right L5 (long toe extensor)                5/5                 Right S1 (ankle plantar flexion)           5/5  Reflexes: Normal and symmetric Negative Spurlings test.  Negative Hoffmans reflex.   Negative clonus.  Down-going Babinski [de-identified] : I ordered radiographs to evaluate the patient's symptoms.  Cervical 2 view radiographs taken in the office today show no dislocation or fracture. Cervical spondylosis. s/p ACDF C4-C6.

## 2023-08-20 NOTE — HISTORY OF PRESENT ILLNESS
[3] : the patient reports pain that is 3/10 in severity [Chills] : no chills [Constipation] : no constipation [Diarrhea] : no diarrhea [Dysuria] : no dysuria [Fever] : no fever [Nausea] : no nausea [Vomiting] : no vomiting [de-identified] : 63 year old female s/p ACDF C4-C6 (3/27/23). She has minimal neck discomfort.  She has no radicular pain in her upper extremities and reports improved numbness and strength.  Her chronic low back pain has worsened.  She has a history of two previous lumbar spine surgeries the most recent which was done by Dr. Park at [facility]. Since the surgeries her low back pain has worsened.  She denies recent illness, fevers, balance problems, saddle anesthesia, urinary retention or fecal incontinence. She is followed by Dr. Jeannie Bustos and Dr. David Molina from pain management.

## 2023-08-20 NOTE — ASSESSMENT
[FreeTextEntry1] : 63 year old female s/p ACDF C4-C6 (3/27/23). She has minimal neck discomfort.  She has no radicular pain in her upper extremities and reports improved numbness and strength.  Her chronic low back pain has worsened. She is otherwise neurologically intact.  She has a history of two previous lumbar spine surgeries the most recent which was done by Dr. Park at [facility]. She will be sent for a lumbar MRI w/ and w/o contrast.  She will be sent for a lumbar CT. She can continue PT. She can continue oxycodone per pain management. She will followup after her imaging.  We discussed red flag symptoms that would require emergent evaluation. She knows to call with any questions or concerns or if her symptoms acutely worsen.

## 2023-08-20 NOTE — ASSESSMENT
[FreeTextEntry1] : 63 year old female s/p ACDF C4-C6 (3/27/23). She has minimal neck discomfort.  She has no radicular pain in her upper extremities and reports improved numbness and strength.  Her chronic low back pain has worsened. She is otherwise neurologically intact.  She has a history of two previous lumbar spine surgeries the most recent which was done by Dr. Park at [facility]. She will be sent for a lumbar MRI w/ and w/o contrast.  She will be sent for a lumbar CT. She can continue PT. She can continue oxycodone per pain management. She will followup after her imaging.  We discussed red flag symptoms that would require emergent evaluation. She knows to call with any questions or concerns or if her symptoms acutely worsen.

## 2023-08-20 NOTE — HISTORY OF PRESENT ILLNESS
[3] : the patient reports pain that is 3/10 in severity [Chills] : no chills [Constipation] : no constipation [Diarrhea] : no diarrhea [Dysuria] : no dysuria [Fever] : no fever [Nausea] : no nausea [Vomiting] : no vomiting [de-identified] : 63 year old female s/p ACDF C4-C6 (3/27/23). She has minimal neck discomfort.  She has no radicular pain in her upper extremities and reports improved numbness and strength.  Her chronic low back pain has worsened.  She has a history of two previous lumbar spine surgeries the most recent which was done by Dr. Park at [facility]. Since the surgeries her low back pain has worsened.  She denies recent illness, fevers, balance problems, saddle anesthesia, urinary retention or fecal incontinence. She is followed by Dr. Jeannie Bustos and Dr. David Molina from pain management.

## 2023-08-24 ENCOUNTER — APPOINTMENT (OUTPATIENT)
Dept: ORTHOPEDIC SURGERY | Facility: CLINIC | Age: 63
End: 2023-08-24
Payer: MEDICARE

## 2023-08-24 DIAGNOSIS — M48.062 SPINAL STENOSIS, LUMBAR REGION WITH NEUROGENIC CLAUDICATION: ICD-10-CM

## 2023-08-24 PROCEDURE — 99214 OFFICE O/P EST MOD 30 MIN: CPT

## 2023-08-24 NOTE — PHYSICAL EXAM
[de-identified] : General: No acute distress, conversant, well-nourished. Head: Normocephalic, atraumatic Neck: trachea midline, FROM Heart: normotensive and normal rate and rhythm Lungs: No labored breathing Skin: No abrasions, no rashes, no edema Psych: Alert and oriented to person, place and time Extremities: no peripheral edema or digital cyanosis Gait: Normal gait. Can perform tandem gait.   Vascular: warm and well perfused distally, palpable distal pulses MSK: Spine:  incision well healed  NEURO: Sensation           Left            C5     2/2                C6     2/2                C7     2/2                C8     2/2               T1     2/2                        Right          C5     2/2                C6     2/2                C7     2/2                C8     2/2               T1     2/2        Motor:                                                 Left              C5 (deltoid abduction)             5/5                C6 (biceps flexion)                   5/5                 C7 (triceps extension)             5/5                C8 (finger flexion)                     5/5                T1 (interosseous)                     5/5                                                            Right            C5 (deltoid abduction)             5/5                C6 (biceps flexion)                   5/5                 C7 (triceps extension)             5/5                C8 (finger flexion)                     5/5                T1 (interosseous)                     5/5                       Sensation  Left L2  -  2/2             Left L3  -  2/2 Left L4  -  2/2 Left L5  -  2/2 Left S1  -  2/2  Right L2  -  2/2             Right L3  -  2/2 Right L4  -  2/2 Right L5  -  2/2 Right S1  -  2/2  Motor:  Left L2 (hip flexion)                            5/5                 Left L3 (knee extension)                   5/5                 Left L4 (ankle dorsiflexion)                 5/5                 Left L5 (long toe extensor)                5/5                 Left S1 (ankle plantar flexion)           5/5  Right L2 (hip flexion)                            5/5                 Right L3 (knee extension)                   5/5                 Right L4 (ankle dorsiflexion)                 5/5                 Right L5 (long toe extensor)                5/5                 Right S1 (ankle plantar flexion)           5/5  Reflexes: Normal and symmetric Negative Spurlings test.  Negative Hoffmans reflex.   Negative clonus.  Down-going Babinski [de-identified] :  MRI of the lumbar spine demonstrates:  Redemonstration of postoperative changes related to L3-S1 posterior instrumented fusion, L4 and S1 ventral approach screws, and L3-4, L4-5, and L5-S1 interbody fusion devices.   Redemonstration of multilevel degenerative changes of the lumbar spine, overall not substantially changed compared to previous MRI of the lumbar spine performed on 1/26/2023, as detailed.  CT LUMBAR SPINE WITHOUT CONTRAST Redemonstration of postoperative changes related to L3-S1 posterior instrumented fusion, L4-5 laminectomy, ventral approach vertebral body screws at L4 and S1, and L3-4, L4-5, and L5-S1 interbody fusion devices.   Multilevel degenerative changes contributing to moderate to severe spinal canal stenosis at L2-3, moderate at T10-11, mild to moderate at T12-L1 and L1-2, and mild at T11-12.  Moderate multilevel foraminal stenoses, most severe on the right at L2-3.  Grade 1 retrolisthesis T12-L1 and L1-2. Cervical 2 view radiographs no dislocation or fracture. Cervical spondylosis. s/p ACDF C4-C6.

## 2023-08-24 NOTE — ASSESSMENT
[FreeTextEntry1] : 63 year old female s/p ACDF C4-C6 (3/27/23) followup for acute exacerbation of chronic low back pain. The low back pain radiates to her legs.  She is limted in the distance she can walk.  She has a history of two previous lumbar spine surgeries the most recent which was done by Dr. Park at [facility]. Since the surgeries her low back pain has worsened.  She is otherwise neurologically intact. We reviewed her lumbar MRI and CT.  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  We discussed treatment options including physical therapy, medications, spinal injections and surgery.  She has tried extensive conservative treatment without improvement. She is interested in surgery. Surgery would be L2-L3 depression with extension of instrumented fusion to L2.  We discussed the risks and benefits of surgery. The risks include anesthesia, blood loss, need for blood transfusions, clots, stroke, myocardial infarct, chronic pain, loss of function, need for reoperation, nonunion, hardware failure, durotomy, infection and damage to neurovascular structures. She understands the risks. She understands the risks are increased since it is a revision surgery. She asked several great questions all of which were answered. She will be scheduled for surgery. We discussed red flag symptoms that would require emergent evaluation. She knows to call with any questions or concerns or if her symptoms acutely worsen

## 2023-08-24 NOTE — HISTORY OF PRESENT ILLNESS
[de-identified] : 63 year old female s/p ACDF C4-C6 (3/27/23) followup for acute exacerbation of chronic low back pain. The low back pain radiates to her legs.  She is limted in the distance she can walk.  She has a history of two previous lumbar spine surgeries the most recent which was done by Dr. Park at [facility]. Since the surgeries her low back pain has worsened.  She denies recent illness, fevers, balance problems, saddle anesthesia, urinary retention or fecal incontinence. She is followed by Dr. Jeannie Bustos and Dr. David Molina from pain management.  She is here to review her recent lumbar CT and MRI.

## 2023-08-31 ENCOUNTER — NON-APPOINTMENT (OUTPATIENT)
Age: 63
End: 2023-08-31

## 2023-09-02 ENCOUNTER — NON-APPOINTMENT (OUTPATIENT)
Age: 63
End: 2023-09-02

## 2023-09-18 ENCOUNTER — NON-APPOINTMENT (OUTPATIENT)
Age: 63
End: 2023-09-18

## 2023-09-21 ENCOUNTER — NON-APPOINTMENT (OUTPATIENT)
Age: 63
End: 2023-09-21

## 2023-09-22 ENCOUNTER — TRANSCRIPTION ENCOUNTER (OUTPATIENT)
Age: 63
End: 2023-09-22

## 2023-09-28 ENCOUNTER — NON-APPOINTMENT (OUTPATIENT)
Age: 63
End: 2023-09-28

## 2023-10-06 VITALS
SYSTOLIC BLOOD PRESSURE: 143 MMHG | TEMPERATURE: 98 F | RESPIRATION RATE: 18 BRPM | WEIGHT: 163.8 LBS | HEIGHT: 60 IN | DIASTOLIC BLOOD PRESSURE: 92 MMHG | OXYGEN SATURATION: 95 % | HEART RATE: 75 BPM

## 2023-10-06 RX ORDER — POVIDONE-IODINE 5 %
1 AEROSOL (ML) TOPICAL ONCE
Refills: 0 | Status: COMPLETED | OUTPATIENT
Start: 2023-10-09 | End: 2023-10-09

## 2023-10-06 RX ORDER — CHLORHEXIDINE GLUCONATE 213 G/1000ML
1 SOLUTION TOPICAL EVERY 12 HOURS
Refills: 0 | Status: DISCONTINUED | OUTPATIENT
Start: 2023-10-09 | End: 2023-10-09

## 2023-10-06 NOTE — ASU PATIENT PROFILE, ADULT - NSICDXPASTSURGICALHX_GEN_ALL_CORE_FT
PAST SURGICAL HISTORY:  Gall stones     H/O carpal tunnel repair B/L hand    H/O  section x2    H/O hernia repair     H/O right knee surgery     H/O tubal ligation     History of appendectomy     History of arthroscopy of right shoulder     History of bunionectomy and reconstruction    History of cholecystectomy     History of lumbosacral spine surgery x2    History of tonsillectomy      yes

## 2023-10-06 NOTE — H&P ADULT - HISTORY OF PRESENT ILLNESS
63F c/o back pain x   Pt held ozempic ___ days preop  Present for revision L2-S1 decompression with extension of instrumented fusion to L2  The patient is a 63 year old female who presents for revision L2-S1 decompression with extension of instrumented fusion to L2. Per Dr. Elias's outpatient note 8/23: she has low back pain that radiates to her legs and is limited in the distance she can walk due to pain; she has a history of two previous lumbar spine surgeries the most recent which was done by Dr. Park at [facility].     The patient is a 63 year old female who presents for revision L2-S1 decompression with extension of instrumented fusion to L2. She has back pain despite conservative therapies for her symptoms. States has numbness to left thigh. Ambulates with cane or walker. States she sees a pain management physician, typically takes oxycodone 30mg three times daily.  Per Dr. Elias's outpatient note 8/23: she has low back pain that radiates to her legs and is limited in the distance she can walk due to pain; she has a history of two previous lumbar spine surgeries the most recent which was done by Dr. Park at [facility].

## 2023-10-06 NOTE — H&P ADULT - NSHPPHYSICALEXAM_GEN_ALL_CORE
MSK: + decreased ROM 2/2 pain, lumbosacral spine       Remainder of exam per medical clearance note MSK: Skin warm and well perfused. DP pulses palpable bilateral lower extremities. Sensation intact and equal bilateral lower extremities. EHL/TA 5/5 bilateral lower extremities, GS/FHL firing bilateral lower extremities, initially with some guarding on exam, when encouraged to fight through pain, 5/5 bilateral lower extremities. + decreased ROM 2/2 pain, lumbosacral spine       Remainder of exam per medical clearance note

## 2023-10-06 NOTE — ASU PREOP CHECKLIST - TAMPON REMOVED
Pt cancelled his procedure for 05/14/2019,  He started a new job and he cannot get time off, so he is going to wait until he has the time.   They will give us a call.    n/a

## 2023-10-06 NOTE — H&P ADULT - NSHPLABSRESULTS_GEN_ALL_CORE
Preop CBC, BMP,  UA (trace leuks) - WNL per medical clearance   Cr .89  PT/INR/PTT DOS   Preop EKG - NSR _  WNL per medical clearance   NST 10/3 EF 50%   3M DOS Preop CBC, BMP,  UA (trace leuks) - WNL per medical clearance   Cr .89  PT/INR/PTT DOS   Preop EKG - NSR   WNL per medical clearance   NST 10/3 EF 50%   Povidone iodine nasal swab to be given day of surgery

## 2023-10-06 NOTE — ASU PATIENT PROFILE, ADULT - FALL HARM RISK - HARM RISK INTERVENTIONS
Communicate Risk of Fall with Harm to all staff/Reinforce activity limits and safety measures with patient and family/Tailored Fall Risk Interventions/Visual Cue: Yellow wristband and red socks/Bed in lowest position, wheels locked, appropriate side rails in place/Call bell, personal items and telephone in reach/Instruct patient to call for assistance before getting out of bed or chair/Non-slip footwear when patient is out of bed/[place] to call system/Physically safe environment - no spills, clutter or unnecessary equipment/Purposeful Proactive Rounding/Room/bathroom lighting operational, light cord in reach Assistance with ambulation/Assistance OOB with selected safe patient handling equipment/Communicate Risk of Fall with Harm to all staff/Discuss with provider need for PT consult/Monitor gait and stability/Provide patient with walking aids - walker, cane, crutches/Reinforce activity limits and safety measures with patient and family/Tailored Fall Risk Interventions/Visual Cue: Yellow wristband and red socks/Bed in lowest position, wheels locked, appropriate side rails in place/Call bell, personal items and telephone in reach/Instruct patient to call for assistance before getting out of bed or chair/Non-slip footwear when patient is out of bed/[place] to call system/Physically safe environment - no spills, clutter or unnecessary equipment/Purposeful Proactive Rounding/Room/bathroom lighting operational, light cord in reach

## 2023-10-06 NOTE — H&P ADULT - PROBLEM SELECTOR PLAN 1
Admit to Orthopaedic Service.  Presents today for elective Revision L2-S1 Decompression with extension of fusion to L2   Pt medically stable and cleared for procedure today by Dr. Pal

## 2023-10-06 NOTE — H&P ADULT - NSICDXPASTMEDICALHX_GEN_ALL_CORE_FT
PAST MEDICAL HISTORY:  Arthritis     Back pain     HLD (hyperlipidemia)     HTN (hypertension)     Obstructive sleep apnea     Systemic lupus

## 2023-10-08 ENCOUNTER — TRANSCRIPTION ENCOUNTER (OUTPATIENT)
Age: 63
End: 2023-10-08

## 2023-10-09 ENCOUNTER — APPOINTMENT (OUTPATIENT)
Dept: ORTHOPEDIC SURGERY | Facility: HOSPITAL | Age: 63
End: 2023-10-09

## 2023-10-09 ENCOUNTER — INPATIENT (INPATIENT)
Facility: HOSPITAL | Age: 63
LOS: 3 days | Discharge: NURSING, REHAB OR LONG-TERM CARE | DRG: 460 | End: 2023-10-13
Attending: STUDENT IN AN ORGANIZED HEALTH CARE EDUCATION/TRAINING PROGRAM | Admitting: STUDENT IN AN ORGANIZED HEALTH CARE EDUCATION/TRAINING PROGRAM
Payer: MEDICARE

## 2023-10-09 ENCOUNTER — TRANSCRIPTION ENCOUNTER (OUTPATIENT)
Age: 63
End: 2023-10-09

## 2023-10-09 DIAGNOSIS — Z98.890 OTHER SPECIFIED POSTPROCEDURAL STATES: Chronic | ICD-10-CM

## 2023-10-09 DIAGNOSIS — Z90.49 ACQUIRED ABSENCE OF OTHER SPECIFIED PARTS OF DIGESTIVE TRACT: Chronic | ICD-10-CM

## 2023-10-09 DIAGNOSIS — Z98.891 HISTORY OF UTERINE SCAR FROM PREVIOUS SURGERY: Chronic | ICD-10-CM

## 2023-10-09 DIAGNOSIS — K80.20 CALCULUS OF GALLBLADDER WITHOUT CHOLECYSTITIS WITHOUT OBSTRUCTION: Chronic | ICD-10-CM

## 2023-10-09 DIAGNOSIS — I10 ESSENTIAL (PRIMARY) HYPERTENSION: ICD-10-CM

## 2023-10-09 DIAGNOSIS — M54.9 DORSALGIA, UNSPECIFIED: ICD-10-CM

## 2023-10-09 DIAGNOSIS — Z90.89 ACQUIRED ABSENCE OF OTHER ORGANS: Chronic | ICD-10-CM

## 2023-10-09 DIAGNOSIS — Z98.51 TUBAL LIGATION STATUS: Chronic | ICD-10-CM

## 2023-10-09 DIAGNOSIS — M32.9 SYSTEMIC LUPUS ERYTHEMATOSUS, UNSPECIFIED: ICD-10-CM

## 2023-10-09 DIAGNOSIS — G47.33 OBSTRUCTIVE SLEEP APNEA (ADULT) (PEDIATRIC): ICD-10-CM

## 2023-10-09 DIAGNOSIS — E78.5 HYPERLIPIDEMIA, UNSPECIFIED: ICD-10-CM

## 2023-10-09 LAB
APTT BLD: 34.5 SEC — SIGNIFICANT CHANGE UP (ref 24.5–35.6)
BLD GP AB SCN SERPL QL: NEGATIVE — SIGNIFICANT CHANGE UP
INR BLD: 0.98 — SIGNIFICANT CHANGE UP (ref 0.85–1.18)
PROTHROM AB SERPL-ACNC: 11.2 SEC — SIGNIFICANT CHANGE UP (ref 9.5–13)
RH IG SCN BLD-IMP: POSITIVE — SIGNIFICANT CHANGE UP

## 2023-10-09 PROCEDURE — 22612 ARTHRD PST TQ 1NTRSPC LUMBAR: CPT

## 2023-10-09 PROCEDURE — 63047 LAM FACETEC & FORAMOT LUMBAR: CPT

## 2023-10-09 PROCEDURE — 20939 BONE MARROW ASPIR BONE GRFG: CPT

## 2023-10-09 PROCEDURE — 22830 EXPLORATION OF SPINAL FUSION: CPT | Mod: 59

## 2023-10-09 PROCEDURE — 22840 INSERT SPINE FIXATION DEVICE: CPT

## 2023-10-09 PROCEDURE — 61783 SCAN PROC SPINAL: CPT | Mod: 59

## 2023-10-09 DEVICE — IMPLANTABLE DEVICE: Type: IMPLANTABLE DEVICE | Status: FUNCTIONAL

## 2023-10-09 DEVICE — SURGIFLO HEMOSTATIC MATRIX KIT: Type: IMPLANTABLE DEVICE | Status: FUNCTIONAL

## 2023-10-09 DEVICE — ROD PRE-LORDOSED 5.5X45MM: Type: IMPLANTABLE DEVICE | Status: FUNCTIONAL

## 2023-10-09 DEVICE — SURGIFOAM PAD 8CM X 12.5CM X 10MM (100): Type: IMPLANTABLE DEVICE | Status: FUNCTIONAL

## 2023-10-09 DEVICE — SET SCREW NXG: Type: IMPLANTABLE DEVICE | Status: FUNCTIONAL

## 2023-10-09 DEVICE — HEAD POLY BODY TOP LOAD: Type: IMPLANTABLE DEVICE | Status: FUNCTIONAL

## 2023-10-09 RX ORDER — ACETAMINOPHEN 500 MG
975 TABLET ORAL EVERY 8 HOURS
Refills: 0 | Status: DISCONTINUED | OUTPATIENT
Start: 2023-10-09 | End: 2023-10-13

## 2023-10-09 RX ORDER — NALOXONE HYDROCHLORIDE 4 MG/.1ML
0.1 SPRAY NASAL
Refills: 0 | Status: DISCONTINUED | OUTPATIENT
Start: 2023-10-09 | End: 2023-10-13

## 2023-10-09 RX ORDER — OXYCODONE HYDROCHLORIDE 5 MG/1
5 TABLET ORAL EVERY 4 HOURS
Refills: 0 | Status: DISCONTINUED | OUTPATIENT
Start: 2023-10-09 | End: 2023-10-10

## 2023-10-09 RX ORDER — HYDROMORPHONE HYDROCHLORIDE 2 MG/ML
1 INJECTION INTRAMUSCULAR; INTRAVENOUS; SUBCUTANEOUS
Refills: 0 | Status: DISCONTINUED | OUTPATIENT
Start: 2023-10-09 | End: 2023-10-10

## 2023-10-09 RX ORDER — DEXAMETHASONE 0.5 MG/5ML
6 ELIXIR ORAL EVERY 6 HOURS
Refills: 0 | Status: COMPLETED | OUTPATIENT
Start: 2023-10-09 | End: 2023-10-10

## 2023-10-09 RX ORDER — APREPITANT 80 MG/1
40 CAPSULE ORAL ONCE
Refills: 0 | Status: COMPLETED | OUTPATIENT
Start: 2023-10-09 | End: 2023-10-09

## 2023-10-09 RX ORDER — METHOCARBAMOL 500 MG/1
500 TABLET, FILM COATED ORAL EVERY 8 HOURS
Refills: 0 | Status: DISCONTINUED | OUTPATIENT
Start: 2023-10-09 | End: 2023-10-12

## 2023-10-09 RX ORDER — OXYCODONE HYDROCHLORIDE 5 MG/1
30 TABLET ORAL EVERY 8 HOURS
Refills: 0 | Status: DISCONTINUED | OUTPATIENT
Start: 2023-10-09 | End: 2023-10-09

## 2023-10-09 RX ORDER — HYDROMORPHONE HYDROCHLORIDE 2 MG/ML
30 INJECTION INTRAMUSCULAR; INTRAVENOUS; SUBCUTANEOUS
Refills: 0 | Status: DISCONTINUED | OUTPATIENT
Start: 2023-10-09 | End: 2023-10-10

## 2023-10-09 RX ORDER — ONDANSETRON 8 MG/1
4 TABLET, FILM COATED ORAL EVERY 6 HOURS
Refills: 0 | Status: DISCONTINUED | OUTPATIENT
Start: 2023-10-09 | End: 2023-10-13

## 2023-10-09 RX ORDER — ATORVASTATIN CALCIUM 80 MG/1
40 TABLET, FILM COATED ORAL AT BEDTIME
Refills: 0 | Status: DISCONTINUED | OUTPATIENT
Start: 2023-10-09 | End: 2023-10-13

## 2023-10-09 RX ORDER — CEFAZOLIN SODIUM 1 G
2000 VIAL (EA) INJECTION EVERY 8 HOURS
Refills: 0 | Status: COMPLETED | OUTPATIENT
Start: 2023-10-10 | End: 2023-10-10

## 2023-10-09 RX ORDER — ACETAMINOPHEN 500 MG
1000 TABLET ORAL ONCE
Refills: 0 | Status: COMPLETED | OUTPATIENT
Start: 2023-10-09 | End: 2023-10-09

## 2023-10-09 RX ORDER — OXYCODONE HYDROCHLORIDE 5 MG/1
0 TABLET ORAL
Refills: 0 | DISCHARGE

## 2023-10-09 RX ORDER — ENOXAPARIN SODIUM 100 MG/ML
40 INJECTION SUBCUTANEOUS EVERY 24 HOURS
Refills: 0 | Status: DISCONTINUED | OUTPATIENT
Start: 2023-10-10 | End: 2023-10-13

## 2023-10-09 RX ORDER — HYDROMORPHONE HYDROCHLORIDE 2 MG/ML
0.5 INJECTION INTRAMUSCULAR; INTRAVENOUS; SUBCUTANEOUS
Refills: 0 | Status: DISCONTINUED | OUTPATIENT
Start: 2023-10-09 | End: 2023-10-09

## 2023-10-09 RX ADMIN — Medication 1 APPLICATION(S): at 12:13

## 2023-10-09 RX ADMIN — HYDROMORPHONE HYDROCHLORIDE 1 MILLIGRAM(S): 2 INJECTION INTRAMUSCULAR; INTRAVENOUS; SUBCUTANEOUS at 23:42

## 2023-10-09 RX ADMIN — CHLORHEXIDINE GLUCONATE 1 APPLICATION(S): 213 SOLUTION TOPICAL at 12:13

## 2023-10-09 RX ADMIN — APREPITANT 40 MILLIGRAM(S): 80 CAPSULE ORAL at 11:57

## 2023-10-09 RX ADMIN — HYDROMORPHONE HYDROCHLORIDE 1 MILLIGRAM(S): 2 INJECTION INTRAMUSCULAR; INTRAVENOUS; SUBCUTANEOUS at 22:42

## 2023-10-09 RX ADMIN — ATORVASTATIN CALCIUM 40 MILLIGRAM(S): 80 TABLET, FILM COATED ORAL at 21:12

## 2023-10-09 RX ADMIN — METHOCARBAMOL 500 MILLIGRAM(S): 500 TABLET, FILM COATED ORAL at 21:12

## 2023-10-09 RX ADMIN — Medication 50 MILLIGRAM(S): at 21:12

## 2023-10-09 RX ADMIN — HYDROMORPHONE HYDROCHLORIDE 0.5 MILLIGRAM(S): 2 INJECTION INTRAMUSCULAR; INTRAVENOUS; SUBCUTANEOUS at 20:15

## 2023-10-09 RX ADMIN — HYDROMORPHONE HYDROCHLORIDE 0.5 MILLIGRAM(S): 2 INJECTION INTRAMUSCULAR; INTRAVENOUS; SUBCUTANEOUS at 20:30

## 2023-10-09 RX ADMIN — Medication 1000 MILLIGRAM(S): at 11:57

## 2023-10-09 RX ADMIN — HYDROMORPHONE HYDROCHLORIDE 30 MILLILITER(S): 2 INJECTION INTRAMUSCULAR; INTRAVENOUS; SUBCUTANEOUS at 20:38

## 2023-10-09 NOTE — PROGRESS NOTE ADULT - SUBJECTIVE AND OBJECTIVE BOX
Ortho Post Op Check    Procedure: Revision L2-5 decompression with extension of instrumentation fusion to L2  Surgeon: Curt    Pt comfortable without complaints, pain controlled  Denies CP, SOB, N/V, numbness/tingling     Vital Signs Last 24 Hrs  T(C): 36.2 (10-09-23 @ 20:05), Max: 36.2 (10-09-23 @ 20:05)  T(F): 97.2 (10-09-23 @ 20:05), Max: 97.2 (10-09-23 @ 20:05)  HR: 59 (10-09-23 @ 20:30) (59 - 64)  BP: 135/77 (10-09-23 @ 20:30) (124/73 - 137/84)  BP(mean): 100 (10-09-23 @ 20:30) (94 - 106)  RR: 14 (10-09-23 @ 20:30) (14 - 19)  SpO2: 100% (10-09-23 @ 20:30) (100% - 100%)  I&O's Summary    09 Oct 2023 07:01  -  09 Oct 2023 21:02  --------------------------------------------------------  IN: 0 mL / OUT: 622.5 mL / NET: -622.5 mL        General: Pt Alert and oriented, NAD  Prineo, telfa, tegadermDSG C/D/I  Pulses: 2+  Sensation: SILT  Motor: 5/5 EHL/FHL/TA/GS          A/P: 63yFemale POD#0 s/p Revision L2-5 decompression with extension of instrumentation fusion to L2  - Stable  - Pain Control  -1x HV  - DVT ppx: Lovenox POD1  - Post op abx: Ancef  - PT, WBS: WBAT    Ortho Pager [phone]

## 2023-10-09 NOTE — PRE-ANESTHESIA EVALUATION ADULT - NSDENTALSD_ENT_ALL_CORE
Kanchan Davenport, provider out of office this afternoon. Writer will discuss with her tomorrow , and call back on Tuesday. appears normal and intact

## 2023-10-09 NOTE — PRE-ANESTHESIA EVALUATION ADULT - NSANTHPEFT_GEN_ALL_CORE
General: AAOx3, NAD  Eyes: The sclera and conjunctiva normal, pupils equal in size.  ENT: The ears and nose were normal in appearance; oropharynx clear, moist mucus membranes.  Neck: The appearance of the neck was normal, with no gross masses or nodules.  Respiratory: Unlabored, no retractions.  CV: RRR  Neurological: No focal deficit, moves all extremities.  Exercise Tolerance:  limited by pain

## 2023-10-10 LAB
ANION GAP SERPL CALC-SCNC: 8 MMOL/L — SIGNIFICANT CHANGE UP (ref 5–17)
BASOPHILS # BLD AUTO: 0.02 K/UL — SIGNIFICANT CHANGE UP (ref 0–0.2)
BASOPHILS NFR BLD AUTO: 0.2 % — SIGNIFICANT CHANGE UP (ref 0–2)
BUN SERPL-MCNC: 14 MG/DL — SIGNIFICANT CHANGE UP (ref 7–23)
CALCIUM SERPL-MCNC: 9 MG/DL — SIGNIFICANT CHANGE UP (ref 8.4–10.5)
CHLORIDE SERPL-SCNC: 104 MMOL/L — SIGNIFICANT CHANGE UP (ref 96–108)
CO2 SERPL-SCNC: 26 MMOL/L — SIGNIFICANT CHANGE UP (ref 22–31)
CREAT SERPL-MCNC: 0.75 MG/DL — SIGNIFICANT CHANGE UP (ref 0.5–1.3)
EGFR: 89 ML/MIN/1.73M2 — SIGNIFICANT CHANGE UP
EOSINOPHIL # BLD AUTO: 0 K/UL — SIGNIFICANT CHANGE UP (ref 0–0.5)
EOSINOPHIL NFR BLD AUTO: 0 % — SIGNIFICANT CHANGE UP (ref 0–6)
GLUCOSE SERPL-MCNC: 117 MG/DL — HIGH (ref 70–99)
HCT VFR BLD CALC: 34.1 % — LOW (ref 34.5–45)
HGB BLD-MCNC: 10.9 G/DL — LOW (ref 11.5–15.5)
IMM GRANULOCYTES NFR BLD AUTO: 0.6 % — SIGNIFICANT CHANGE UP (ref 0–0.9)
LYMPHOCYTES # BLD AUTO: 1.06 K/UL — SIGNIFICANT CHANGE UP (ref 1–3.3)
LYMPHOCYTES # BLD AUTO: 9.5 % — LOW (ref 13–44)
MCHC RBC-ENTMCNC: 29.9 PG — SIGNIFICANT CHANGE UP (ref 27–34)
MCHC RBC-ENTMCNC: 32 GM/DL — SIGNIFICANT CHANGE UP (ref 32–36)
MCV RBC AUTO: 93.7 FL — SIGNIFICANT CHANGE UP (ref 80–100)
MONOCYTES # BLD AUTO: 0.33 K/UL — SIGNIFICANT CHANGE UP (ref 0–0.9)
MONOCYTES NFR BLD AUTO: 3 % — SIGNIFICANT CHANGE UP (ref 2–14)
NEUTROPHILS # BLD AUTO: 9.64 K/UL — HIGH (ref 1.8–7.4)
NEUTROPHILS NFR BLD AUTO: 86.7 % — HIGH (ref 43–77)
NRBC # BLD: 0 /100 WBCS — SIGNIFICANT CHANGE UP (ref 0–0)
PLATELET # BLD AUTO: 188 K/UL — SIGNIFICANT CHANGE UP (ref 150–400)
POTASSIUM SERPL-MCNC: 4.7 MMOL/L — SIGNIFICANT CHANGE UP (ref 3.5–5.3)
POTASSIUM SERPL-SCNC: 4.7 MMOL/L — SIGNIFICANT CHANGE UP (ref 3.5–5.3)
RBC # BLD: 3.64 M/UL — LOW (ref 3.8–5.2)
RBC # FLD: 14.6 % — HIGH (ref 10.3–14.5)
SODIUM SERPL-SCNC: 138 MMOL/L — SIGNIFICANT CHANGE UP (ref 135–145)
WBC # BLD: 11.12 K/UL — HIGH (ref 3.8–10.5)
WBC # FLD AUTO: 11.12 K/UL — HIGH (ref 3.8–10.5)

## 2023-10-10 PROCEDURE — 99222 1ST HOSP IP/OBS MODERATE 55: CPT

## 2023-10-10 RX ORDER — OXYCODONE HYDROCHLORIDE 5 MG/1
10 TABLET ORAL EVERY 4 HOURS
Refills: 0 | Status: DISCONTINUED | OUTPATIENT
Start: 2023-10-10 | End: 2023-10-13

## 2023-10-10 RX ORDER — SENNA PLUS 8.6 MG/1
2 TABLET ORAL AT BEDTIME
Refills: 0 | Status: DISCONTINUED | OUTPATIENT
Start: 2023-10-10 | End: 2023-10-13

## 2023-10-10 RX ORDER — OXYCODONE HYDROCHLORIDE 5 MG/1
30 TABLET ORAL EVERY 8 HOURS
Refills: 0 | Status: DISCONTINUED | OUTPATIENT
Start: 2023-10-10 | End: 2023-10-13

## 2023-10-10 RX ORDER — OXYCODONE HYDROCHLORIDE 5 MG/1
15 TABLET ORAL EVERY 4 HOURS
Refills: 0 | Status: DISCONTINUED | OUTPATIENT
Start: 2023-10-10 | End: 2023-10-13

## 2023-10-10 RX ORDER — POLYETHYLENE GLYCOL 3350 17 G/17G
17 POWDER, FOR SOLUTION ORAL DAILY
Refills: 0 | Status: DISCONTINUED | OUTPATIENT
Start: 2023-10-10 | End: 2023-10-13

## 2023-10-10 RX ADMIN — METHOCARBAMOL 500 MILLIGRAM(S): 500 TABLET, FILM COATED ORAL at 14:05

## 2023-10-10 RX ADMIN — OXYCODONE HYDROCHLORIDE 15 MILLIGRAM(S): 5 TABLET ORAL at 17:49

## 2023-10-10 RX ADMIN — OXYCODONE HYDROCHLORIDE 15 MILLIGRAM(S): 5 TABLET ORAL at 13:54

## 2023-10-10 RX ADMIN — ONDANSETRON 4 MILLIGRAM(S): 8 TABLET, FILM COATED ORAL at 17:49

## 2023-10-10 RX ADMIN — ONDANSETRON 4 MILLIGRAM(S): 8 TABLET, FILM COATED ORAL at 15:34

## 2023-10-10 RX ADMIN — ONDANSETRON 4 MILLIGRAM(S): 8 TABLET, FILM COATED ORAL at 06:00

## 2023-10-10 RX ADMIN — Medication 6 MILLIGRAM(S): at 06:01

## 2023-10-10 RX ADMIN — Medication 6 MILLIGRAM(S): at 00:21

## 2023-10-10 RX ADMIN — Medication 975 MILLIGRAM(S): at 12:51

## 2023-10-10 RX ADMIN — Medication 10 MILLIGRAM(S): at 06:00

## 2023-10-10 RX ADMIN — Medication 50 MILLIGRAM(S): at 14:06

## 2023-10-10 RX ADMIN — Medication 975 MILLIGRAM(S): at 02:20

## 2023-10-10 RX ADMIN — Medication 100 MILLIGRAM(S): at 07:34

## 2023-10-10 RX ADMIN — ONDANSETRON 4 MILLIGRAM(S): 8 TABLET, FILM COATED ORAL at 00:21

## 2023-10-10 RX ADMIN — METHOCARBAMOL 500 MILLIGRAM(S): 500 TABLET, FILM COATED ORAL at 06:00

## 2023-10-10 RX ADMIN — OXYCODONE HYDROCHLORIDE 30 MILLIGRAM(S): 5 TABLET ORAL at 14:05

## 2023-10-10 RX ADMIN — Medication 50 MILLIGRAM(S): at 05:59

## 2023-10-10 RX ADMIN — Medication 100 MILLIGRAM(S): at 00:21

## 2023-10-10 RX ADMIN — Medication 975 MILLIGRAM(S): at 13:59

## 2023-10-10 RX ADMIN — HYDROMORPHONE HYDROCHLORIDE 1 MILLIGRAM(S): 2 INJECTION INTRAMUSCULAR; INTRAVENOUS; SUBCUTANEOUS at 04:45

## 2023-10-10 RX ADMIN — OXYCODONE HYDROCHLORIDE 15 MILLIGRAM(S): 5 TABLET ORAL at 18:38

## 2023-10-10 RX ADMIN — Medication 975 MILLIGRAM(S): at 20:15

## 2023-10-10 RX ADMIN — OXYCODONE HYDROCHLORIDE 15 MILLIGRAM(S): 5 TABLET ORAL at 13:01

## 2023-10-10 RX ADMIN — Medication 975 MILLIGRAM(S): at 19:43

## 2023-10-10 RX ADMIN — Medication 6 MILLIGRAM(S): at 12:52

## 2023-10-10 RX ADMIN — Medication 6 MILLIGRAM(S): at 17:49

## 2023-10-10 RX ADMIN — OXYCODONE HYDROCHLORIDE 5 MILLIGRAM(S): 5 TABLET ORAL at 01:49

## 2023-10-10 RX ADMIN — OXYCODONE HYDROCHLORIDE 30 MILLIGRAM(S): 5 TABLET ORAL at 15:59

## 2023-10-10 RX ADMIN — HYDROMORPHONE HYDROCHLORIDE 1 MILLIGRAM(S): 2 INJECTION INTRAMUSCULAR; INTRAVENOUS; SUBCUTANEOUS at 04:11

## 2023-10-10 NOTE — PHYSICAL THERAPY INITIAL EVALUATION ADULT - PHYSICAL ASSIST/NONPHYSICAL ASSIST: SIT/STAND, REHAB EVAL
Verbal cues for safe hand placement and sequencing. Verbal cues to exhale during transfer, to decrease intra-abdominal pressure in order to decrease pain/1 person assist

## 2023-10-10 NOTE — PHYSICAL THERAPY INITIAL EVALUATION ADULT - GAIT DEVIATIONS NOTED, PT EVAL
decreased maddy/increased time in double stance/decreased step length/decreased stride length/increased stride width/decreased weight-shifting ability

## 2023-10-10 NOTE — PATIENT PROFILE ADULT - FALL HARM RISK - RISK INTERVENTIONS
Assistance OOB with selected safe patient handling equipment/Assistance with ambulation/Communicate Fall Risk and Risk Factors to all staff, patient, and family/Discuss with provider need for PT consult/Monitor gait and stability/Provide patient with walking aids - walker, cane, crutches/Reinforce activity limits and safety measures with patient and family/Sit up slowly, dangle for a short time, stand at bedside before walking/Use of alarms - bed, chair and/or voice tab/Visual Cue: Yellow wristband/Bed in lowest position, wheels locked, appropriate side rails in place/Call bell, personal items and telephone in reach/Instruct patient to call for assistance before getting out of bed or chair/Non-slip footwear when patient is out of bed/[place] to call system/Physically safe environment - no spills, clutter or unnecessary equipment/Purposeful Proactive Rounding/Room/bathroom lighting operational, light cord in reach

## 2023-10-10 NOTE — PROGRESS NOTE ADULT - SUBJECTIVE AND OBJECTIVE BOX
Ortho Note    Pain control.  Denies CP, SOB, N/V, numbness/tingling     Vital Signs Last 24 Hrs  T(C): 36.7 (10-10-23 @ 05:55), Max: 36.7 (10-10-23 @ 05:55)  T(F): 98 (10-10-23 @ 05:55), Max: 98 (10-10-23 @ 05:55)  HR: 68 (10-10-23 @ 05:55) (68 - 68)  BP: 121/58 (10-10-23 @ 05:55) (121/58 - 121/58)  BP(mean): --  RR: 18 (10-10-23 @ 05:55) (18 - 18)  SpO2: 96% (10-10-23 @ 05:55) (96% - 96%)  I&O's Summary    09 Oct 2023 07:01  -  10 Oct 2023 07:00  --------------------------------------------------------  IN: 240 mL / OUT: 2237.5 mL / NET: -1997.5 mL      AFVSS  General: Pt Alert and oriented, NAD  Prineo, telfa, tegadeRl C/D/I  Pulses: 2+  Sensation: SILT  Motor: 5/5 EHL/FHL/TA/GS        A/P: 63Y F  s/p revision L2-L5, decompression with extension POD#1  Vitals: VSS  Overnight pain control, following pain mgmt recs, including PCA  Motor/sensory: NVID  PT progress: Pending   H/H - Follow up  Drains: HV – 112.5/112.5  DVT ppx: LVX POD1  Mae: D/c POD#1  Glucose >180: No  Dispo: PT, drain output, TOV      Takes oxycontin 30mg ER bid and oxycodone 30mg PO tid at home  Preliminary Pain management recs (Dr. Colbert):  PLEASE ORDER POST-OP  1.) Tylenol 975mg po q8h (standing)  2.) lyrica 50mg po q8h (standing)  3.) robaxin 500mg po q8h (standing)  4.) oxycontin ER 30mg q8h (standing)  5.) PCA dilaudid 0.2mg/ every 6 min/ max dose over 4h of 9mg  6.) dilaudid 1mg IV q3h prn severe breakthrough pain   Ortho Note    NAEON, aox4, weaned off 2L oxygen, saturating at 95 on RA, pt denies SOB, chest pain, headache, nausea/vomiting, numbness/tingling. Mild pain/discomfort, dilaudid 1mg and oxy 5mg given. PCA pump at bedside. Incision site CDI. Pending Mae removal    Vital Signs Last 24 Hrs  T(C): 36.7 (10-10-23 @ 05:55), Max: 36.7 (10-10-23 @ 05:55)  T(F): 98 (10-10-23 @ 05:55), Max: 98 (10-10-23 @ 05:55)  HR: 68 (10-10-23 @ 05:55) (68 - 68)  BP: 121/58 (10-10-23 @ 05:55) (121/58 - 121/58)  BP(mean): --  RR: 18 (10-10-23 @ 05:55) (18 - 18)  SpO2: 96% (10-10-23 @ 05:55) (96% - 96%)  I&O's Summary    09 Oct 2023 07:01  -  10 Oct 2023 07:00  --------------------------------------------------------  IN: 240 mL / OUT: 2237.5 mL / NET: -1997.5 mL      AFVSS  General: Pt Alert and oriented, NAD  Prineo, telfa, tegadermDSG C/D/I  Pulses: 2+  Sensation: SILT  Motor: 5/5 EHL/FHL/TA/GS        A/P: 63Y F  s/p revision L2-L5, decompression with extension POD#1  Vitals: VSS  Overnight pain control, following pain mgmt recs, including PCA  Motor/sensory: NVID  PT progress: Pending   H/H - Follow up  Drains: HV – 112.5/112.5  DVT ppx: LVX POD1  Mae: D/c POD#1  Glucose >180: No  Dispo: PT, drain output, TOV      Takes oxycontin 30mg ER bid and oxycodone 30mg PO tid at home  Preliminary Pain management recs (Dr. Colbert):  PLEASE ORDER POST-OP  1.) Tylenol 975mg po q8h (standing)  2.) lyrica 50mg po q8h (standing)  3.) robaxin 500mg po q8h (standing)  4.) oxycontin ER 30mg q8h (standing)  5.) PCA dilaudid 0.2mg/ every 6 min/ max dose over 4h of 9mg  6.) dilaudid 1mg IV q3h prn severe breakthrough pain

## 2023-10-10 NOTE — PHYSICAL THERAPY INITIAL EVALUATION ADULT - ADDITIONAL COMMENTS
Pt required use of cane due to leg pain/weakness. Pt also has rollator but could not use due to elevator being out of order.  Pt lives in apt building on [address]th floor but elevator is out of service.   Pt lives with another person, but pt did not want to specify further and only requested to speak to the .

## 2023-10-10 NOTE — PHYSICAL THERAPY INITIAL EVALUATION ADULT - IMPAIRMENTS FOUND, PT EVAL
[Fever] : no fever [Fatigue] : no fatigue [Chills] : no chills [Poor Appetite] : no poor appetite [Chest Discomfort] : no chest discomfort [Negative] : Musculoskeletal [[id number]] : Post prostate biopsy aerobic capacity/endurance/ergonomics and body mechanics/gait, locomotion, and balance/gross motor/muscle strength/posture
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
## 2023-10-11 ENCOUNTER — TRANSCRIPTION ENCOUNTER (OUTPATIENT)
Age: 63
End: 2023-10-11

## 2023-10-11 LAB
ANION GAP SERPL CALC-SCNC: 4 MMOL/L — LOW (ref 5–17)
BASOPHILS # BLD AUTO: 0.03 K/UL — SIGNIFICANT CHANGE UP (ref 0–0.2)
BASOPHILS NFR BLD AUTO: 0.2 % — SIGNIFICANT CHANGE UP (ref 0–2)
BUN SERPL-MCNC: 19 MG/DL — SIGNIFICANT CHANGE UP (ref 7–23)
CALCIUM SERPL-MCNC: 9.4 MG/DL — SIGNIFICANT CHANGE UP (ref 8.4–10.5)
CHLORIDE SERPL-SCNC: 109 MMOL/L — HIGH (ref 96–108)
CO2 SERPL-SCNC: 29 MMOL/L — SIGNIFICANT CHANGE UP (ref 22–31)
CREAT SERPL-MCNC: 0.95 MG/DL — SIGNIFICANT CHANGE UP (ref 0.5–1.3)
EGFR: 67 ML/MIN/1.73M2 — SIGNIFICANT CHANGE UP
EOSINOPHIL # BLD AUTO: 0 K/UL — SIGNIFICANT CHANGE UP (ref 0–0.5)
EOSINOPHIL NFR BLD AUTO: 0 % — SIGNIFICANT CHANGE UP (ref 0–6)
GLUCOSE SERPL-MCNC: 107 MG/DL — HIGH (ref 70–99)
HCT VFR BLD CALC: 32.1 % — LOW (ref 34.5–45)
HGB BLD-MCNC: 10.5 G/DL — LOW (ref 11.5–15.5)
IMM GRANULOCYTES NFR BLD AUTO: 0.5 % — SIGNIFICANT CHANGE UP (ref 0–0.9)
LYMPHOCYTES # BLD AUTO: 15.7 % — SIGNIFICANT CHANGE UP (ref 13–44)
LYMPHOCYTES # BLD AUTO: 2.48 K/UL — SIGNIFICANT CHANGE UP (ref 1–3.3)
MCHC RBC-ENTMCNC: 30 PG — SIGNIFICANT CHANGE UP (ref 27–34)
MCHC RBC-ENTMCNC: 32.7 GM/DL — SIGNIFICANT CHANGE UP (ref 32–36)
MCV RBC AUTO: 91.7 FL — SIGNIFICANT CHANGE UP (ref 80–100)
MONOCYTES # BLD AUTO: 1.23 K/UL — HIGH (ref 0–0.9)
MONOCYTES NFR BLD AUTO: 7.8 % — SIGNIFICANT CHANGE UP (ref 2–14)
NEUTROPHILS # BLD AUTO: 11.97 K/UL — HIGH (ref 1.8–7.4)
NEUTROPHILS NFR BLD AUTO: 75.8 % — SIGNIFICANT CHANGE UP (ref 43–77)
NRBC # BLD: 0 /100 WBCS — SIGNIFICANT CHANGE UP (ref 0–0)
PLATELET # BLD AUTO: 182 K/UL — SIGNIFICANT CHANGE UP (ref 150–400)
POTASSIUM SERPL-MCNC: 4.9 MMOL/L — SIGNIFICANT CHANGE UP (ref 3.5–5.3)
POTASSIUM SERPL-SCNC: 4.9 MMOL/L — SIGNIFICANT CHANGE UP (ref 3.5–5.3)
RBC # BLD: 3.5 M/UL — LOW (ref 3.8–5.2)
RBC # FLD: 15 % — HIGH (ref 10.3–14.5)
SODIUM SERPL-SCNC: 142 MMOL/L — SIGNIFICANT CHANGE UP (ref 135–145)
WBC # BLD: 15.79 K/UL — HIGH (ref 3.8–10.5)
WBC # FLD AUTO: 15.79 K/UL — HIGH (ref 3.8–10.5)

## 2023-10-11 PROCEDURE — 99231 SBSQ HOSP IP/OBS SF/LOW 25: CPT

## 2023-10-11 PROCEDURE — 99232 SBSQ HOSP IP/OBS MODERATE 35: CPT

## 2023-10-11 RX ORDER — SULFADIAZINE
500 POWDER (GRAM) MISCELLANEOUS
Refills: 0 | Status: DISCONTINUED | OUTPATIENT
Start: 2023-10-11 | End: 2023-10-11

## 2023-10-11 RX ORDER — HYDROXYCHLOROQUINE SULFATE 200 MG
200 TABLET ORAL DAILY
Refills: 0 | Status: DISCONTINUED | OUTPATIENT
Start: 2023-10-11 | End: 2023-10-13

## 2023-10-11 RX ORDER — SULFASALAZINE 500 MG
500 TABLET ORAL
Refills: 0 | Status: DISCONTINUED | OUTPATIENT
Start: 2023-10-11 | End: 2023-10-13

## 2023-10-11 RX ORDER — NICOTINE POLACRILEX 2 MG
1 GUM BUCCAL DAILY
Refills: 0 | Status: DISCONTINUED | OUTPATIENT
Start: 2023-10-11 | End: 2023-10-13

## 2023-10-11 RX ADMIN — OXYCODONE HYDROCHLORIDE 15 MILLIGRAM(S): 5 TABLET ORAL at 01:20

## 2023-10-11 RX ADMIN — OXYCODONE HYDROCHLORIDE 15 MILLIGRAM(S): 5 TABLET ORAL at 07:31

## 2023-10-11 RX ADMIN — POLYETHYLENE GLYCOL 3350 17 GRAM(S): 17 POWDER, FOR SOLUTION ORAL at 11:28

## 2023-10-11 RX ADMIN — ENOXAPARIN SODIUM 40 MILLIGRAM(S): 100 INJECTION SUBCUTANEOUS at 21:04

## 2023-10-11 RX ADMIN — OXYCODONE HYDROCHLORIDE 15 MILLIGRAM(S): 5 TABLET ORAL at 06:45

## 2023-10-11 RX ADMIN — METHOCARBAMOL 500 MILLIGRAM(S): 500 TABLET, FILM COATED ORAL at 21:03

## 2023-10-11 RX ADMIN — Medication 200 MILLIGRAM(S): at 14:49

## 2023-10-11 RX ADMIN — Medication 975 MILLIGRAM(S): at 18:08

## 2023-10-11 RX ADMIN — Medication 500 MILLIGRAM(S): at 18:18

## 2023-10-11 RX ADMIN — OXYCODONE HYDROCHLORIDE 30 MILLIGRAM(S): 5 TABLET ORAL at 22:00

## 2023-10-11 RX ADMIN — Medication 975 MILLIGRAM(S): at 10:37

## 2023-10-11 RX ADMIN — METHOCARBAMOL 500 MILLIGRAM(S): 500 TABLET, FILM COATED ORAL at 13:29

## 2023-10-11 RX ADMIN — METHOCARBAMOL 500 MILLIGRAM(S): 500 TABLET, FILM COATED ORAL at 05:24

## 2023-10-11 RX ADMIN — OXYCODONE HYDROCHLORIDE 15 MILLIGRAM(S): 5 TABLET ORAL at 00:50

## 2023-10-11 RX ADMIN — Medication 10 MILLIGRAM(S): at 05:23

## 2023-10-11 RX ADMIN — OXYCODONE HYDROCHLORIDE 15 MILLIGRAM(S): 5 TABLET ORAL at 11:45

## 2023-10-11 RX ADMIN — Medication 50 MILLIGRAM(S): at 13:29

## 2023-10-11 RX ADMIN — ATORVASTATIN CALCIUM 40 MILLIGRAM(S): 80 TABLET, FILM COATED ORAL at 21:03

## 2023-10-11 RX ADMIN — OXYCODONE HYDROCHLORIDE 15 MILLIGRAM(S): 5 TABLET ORAL at 10:39

## 2023-10-11 RX ADMIN — Medication 50 MILLIGRAM(S): at 21:03

## 2023-10-11 RX ADMIN — OXYCODONE HYDROCHLORIDE 30 MILLIGRAM(S): 5 TABLET ORAL at 21:03

## 2023-10-11 RX ADMIN — OXYCODONE HYDROCHLORIDE 30 MILLIGRAM(S): 5 TABLET ORAL at 13:29

## 2023-10-11 RX ADMIN — OXYCODONE HYDROCHLORIDE 30 MILLIGRAM(S): 5 TABLET ORAL at 05:23

## 2023-10-11 RX ADMIN — Medication 50 MILLIGRAM(S): at 05:23

## 2023-10-11 NOTE — DISCHARGE NOTE PROVIDER - NSDCCPTREATMENT_GEN_ALL_CORE_FT
PRINCIPAL PROCEDURE  Procedure: Revision, decompression, spine, lumbar  Findings and Treatment:

## 2023-10-11 NOTE — DISCHARGE NOTE PROVIDER - NSDCCPCAREPLAN_GEN_ALL_CORE_FT
PRINCIPAL DISCHARGE DIAGNOSIS  Diagnosis: Spinal stenosis of lumbar region without neurogenic claudication  Assessment and Plan of Treatment:

## 2023-10-11 NOTE — PROGRESS NOTE ADULT - SUBJECTIVE AND OBJECTIVE BOX
Ortho Note    Subjective:  Pt comfortable without complaints, pain controlled with current pain medication regimen   Denies CP, SOB, N/V, numbness/tingling   Reviewed plan of care with patient at bedside      Vital Signs Last 24 Hrs  T(C): 36.6 (10-11-23 @ 08:25), Max: 36.6 (10-11-23 @ 04:47)  T(F): 97.9 (10-11-23 @ 08:25), Max: 97.9 (10-11-23 @ 04:47)  HR: 72 (10-11-23 @ 08:25) (53 - 72)  BP: 136/74 (10-11-23 @ 08:25) (109/66 - 136/74)  BP(mean): --  RR: 18 (10-11-23 @ 08:25) (18 - 18)  SpO2: 96% (10-11-23 @ 08:25) (94% - 96%)  AVSS    Objective:    Physical Exam:  General: Pt Alert and oriented, NAD  Lumbar telfa and tegaderm DSG C/D/I- HV x1- hv dcd 10-11  Pulses: +2 DP bilateral lower extremities,   Sensation: silt intact bilateral lower extremities  Motor: EHL/FHL/TA/GS- 5/5 bilateral lower extremiteis      Plan of Care:  A/P: 63yFemale POD#2 s/p revision L2-L5 decompression with extension of instrumented fusion to L2   - afebrile  - Pain Control- Oxycodone 10-15mg PO Q4h prn moderate to severe pain, Oxycontin 30mg Q8h, tylenol 975mg PO Q8h, methocarbamol 500mg PO Q8h , lyrica 50mg PO Q8h   - DVT ppx: lovenox 40mg subq daily  - PT, WBS: WBAT  - dc drain today 10-11  - ambulate with pt as tolerated  - appreciate pain management recs  - appreciate medicine recs   - standing xray when patient can tolerate  - Dispo- Radha pending auth and acceptance,  dc of drain , standing xray     Ortho Pager [phone]

## 2023-10-11 NOTE — PROGRESS NOTE ADULT - SUBJECTIVE AND OBJECTIVE BOX
Pain Management Note    Subjective:  Pt seen and examined today. Patient reports pain is controlled with current pain medication regimen.   Denies numbness and tinigling, no s/s of oversedation  Patient able to tolerate PT  Reviewed pain medication regimen with patient at bedside          Vital Signs Last 24 Hrs  T(C): 36.6 (10-11-23 @ 08:25), Max: 36.6 (10-11-23 @ 08:25)  T(F): 97.9 (10-11-23 @ 08:25), Max: 97.9 (10-11-23 @ 08:25)  HR: 72 (10-11-23 @ 08:25) (72 - 72)  BP: 136/74 (10-11-23 @ 08:25) (136/74 - 136/74)  BP(mean): --  RR: 18 (10-11-23 @ 08:25) (18 - 18)  SpO2: 96% (10-11-23 @ 08:25) (96% - 96%)  AVSS    Objective:    Physical Exam:  General: Pt Alert and oriented, NAD  Lumbar telfa and tegaderm DSG C/D/I- HV x1- hv dcd 10-11  Pulses: +2 DP bilateral lower extremities,   Sensation: silt intact bilateral lower extremities  Motor: EHL/FHL/TA/GS- 5/5 bilateral lower extremities          Plan of Care:  A/P: 63yFemale POD#2 s/p revision L2-L5 decompression with extension of instrumented fusion to L2   -Oxycodone 10-15mg PO Q4h prn moderate to severe pain, -Oxycontin 30mg Q8h,   -tylenol 975mg PO Q8h,   -methocarbamol 500mg PO Q8h ,   - lyrica 50mg PO Q8h   Plan discussed with Dr. Colbert       Ortho Pager [phone]

## 2023-10-11 NOTE — DISCHARGE NOTE PROVIDER - HOSPITAL COURSE
Admitted on 10/8/23  Attending: Dr. Elias  Surgery:  s/p revision L2-L5, decompression with extension of instrumented fusion to L2   Lizzette-op Antibiotics  Pain control  DVT prophylaxis: Lovenox q24 hours   OOB/Physical Therapy/Occupational Therapy   Medicine Consult   Pain Management consult   Drain management     PCA ordered for pain management overnight POD #0, transitioned to oral pain medications POD #1   Admitted on 10/8/23  Attending: Dr. Elias  Surgery:  s/p revision L2-L5, decompression with extension of instrumented fusion to L2   Lizzette-op Antibiotics  Pain control  DVT prophylaxis: Lovenox q24 hours   OOB/Physical Therapy/Occupational Therapy   Medicine Consult   Pain Management consult   Drain management     PCA ordered for pain management overnight POD #0, transitioned to oral pain medications POD #1  drain dcd 10-11  standing xray completed 10-12

## 2023-10-11 NOTE — PROGRESS NOTE ADULT - SUBJECTIVE AND OBJECTIVE BOX
Ortho Note    Pt comfortable, c/o some pain, but otherwise doing well  Denies CP, SOB, N/V, numbness/tingling     Vital Signs Last 24 Hrs  T(C): 36.6 (10-11-23 @ 04:47), Max: 36.6 (10-11-23 @ 04:47)  T(F): 97.9 (10-11-23 @ 04:47), Max: 97.9 (10-11-23 @ 04:47)  HR: 53 (10-11-23 @ 04:47) (53 - 53)  BP: 109/66 (10-11-23 @ 04:47) (109/66 - 109/66)  BP(mean): --  RR: 18 (10-11-23 @ 04:47) (18 - 18)  SpO2: 94% (10-11-23 @ 04:47) (94% - 94%)  I&O's Summary    10 Oct 2023 07:01  -  11 Oct 2023 07:00  --------------------------------------------------------  IN: 1820 mL / OUT: 2480 mL / NET: -660 mL      AFVSS  General: Pt Alert and oriented, NAD  Shy, telfa, tegadermDSG C/D/I  Pulses: 2+  Sensation: SILT  Motor: 5/5 EHL/FHL/TA/GS                          10.5   15.79 )-----------( 182      ( 11 Oct 2023 05:30 )             32.1     10-11    142  |  109<H>  |  19  ----------------------------<  107<H>  4.9   |  29  |  0.95    Ca    9.4      11 Oct 2023 05:30        A/P: 63Y F  s/p revision L2-L5, decompression with extension POD#2  Vitals: VSS  Overnight pain control, following pain mgmt recs  Motor/sensory: NVID  PT progress: GISELL  H/H - Follow up  Drains: HV – 0/80  DVT ppx: LVX POD1  Mae: D/c   Glucose >180: No  Dispo: PT, drain output,       Takes oxycontin 30mg ER bid and oxycodone 30mg PO tid at home  Preliminary Pain management recs (Dr. Colbert):  PLEASE ORDER POST-OP  1.) Tylenol 975mg po q8h (standing)  2.) lyrica 50mg po q8h (standing)  3.) robaxin 500mg po q8h (standing)  4.) oxycontin ER 30mg q8h (standing)  5.) PCA dilaudid 0.2mg/ every 6 min/ max dose over 4h of 9mg  6.) dilaudid 1mg IV q3h prn severe breakthrough pain

## 2023-10-11 NOTE — DISCHARGE NOTE PROVIDER - NSDCMRMEDTOKEN_GEN_ALL_CORE_FT
aspirin 81 mg oral capsule: 1 cap(s) orally once a day Home medication  atorvastatin 40 mg oral tablet: 1 tab(s) orally once  Chantix 1 mg oral tablet: 1 tab(s) orally 2 times a day  Colace 100 mg oral capsule: 1 cap(s) orally 2 times a day  cyclobenzaprine 5 mg oral tablet: 1 tab(s) orally 3 times a day as needed for Muscle Spasm MDD: 3  hydroCHLOROthiazide 12.5 mg oral capsule: 1 cap(s) orally once a day  oxyCODONE 30 mg oral tablet, extended release: 1 tab(s) orally 2 times a day  oxycontin: 30 mg TID  pantoprazole 40 mg oral delayed release tablet: 1 tab(s) orally once a day (before a meal)  Plaquenil 200 mg oral tablet: 1 tab(s) orally once a day  predniSONE 10 mg oral tablet: 1 tab(s) orally once a day  pregabalin 50 mg oral capsule: 1 cap(s) orally 3 times a day MDD: 3  senna leaf extract oral tablet: 2 tab(s) orally once a day (at bedtime)  sulfaSALAzine 500 mg oral tablet: 1 orally 2 times a day   acetaminophen 325 mg oral tablet: 3 tab(s) orally every 8 hours  atorvastatin 40 mg oral tablet: 1 tab(s) orally once a day (at bedtime)  cyclobenzaprine 5 mg oral tablet: 1 tab(s) orally 3 times a day as needed for Muscle Spasm MDD: 3  enoxaparin 40 mg/0.4 mL injectable solution: 40 milligram(s) subcutaneously once a day  hydroxychloroquine 200 mg oral tablet: 1 tab(s) orally once a day  nicotine 14 mg/24 hr transdermal film, extended release: 1 patch transdermal once a day  oxyCODONE 10 mg oral tablet: 1 tab(s) orally every 4 hours As needed Moderate Pain (4 - 6)  oxyCODONE 15 mg oral tablet: 1 tab(s) orally every 4 hours As needed Severe Pain (7 - 10)  oxyCODONE 30 mg oral tablet, extended release: 1 tab(s) orally every 8 hours  oxycontin: 30 mg TID  pantoprazole 40 mg oral delayed release tablet: 1 tab(s) orally once a day (before a meal)  polyethylene glycol 3350 oral powder for reconstitution: 17 gram(s) orally once a day  predniSONE 10 mg oral tablet: 1 tab(s) orally 3 times a week M W F  pregabalin 50 mg oral capsule: 1 cap(s) orally 3 times a day MDD: 3  senna leaf extract oral tablet: 2 tab(s) orally once a day (at bedtime)  sulfaSALAzine 500 mg oral tablet: 1 tab(s) orally 2 times a day

## 2023-10-11 NOTE — DISCHARGE NOTE PROVIDER - NSDCFUADDINST_GEN_ALL_CORE_FT
s/p: revision L2-L5, decompression with extension of instrumented fusion to L2     ACTIVITY:   - No extreme bending, extending, turning, twisting, or straining. No strenuous activity, heavy lifting, driving or returning to work until cleared by your surgeon.     DRESSING/SHOWERING:    (PRINEO – mesh with glue)   -May shower post-op day 1, pat dry afterwards. Dressing is water-resistant. Do not soak in bathtubs. Do not need to cover with another dressing. Do not remove mesh dressing – it will be taken care of in your follow up appointment. Do not apply ointments, creams or oils to incision area.     MEDICATION/ANTICOAGULATION:   - You have been prescribed medications for pain:   - Tylenol (Acetaminophen) for mild to moderate pain. Do not exceed 3,000mg daily.   - For more severe pain, you may continue to take the Tylenol with the addition of narcotic pain medication. Take this medication as prescribed. Do not take more than prescribed. Note that this medication may cause drowsiness or dizziness. Do not operate machinery. This medication may cause constipation.   - If you have been prescribed a muscle relaxer, take this medication as needed for muscle spasm. Follow instructions on bottle.     - Try to have regular bowel movements. Take stool softener or laxative if necessary. You may wish to take Miralax daily until you have regular bowel movements.    - Do not take antiinflammatories (Aleve, Advil, Naproxen, Ibuprofen, etc.) until cleared by your surgeon. Tylenol is not an anti-inflammatory and okay to take (see above).   - If you have a pain management physician, please follow-up with them postoperatively.    - If you experience any negative side effects of your medications, please call your surgeon's office to discuss.     FOLLOW UP:   - Call to schedule an appt with Dr. Elias for follow up.    - Please follow-up with your primary care physician or any other specialist you see postoperatively, if needed.    - Contact your doctor or go to the emergency room if you experience: fever greater than 101.5, chills, chest pain, difficulty breathing, redness or excessive drainage around the incision, other concerns. s/p: revision L2-L5, decompression with extension of instrumented fusion to L2     ACTIVITY:   - No extreme bending, extending, turning, twisting, or straining. No strenuous activity, heavy lifting, driving or returning to work until cleared by your surgeon.     DRESSING/SHOWERING:    (PRINEO – mesh with glue)   -May shower post-op day 1, pat dry afterwards. Dressing is water-resistant. Do not soak in bathtubs. Do not need to cover with another dressing. Do not remove mesh dressing – it will be taken care of in your follow up appointment. Do not apply ointments, creams or oils to incision area.     MEDICATION/ANTICOAGULATION:   - You have been prescribed medications for pain:   - Tylenol (Acetaminophen) for mild to moderate pain. Do not exceed 3,000mg daily.   - For more severe pain, you may continue to take the Tylenol with the addition of narcotic pain medication. Take this medication as prescribed. Do not take more than prescribed. Note that this medication may cause drowsiness or dizziness. Do not operate machinery. This medication may cause constipation.   - If you have been prescribed a muscle relaxer, take this medication as needed for muscle spasm. Follow instructions on bottle.     - Try to have regular bowel movements. Take stool softener or laxative if necessary. You may wish to take Miralax daily until you have regular bowel movements.    - Do not take antiinflammatories (Aleve, Advil, Naproxen, Ibuprofen, etc.) until cleared by your surgeon. Tylenol is not an anti-inflammatory and okay to take (see above).   - If you have a pain management physician, please follow-up with them postoperatively.    - If you experience any negative side effects of your medications, please call your surgeon's office to discuss.     FOLLOW UP:   - Call to schedule an appt with Dr. Elias for follow up.    - Please follow-up with your primary care physician or any other specialist you see postoperatively, if needed.    - Contact your doctor or go to the emergency room if you experience: fever greater than 101.5, chills, chest pain, difficulty breathing, redness or excessive drainage around the incision, other concerns.    Followup with your pain management doctor for outpatient pain management  resume all home medications in rehab.

## 2023-10-11 NOTE — DISCHARGE NOTE PROVIDER - CARE PROVIDER_API CALL
Dru Elias Ranulfo  Orthopaedic Surgery  [address] [place] [address], [address]  [place] [address]  Phone: [phone]  Fax: [phone]  Follow Up Time: 2 weeks

## 2023-10-11 NOTE — PROGRESS NOTE ADULT - SUBJECTIVE AND OBJECTIVE BOX
SUBJECTIVE; NAEON. Though patient states her pain is severe and uncontrolled. She also is complaining of dysuria. Otherwise no fever, cough, chest pain, abd pain.     MEDICATIONS:  MEDICATIONS  (STANDING):  acetaminophen     Tablet .. 975 milliGRAM(s) Oral every 8 hours  atorvastatin 40 milliGRAM(s) Oral at bedtime  enoxaparin Injectable 40 milliGRAM(s) SubCutaneous every 24 hours  hydrochlorothiazide 12.5 milliGRAM(s) Oral daily  hydroxychloroquine 200 milliGRAM(s) Oral daily  methocarbamol 500 milliGRAM(s) Oral every 8 hours  ondansetron   Disintegrating Tablet 4 milliGRAM(s) Oral every 6 hours  oxyCODONE  ER Tablet 30 milliGRAM(s) Oral every 8 hours  polyethylene glycol 3350 17 Gram(s) Oral daily  predniSONE   Tablet 10 milliGRAM(s) Oral <User Schedule>  pregabalin 50 milliGRAM(s) Oral every 8 hours  senna 2 Tablet(s) Oral at bedtime  sulfADIAZINE 500 milliGRAM(s) Oral two times a day    MEDICATIONS  (PRN):  naloxone Injectable 0.1 milliGRAM(s) IV Push every 3 minutes PRN For ANY of the following changes in patient status:  A. RR LESS THAN 10 breaths per minute, B. Oxygen saturation LESS THAN 90%, C. Sedation score of 6  ondansetron Injectable 4 milliGRAM(s) IV Push every 6 hours PRN Nausea and/or Vomiting  oxyCODONE    IR 10 milliGRAM(s) Oral every 4 hours PRN Moderate Pain (4 - 6)  oxyCODONE    IR 15 milliGRAM(s) Oral every 4 hours PRN Severe Pain (7 - 10)      Allergies    rituximab (Vomiting; Urticaria)    Intolerances        OBJECTIVE:  Vital Signs Last 24 Hrs  T(C): 35.8 (11 Oct 2023 12:35), Max: 37.3 (10 Oct 2023 18:11)  T(F): 96.5 (11 Oct 2023 12:35), Max: 99.1 (10 Oct 2023 18:11)  HR: 61 (11 Oct 2023 12:35) (53 - 73)  BP: 134/64 (11 Oct 2023 12:35) (109/66 - 136/74)  BP(mean): --  RR: 18 (11 Oct 2023 12:35) (18 - 18)  SpO2: 98% (11 Oct 2023 12:35) (94% - 100%)    Parameters below as of 11 Oct 2023 12:35  Patient On (Oxygen Delivery Method): room air      I&O's Summary    10 Oct 2023 07:01  -  11 Oct 2023 07:00  --------------------------------------------------------  IN: 1820 mL / OUT: 2480 mL / NET: -660 mL    11 Oct 2023 07:01  -  11 Oct 2023 14:54  --------------------------------------------------------  IN: 300 mL / OUT: 0 mL / NET: 300 mL        PHYSICAL EXAM:  Gen: moderate distress 2/2 pain  HEENT: NCAT, MMM  Neck: supple  CV: RRR, no m/r/g, peripheral pulses 2+  Pulm: CTAB, no increased work of breathing, no rales/rhonchi  Abd: soft, ND, NT, no rebound or guarding  Skin: warm and dry  Ext: back dressing CDI with drains in place  Neuro: AOx3, speaking in full sentences  Psych: affect and behavior appropriate, pleasant at time of interview    LABS:                        10.5   15.79 )-----------( 182      ( 11 Oct 2023 05:30 )             32.1     10-11    142  |  109<H>  |  19  ----------------------------<  107<H>  4.9   |  29  |  0.95    Ca    9.4      11 Oct 2023 05:30          CAPILLARY BLOOD GLUCOSE        Urinalysis Basic - ( 11 Oct 2023 05:30 )    Color: x / Appearance: x / SG: x / pH: x  Gluc: 107 mg/dL / Ketone: x  / Bili: x / Urobili: x   Blood: x / Protein: x / Nitrite: x   Leuk Esterase: x / RBC: x / WBC x   Sq Epi: x / Non Sq Epi: x / Bacteria: x        MICRODATA:      RADIOLOGY/OTHER STUDIES:

## 2023-10-12 LAB
ANION GAP SERPL CALC-SCNC: 6 MMOL/L — SIGNIFICANT CHANGE UP (ref 5–17)
APPEARANCE UR: CLEAR — SIGNIFICANT CHANGE UP
BASOPHILS # BLD AUTO: 0.04 K/UL — SIGNIFICANT CHANGE UP (ref 0–0.2)
BASOPHILS NFR BLD AUTO: 0.4 % — SIGNIFICANT CHANGE UP (ref 0–2)
BILIRUB UR-MCNC: NEGATIVE — SIGNIFICANT CHANGE UP
BUN SERPL-MCNC: 23 MG/DL — SIGNIFICANT CHANGE UP (ref 7–23)
CALCIUM SERPL-MCNC: 9.4 MG/DL — SIGNIFICANT CHANGE UP (ref 8.4–10.5)
CHLORIDE SERPL-SCNC: 107 MMOL/L — SIGNIFICANT CHANGE UP (ref 96–108)
CO2 SERPL-SCNC: 32 MMOL/L — HIGH (ref 22–31)
COLOR SPEC: YELLOW — SIGNIFICANT CHANGE UP
CREAT SERPL-MCNC: 1.09 MG/DL — SIGNIFICANT CHANGE UP (ref 0.5–1.3)
DIFF PNL FLD: NEGATIVE — SIGNIFICANT CHANGE UP
EGFR: 57 ML/MIN/1.73M2 — LOW
EOSINOPHIL # BLD AUTO: 0.12 K/UL — SIGNIFICANT CHANGE UP (ref 0–0.5)
EOSINOPHIL NFR BLD AUTO: 1.1 % — SIGNIFICANT CHANGE UP (ref 0–6)
GLUCOSE SERPL-MCNC: 84 MG/DL — SIGNIFICANT CHANGE UP (ref 70–99)
GLUCOSE UR QL: NEGATIVE — SIGNIFICANT CHANGE UP
HCT VFR BLD CALC: 35 % — SIGNIFICANT CHANGE UP (ref 34.5–45)
HGB BLD-MCNC: 10.8 G/DL — LOW (ref 11.5–15.5)
IMM GRANULOCYTES NFR BLD AUTO: 0.6 % — SIGNIFICANT CHANGE UP (ref 0–0.9)
KETONES UR-MCNC: NEGATIVE — SIGNIFICANT CHANGE UP
LEUKOCYTE ESTERASE UR-ACNC: NEGATIVE — SIGNIFICANT CHANGE UP
LYMPHOCYTES # BLD AUTO: 4.91 K/UL — HIGH (ref 1–3.3)
LYMPHOCYTES # BLD AUTO: 43.4 % — SIGNIFICANT CHANGE UP (ref 13–44)
MCHC RBC-ENTMCNC: 29.3 PG — SIGNIFICANT CHANGE UP (ref 27–34)
MCHC RBC-ENTMCNC: 30.9 GM/DL — LOW (ref 32–36)
MCV RBC AUTO: 95.1 FL — SIGNIFICANT CHANGE UP (ref 80–100)
MONOCYTES # BLD AUTO: 1.14 K/UL — HIGH (ref 0–0.9)
MONOCYTES NFR BLD AUTO: 10.1 % — SIGNIFICANT CHANGE UP (ref 2–14)
NEUTROPHILS # BLD AUTO: 5.03 K/UL — SIGNIFICANT CHANGE UP (ref 1.8–7.4)
NEUTROPHILS NFR BLD AUTO: 44.4 % — SIGNIFICANT CHANGE UP (ref 43–77)
NITRITE UR-MCNC: NEGATIVE — SIGNIFICANT CHANGE UP
NRBC # BLD: 0 /100 WBCS — SIGNIFICANT CHANGE UP (ref 0–0)
PH UR: 6 — SIGNIFICANT CHANGE UP (ref 5–8)
PLATELET # BLD AUTO: 175 K/UL — SIGNIFICANT CHANGE UP (ref 150–400)
POTASSIUM SERPL-MCNC: 4.6 MMOL/L — SIGNIFICANT CHANGE UP (ref 3.5–5.3)
POTASSIUM SERPL-SCNC: 4.6 MMOL/L — SIGNIFICANT CHANGE UP (ref 3.5–5.3)
PROT UR-MCNC: NEGATIVE MG/DL — SIGNIFICANT CHANGE UP
RBC # BLD: 3.68 M/UL — LOW (ref 3.8–5.2)
RBC # FLD: 15.2 % — HIGH (ref 10.3–14.5)
SODIUM SERPL-SCNC: 145 MMOL/L — SIGNIFICANT CHANGE UP (ref 135–145)
SP GR SPEC: 1.02 — SIGNIFICANT CHANGE UP (ref 1–1.03)
UROBILINOGEN FLD QL: 0.2 E.U./DL — SIGNIFICANT CHANGE UP
WBC # BLD: 11.31 K/UL — HIGH (ref 3.8–10.5)
WBC # FLD AUTO: 11.31 K/UL — HIGH (ref 3.8–10.5)

## 2023-10-12 PROCEDURE — 72100 X-RAY EXAM L-S SPINE 2/3 VWS: CPT | Mod: 26

## 2023-10-12 PROCEDURE — 99232 SBSQ HOSP IP/OBS MODERATE 35: CPT

## 2023-10-12 RX ORDER — SODIUM CHLORIDE 9 MG/ML
1000 INJECTION INTRAMUSCULAR; INTRAVENOUS; SUBCUTANEOUS ONCE
Refills: 0 | Status: COMPLETED | OUTPATIENT
Start: 2023-10-12 | End: 2023-10-12

## 2023-10-12 RX ORDER — GABAPENTIN 400 MG/1
300 CAPSULE ORAL EVERY 8 HOURS
Refills: 0 | Status: DISCONTINUED | OUTPATIENT
Start: 2023-10-12 | End: 2023-10-13

## 2023-10-12 RX ADMIN — Medication 975 MILLIGRAM(S): at 14:05

## 2023-10-12 RX ADMIN — METHOCARBAMOL 500 MILLIGRAM(S): 500 TABLET, FILM COATED ORAL at 14:06

## 2023-10-12 RX ADMIN — OXYCODONE HYDROCHLORIDE 30 MILLIGRAM(S): 5 TABLET ORAL at 05:41

## 2023-10-12 RX ADMIN — SODIUM CHLORIDE 1000 MILLILITER(S): 9 INJECTION INTRAMUSCULAR; INTRAVENOUS; SUBCUTANEOUS at 11:00

## 2023-10-12 RX ADMIN — Medication 975 MILLIGRAM(S): at 05:42

## 2023-10-12 RX ADMIN — OXYCODONE HYDROCHLORIDE 30 MILLIGRAM(S): 5 TABLET ORAL at 14:05

## 2023-10-12 RX ADMIN — Medication 975 MILLIGRAM(S): at 06:40

## 2023-10-12 RX ADMIN — OXYCODONE HYDROCHLORIDE 15 MILLIGRAM(S): 5 TABLET ORAL at 14:15

## 2023-10-12 RX ADMIN — OXYCODONE HYDROCHLORIDE 15 MILLIGRAM(S): 5 TABLET ORAL at 18:50

## 2023-10-12 RX ADMIN — Medication 975 MILLIGRAM(S): at 21:47

## 2023-10-12 RX ADMIN — Medication 500 MILLIGRAM(S): at 05:40

## 2023-10-12 RX ADMIN — ENOXAPARIN SODIUM 40 MILLIGRAM(S): 100 INJECTION SUBCUTANEOUS at 21:48

## 2023-10-12 RX ADMIN — OXYCODONE HYDROCHLORIDE 30 MILLIGRAM(S): 5 TABLET ORAL at 06:40

## 2023-10-12 RX ADMIN — METHOCARBAMOL 500 MILLIGRAM(S): 500 TABLET, FILM COATED ORAL at 05:41

## 2023-10-12 RX ADMIN — OXYCODONE HYDROCHLORIDE 30 MILLIGRAM(S): 5 TABLET ORAL at 21:47

## 2023-10-12 RX ADMIN — Medication 50 MILLIGRAM(S): at 05:41

## 2023-10-12 RX ADMIN — ATORVASTATIN CALCIUM 40 MILLIGRAM(S): 80 TABLET, FILM COATED ORAL at 21:47

## 2023-10-12 RX ADMIN — Medication 50 MILLIGRAM(S): at 14:05

## 2023-10-12 RX ADMIN — Medication 500 MILLIGRAM(S): at 18:52

## 2023-10-12 RX ADMIN — GABAPENTIN 300 MILLIGRAM(S): 400 CAPSULE ORAL at 22:11

## 2023-10-12 RX ADMIN — OXYCODONE HYDROCHLORIDE 15 MILLIGRAM(S): 5 TABLET ORAL at 19:30

## 2023-10-12 RX ADMIN — OXYCODONE HYDROCHLORIDE 15 MILLIGRAM(S): 5 TABLET ORAL at 09:10

## 2023-10-12 RX ADMIN — OXYCODONE HYDROCHLORIDE 15 MILLIGRAM(S): 5 TABLET ORAL at 13:32

## 2023-10-12 RX ADMIN — OXYCODONE HYDROCHLORIDE 15 MILLIGRAM(S): 5 TABLET ORAL at 10:00

## 2023-10-12 NOTE — PROGRESS NOTE ADULT - SUBJECTIVE AND OBJECTIVE BOX
SUBJECTIVE: NAEON. This morning patient states she had her dressing changed because she noticed her sheets were stained. She feels her pain is better than yesterday but still significant. She had BM this morning after taking milk of magnesia last ngiht. NO other acute complaints.     MEDICATIONS:  MEDICATIONS  (STANDING):  acetaminophen     Tablet .. 975 milliGRAM(s) Oral every 8 hours  atorvastatin 40 milliGRAM(s) Oral at bedtime  enoxaparin Injectable 40 milliGRAM(s) SubCutaneous every 24 hours  hydrochlorothiazide 12.5 milliGRAM(s) Oral daily  hydroxychloroquine 200 milliGRAM(s) Oral daily  methocarbamol 500 milliGRAM(s) Oral every 8 hours  nicotine -  14 mG/24Hr(s) Patch 1 Patch Transdermal daily  ondansetron   Disintegrating Tablet 4 milliGRAM(s) Oral every 6 hours  oxyCODONE  ER Tablet 30 milliGRAM(s) Oral every 8 hours  polyethylene glycol 3350 17 Gram(s) Oral daily  predniSONE   Tablet 10 milliGRAM(s) Oral <User Schedule>  pregabalin 50 milliGRAM(s) Oral every 8 hours  senna 2 Tablet(s) Oral at bedtime  sodium chloride 0.9% Bolus 1000 milliLiter(s) IV Bolus once  sulfaSALAzine 500 milliGRAM(s) Oral two times a day    MEDICATIONS  (PRN):  naloxone Injectable 0.1 milliGRAM(s) IV Push every 3 minutes PRN For ANY of the following changes in patient status:  A. RR LESS THAN 10 breaths per minute, B. Oxygen saturation LESS THAN 90%, C. Sedation score of 6  ondansetron Injectable 4 milliGRAM(s) IV Push every 6 hours PRN Nausea and/or Vomiting  oxyCODONE    IR 10 milliGRAM(s) Oral every 4 hours PRN Moderate Pain (4 - 6)  oxyCODONE    IR 15 milliGRAM(s) Oral every 4 hours PRN Severe Pain (7 - 10)      Allergies    rituximab (Vomiting; Urticaria)    Intolerances        OBJECTIVE:  Vital Signs Last 24 Hrs  T(C): 36.6 (12 Oct 2023 09:25), Max: 36.8 (11 Oct 2023 20:54)  T(F): 97.8 (12 Oct 2023 09:25), Max: 98.3 (11 Oct 2023 20:54)  HR: 75 (12 Oct 2023 13:25) (56 - 76)  BP: 112/73 (12 Oct 2023 13:25) (99/56 - 128/72)  BP(mean): --  RR: 18 (12 Oct 2023 09:25) (17 - 18)  SpO2: 95% (12 Oct 2023 13:25) (94% - 97%)    Parameters below as of 12 Oct 2023 13:25  Patient On (Oxygen Delivery Method): room air      I&O's Summary    11 Oct 2023 07:01  -  12 Oct 2023 07:00  --------------------------------------------------------  IN: 500 mL / OUT: 600 mL / NET: -100 mL        PHYSICAL EXAM:  Gen: moderate distress 2/2 pain  HEENT: NCAT, MMM  Neck: supple  CV: RRR, no m/r/g, peripheral pulses 2+  Pulm: CTAB, no increased work of breathing, no rales/rhonchi  Abd: soft, ND, NT, no rebound or guarding  Skin: warm and dry  Ext: back dressing CDI  Neuro: AOx3, speaking in full sentences  Psych: affect and behavior appropriate, pleasant at time of interview    LABS:                        10.8   11.31 )-----------( 175      ( 12 Oct 2023 05:30 )             35.0     10-12    145  |  107  |  23  ----------------------------<  84  4.6   |  32<H>  |  1.09    Ca    9.4      12 Oct 2023 05:30          CAPILLARY BLOOD GLUCOSE        Urinalysis Basic - ( 12 Oct 2023 06:00 )    Color: Yellow / Appearance: Clear / S.025 / pH: x  Gluc: x / Ketone: NEGATIVE  / Bili: Negative / Urobili: 0.2 E.U./dL   Blood: x / Protein: NEGATIVE mg/dL / Nitrite: NEGATIVE   Leuk Esterase: NEGATIVE / RBC: x / WBC x   Sq Epi: x / Non Sq Epi: x / Bacteria: x        MICRODATA:    Urinalysis with Rflx Culture (collected 12 Oct 2023 06:00)        RADIOLOGY/OTHER STUDIES:

## 2023-10-12 NOTE — PROGRESS NOTE ADULT - SUBJECTIVE AND OBJECTIVE BOX
Ortho Note    Subjective:  Pt comfortable without complaints, pain controlled with current pain medication regimen   Denies CP, SOB, N/V, numbness/tingling, fever, chills  Reviewed plan of care with patient at bedside    Vital Signs Last 24 Hrs  T(C): 36.6 (10-12-23 @ 09:25), Max: 36.6 (10-12-23 @ 04:54)  T(F): 97.8 (10-12-23 @ 09:25), Max: 97.9 (10-12-23 @ 04:54)  HR: 70 (10-12-23 @ 09:25) (56 - 70)  BP: 99/56 (10-12-23 @ 09:25) (99/56 - 101/66)  BP(mean): --  RR: 18 (10-12-23 @ 09:25) (17 - 18)  SpO2: 96% (10-12-23 @ 09:25) (96% - 96%)  AVSS    Objective:    Physical Exam:  General: Pt Alert and oriented, NAD  Lumbar telfa and tegaderm DSG saturated, dressing changed today    Pulses: +2 DP bilateral lower extremities,   Sensation: silt intact bilateral lower extremities  Motor: EHL/FHL/TA/GS- 5/5 bilateral lower extremiteis        Plan of Care:  A/P: 63yFemale POD#3 s/p revision L2-L5 decompression with extension of instrumented fusion to L2   - afebrile  - Pain Control- Oxycodone 10-15mg PO Q4h prn moderate to severe pain, Oxycontin 30mg Q8h, tylenol 975mg PO Q8h, methocarbamol 500mg PO Q8h , lyrica 50mg PO Q8h   - DVT ppx: lovenox 40mg subq daily  - PT, WBS: WBAT  - ambulate with pt as tolerated  - appreciate pain management recs  - appreciate medicine recs   - standing xray 10-12  - provider notified patient bp 99/54, - hr  70s- nsr, asymptomatic, x1 liter normal saline bolus ordered  - Dispo- [facility] pending auth and acceptance,   standing xray       Ortho Pager [phone]

## 2023-10-12 NOTE — PROGRESS NOTE ADULT - SUBJECTIVE AND OBJECTIVE BOX
Ortho Note    Pt comfortable without complaints, pain controlled  Denies CP, SOB, N/V, numbness/tingling     Vital Signs Last 24 Hrs  T(C): 36.6 (10-12-23 @ 04:54), Max: 36.6 (10-12-23 @ 04:54)  T(F): 97.9 (10-12-23 @ 04:54), Max: 97.9 (10-12-23 @ 04:54)  HR: 56 (10-12-23 @ 04:54) (56 - 56)  BP: 101/66 (10-12-23 @ 04:54) (101/66 - 101/66)  BP(mean): --  RR: 17 (10-12-23 @ 04:54) (17 - 17)  SpO2: 96% (10-12-23 @ 04:54) (96% - 96%)  I&O's Summary    11 Oct 2023 07:01  -  12 Oct 2023 07:00  --------------------------------------------------------  IN: 500 mL / OUT: 600 mL / NET: -100 mL        AFVSS  General: Pt Alert and oriented, NAD  Prineo, telfa, tegadermDSG C/D/I  Pulses: 2+  Sensation: SILT  Motor: 5/5 EHL/FHL/TA/GS                          10.5   15.79 )-----------( 182      ( 11 Oct 2023 05:30 )             32.1     10-11    142  |  109<H>  |  19  ----------------------------<  107<H>  4.9   |  29  |  0.95    Ca    9.4      11 Oct 2023 05:30      A/P: 63Y F  s/p revision L2-L5, decompression with extension POD#3  Vitals: VSS  Overnight pain control, following pain mgmt recs  Motor/sensory: NVID  PT progress: GISELL  H/H - Follow up  Drains: HV –d/c'd  DVT ppx: LVX POD1  Mae: D/c   Glucose >180: No  Dispo: PT, drain output,       Takes oxycontin 30mg ER bid and oxycodone 30mg PO tid at home  Preliminary Pain management recs (Dr. Colbert):  PLEASE ORDER POST-OP  1.) Tylenol 975mg po q8h (standing)  2.) lyrica 50mg po q8h (standing)  3.) robaxin 500mg po q8h (standing)  4.) oxycontin ER 30mg q8h (standing)  5.) PCA dilaudid 0.2mg/ every 6 min/ max dose over 4h of 9mg  6.) dilaudid 1mg IV q3h prn severe breakthrough pain

## 2023-10-13 ENCOUNTER — TRANSCRIPTION ENCOUNTER (OUTPATIENT)
Age: 63
End: 2023-10-13

## 2023-10-13 VITALS
HEART RATE: 93 BPM | DIASTOLIC BLOOD PRESSURE: 67 MMHG | OXYGEN SATURATION: 98 % | RESPIRATION RATE: 18 BRPM | TEMPERATURE: 98 F | SYSTOLIC BLOOD PRESSURE: 134 MMHG

## 2023-10-13 LAB
ANION GAP SERPL CALC-SCNC: 7 MMOL/L — SIGNIFICANT CHANGE UP (ref 5–17)
BUN SERPL-MCNC: 20 MG/DL — SIGNIFICANT CHANGE UP (ref 7–23)
CALCIUM SERPL-MCNC: 9.3 MG/DL — SIGNIFICANT CHANGE UP (ref 8.4–10.5)
CHLORIDE SERPL-SCNC: 102 MMOL/L — SIGNIFICANT CHANGE UP (ref 96–108)
CO2 SERPL-SCNC: 30 MMOL/L — SIGNIFICANT CHANGE UP (ref 22–31)
CREAT SERPL-MCNC: 1 MG/DL — SIGNIFICANT CHANGE UP (ref 0.5–1.3)
EGFR: 63 ML/MIN/1.73M2 — SIGNIFICANT CHANGE UP
GLUCOSE SERPL-MCNC: 102 MG/DL — HIGH (ref 70–99)
HCT VFR BLD CALC: 33.7 % — LOW (ref 34.5–45)
HGB BLD-MCNC: 10.5 G/DL — LOW (ref 11.5–15.5)
MCHC RBC-ENTMCNC: 29.6 PG — SIGNIFICANT CHANGE UP (ref 27–34)
MCHC RBC-ENTMCNC: 31.2 GM/DL — LOW (ref 32–36)
MCV RBC AUTO: 94.9 FL — SIGNIFICANT CHANGE UP (ref 80–100)
NRBC # BLD: 0 /100 WBCS — SIGNIFICANT CHANGE UP (ref 0–0)
PLATELET # BLD AUTO: 174 K/UL — SIGNIFICANT CHANGE UP (ref 150–400)
POTASSIUM SERPL-MCNC: 5.2 MMOL/L — SIGNIFICANT CHANGE UP (ref 3.5–5.3)
POTASSIUM SERPL-SCNC: 5.2 MMOL/L — SIGNIFICANT CHANGE UP (ref 3.5–5.3)
RBC # BLD: 3.55 M/UL — LOW (ref 3.8–5.2)
RBC # FLD: 15 % — HIGH (ref 10.3–14.5)
SODIUM SERPL-SCNC: 139 MMOL/L — SIGNIFICANT CHANGE UP (ref 135–145)
WBC # BLD: 9.35 K/UL — SIGNIFICANT CHANGE UP (ref 3.8–10.5)
WBC # FLD AUTO: 9.35 K/UL — SIGNIFICANT CHANGE UP (ref 3.8–10.5)

## 2023-10-13 PROCEDURE — 81003 URINALYSIS AUTO W/O SCOPE: CPT

## 2023-10-13 PROCEDURE — C1713: CPT

## 2023-10-13 PROCEDURE — 80048 BASIC METABOLIC PNL TOTAL CA: CPT

## 2023-10-13 PROCEDURE — C1889: CPT

## 2023-10-13 PROCEDURE — 81001 URINALYSIS AUTO W/SCOPE: CPT

## 2023-10-13 PROCEDURE — 36415 COLL VENOUS BLD VENIPUNCTURE: CPT

## 2023-10-13 PROCEDURE — 97116 GAIT TRAINING THERAPY: CPT

## 2023-10-13 PROCEDURE — 97162 PT EVAL MOD COMPLEX 30 MIN: CPT

## 2023-10-13 PROCEDURE — 76000 FLUOROSCOPY <1 HR PHYS/QHP: CPT

## 2023-10-13 PROCEDURE — 85610 PROTHROMBIN TIME: CPT

## 2023-10-13 PROCEDURE — 85730 THROMBOPLASTIN TIME PARTIAL: CPT

## 2023-10-13 PROCEDURE — 72100 X-RAY EXAM L-S SPINE 2/3 VWS: CPT

## 2023-10-13 PROCEDURE — 99232 SBSQ HOSP IP/OBS MODERATE 35: CPT | Mod: GC

## 2023-10-13 PROCEDURE — 86850 RBC ANTIBODY SCREEN: CPT

## 2023-10-13 PROCEDURE — 85025 COMPLETE CBC W/AUTO DIFF WBC: CPT

## 2023-10-13 PROCEDURE — 85027 COMPLETE CBC AUTOMATED: CPT

## 2023-10-13 PROCEDURE — 86900 BLOOD TYPING SEROLOGIC ABO: CPT

## 2023-10-13 PROCEDURE — 86901 BLOOD TYPING SEROLOGIC RH(D): CPT

## 2023-10-13 RX ORDER — SULFASALAZINE 500 MG
1 TABLET ORAL
Refills: 0 | DISCHARGE

## 2023-10-13 RX ORDER — NICOTINE POLACRILEX 2 MG
1 GUM BUCCAL
Qty: 0 | Refills: 0 | DISCHARGE
Start: 2023-10-13

## 2023-10-13 RX ORDER — SULFASALAZINE 500 MG
1 TABLET ORAL
Qty: 0 | Refills: 0 | DISCHARGE
Start: 2023-10-13

## 2023-10-13 RX ORDER — SENNA PLUS 8.6 MG/1
2 TABLET ORAL
Qty: 0 | Refills: 0 | DISCHARGE
Start: 2023-10-13

## 2023-10-13 RX ORDER — OXYCODONE HYDROCHLORIDE 5 MG/1
1 TABLET ORAL
Qty: 0 | Refills: 0 | DISCHARGE
Start: 2023-10-13

## 2023-10-13 RX ORDER — ATORVASTATIN CALCIUM 80 MG/1
1 TABLET, FILM COATED ORAL
Qty: 0 | Refills: 0 | DISCHARGE
Start: 2023-10-13

## 2023-10-13 RX ORDER — HYDROXYCHLOROQUINE SULFATE 200 MG
1 TABLET ORAL
Qty: 0 | Refills: 0 | DISCHARGE
Start: 2023-10-13

## 2023-10-13 RX ORDER — HYDROCHLOROTHIAZIDE 25 MG
1 TABLET ORAL
Qty: 0 | Refills: 0 | DISCHARGE

## 2023-10-13 RX ORDER — DOCUSATE SODIUM 100 MG
1 CAPSULE ORAL
Qty: 0 | Refills: 0 | DISCHARGE

## 2023-10-13 RX ORDER — OXYCODONE HYDROCHLORIDE 5 MG/1
1 TABLET ORAL
Refills: 0 | DISCHARGE

## 2023-10-13 RX ORDER — ACETAMINOPHEN 500 MG
3 TABLET ORAL
Qty: 0 | Refills: 0 | DISCHARGE
Start: 2023-10-13

## 2023-10-13 RX ORDER — HYDROXYCHLOROQUINE SULFATE 200 MG
1 TABLET ORAL
Qty: 0 | Refills: 0 | DISCHARGE

## 2023-10-13 RX ORDER — ENOXAPARIN SODIUM 100 MG/ML
40 INJECTION SUBCUTANEOUS
Qty: 1 | Refills: 0
Start: 2023-10-13 | End: 2023-11-11

## 2023-10-13 RX ORDER — POLYETHYLENE GLYCOL 3350 17 G/17G
17 POWDER, FOR SOLUTION ORAL
Qty: 0 | Refills: 0 | DISCHARGE
Start: 2023-10-13

## 2023-10-13 RX ADMIN — OXYCODONE HYDROCHLORIDE 15 MILLIGRAM(S): 5 TABLET ORAL at 16:36

## 2023-10-13 RX ADMIN — Medication 500 MILLIGRAM(S): at 06:16

## 2023-10-13 RX ADMIN — OXYCODONE HYDROCHLORIDE 15 MILLIGRAM(S): 5 TABLET ORAL at 08:55

## 2023-10-13 RX ADMIN — OXYCODONE HYDROCHLORIDE 30 MILLIGRAM(S): 5 TABLET ORAL at 21:32

## 2023-10-13 RX ADMIN — OXYCODONE HYDROCHLORIDE 30 MILLIGRAM(S): 5 TABLET ORAL at 05:22

## 2023-10-13 RX ADMIN — OXYCODONE HYDROCHLORIDE 15 MILLIGRAM(S): 5 TABLET ORAL at 00:44

## 2023-10-13 RX ADMIN — Medication 200 MILLIGRAM(S): at 11:34

## 2023-10-13 RX ADMIN — ONDANSETRON 4 MILLIGRAM(S): 8 TABLET, FILM COATED ORAL at 05:22

## 2023-10-13 RX ADMIN — ATORVASTATIN CALCIUM 40 MILLIGRAM(S): 80 TABLET, FILM COATED ORAL at 21:33

## 2023-10-13 RX ADMIN — Medication 10 MILLIGRAM(S): at 09:51

## 2023-10-13 RX ADMIN — Medication 500 MILLIGRAM(S): at 18:04

## 2023-10-13 RX ADMIN — OXYCODONE HYDROCHLORIDE 15 MILLIGRAM(S): 5 TABLET ORAL at 17:36

## 2023-10-13 RX ADMIN — OXYCODONE HYDROCHLORIDE 15 MILLIGRAM(S): 5 TABLET ORAL at 20:38

## 2023-10-13 RX ADMIN — Medication 975 MILLIGRAM(S): at 05:22

## 2023-10-13 RX ADMIN — OXYCODONE HYDROCHLORIDE 15 MILLIGRAM(S): 5 TABLET ORAL at 09:55

## 2023-10-13 RX ADMIN — OXYCODONE HYDROCHLORIDE 15 MILLIGRAM(S): 5 TABLET ORAL at 21:21

## 2023-10-13 RX ADMIN — ENOXAPARIN SODIUM 40 MILLIGRAM(S): 100 INJECTION SUBCUTANEOUS at 21:32

## 2023-10-13 RX ADMIN — GABAPENTIN 300 MILLIGRAM(S): 400 CAPSULE ORAL at 05:22

## 2023-10-13 RX ADMIN — GABAPENTIN 300 MILLIGRAM(S): 400 CAPSULE ORAL at 21:32
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
## 2023-10-13 NOTE — PROGRESS NOTE ADULT - SUBJECTIVE AND OBJECTIVE BOX
O/N events: ARIS    SUBJECTIVE: Patient has no acute complaints. Pain is relatively well controlled. Denies SOB, CP. ROS is otherwise negative.       MEDICATIONS  (STANDING):  acetaminophen     Tablet .. 975 milliGRAM(s) Oral every 8 hours  atorvastatin 40 milliGRAM(s) Oral at bedtime  enoxaparin Injectable 40 milliGRAM(s) SubCutaneous every 24 hours  gabapentin 300 milliGRAM(s) Oral every 8 hours  hydrochlorothiazide 12.5 milliGRAM(s) Oral daily  hydroxychloroquine 200 milliGRAM(s) Oral daily  nicotine -  14 mG/24Hr(s) Patch 1 Patch Transdermal daily  ondansetron   Disintegrating Tablet 4 milliGRAM(s) Oral every 6 hours  oxyCODONE  ER Tablet 30 milliGRAM(s) Oral every 8 hours  polyethylene glycol 3350 17 Gram(s) Oral daily  predniSONE   Tablet 10 milliGRAM(s) Oral <User Schedule>  senna 2 Tablet(s) Oral at bedtime  sulfaSALAzine 500 milliGRAM(s) Oral two times a day    MEDICATIONS  (PRN):  naloxone Injectable 0.1 milliGRAM(s) IV Push every 3 minutes PRN For ANY of the following changes in patient status:  A. RR LESS THAN 10 breaths per minute, B. Oxygen saturation LESS THAN 90%, C. Sedation score of 6  ondansetron Injectable 4 milliGRAM(s) IV Push every 6 hours PRN Nausea and/or Vomiting  oxyCODONE    IR 15 milliGRAM(s) Oral every 4 hours PRN Severe Pain (7 - 10)  oxyCODONE    IR 10 milliGRAM(s) Oral every 4 hours PRN Moderate Pain (4 - 6)      Allergies    rituximab (Vomiting; Urticaria)    Intolerances        OBJECTIVE:    Vital Signs Last 24 Hrs  T(C): 37.3 (13 Oct 2023 16:50), Max: 37.3 (13 Oct 2023 16:50)  T(F): 99.2 (13 Oct 2023 16:50), Max: 99.2 (13 Oct 2023 16:50)  HR: 76 (13 Oct 2023 16:50) (66 - 83)  BP: 126/80 (13 Oct 2023 16:50) (104/53 - 126/80)  BP(mean): --  RR: 18 (13 Oct 2023 16:50) (16 - 18)  SpO2: 100% (13 Oct 2023 16:50) (93% - 100%)    Parameters below as of 13 Oct 2023 16:50  Patient On (Oxygen Delivery Method): room air            PHYSICAL EXAM:  Gen: moderate distress 2/2 pain  HEENT: NCAT, MMM  Neck: supple  CV: RRR, no m/r/g, peripheral pulses 2+  Pulm: CTAB, no increased work of breathing, no rales/rhonchi  Abd: soft, ND, NT, no rebound or guarding  Skin: warm and dry  Ext: back dressing CDI  Neuro: AOx3, speaking in full sentences  Psych: affect and behavior appropriate, pleasant at time of interview    LABS:                                   10.5   9.35  )-----------( 174      ( 13 Oct 2023 07:40 )             33.7   10    139  |  102  |  20  ----------------------------<  102<H>  5.2   |  30  |  1.00    Ca    9.3      13 Oct 2023 07:40                CAPILLARY BLOOD GLUCOSE        Urinalysis Basic - ( 12 Oct 2023 06:00 )    Color: Yellow / Appearance: Clear / S.025 / pH: x  Gluc: x / Ketone: NEGATIVE  / Bili: Negative / Urobili: 0.2 E.U./dL   Blood: x / Protein: NEGATIVE mg/dL / Nitrite: NEGATIVE   Leuk Esterase: NEGATIVE / RBC: x / WBC x   Sq Epi: x / Non Sq Epi: x / Bacteria: x        MICRODATA:    Urinalysis with Rflx Culture (collected 12 Oct 2023 06:00)        RADIOLOGY/OTHER STUDIES:

## 2023-10-13 NOTE — DISCHARGE NOTE NURSING/CASE MANAGEMENT/SOCIAL WORK - PATIENT PORTAL LINK FT
You can access the FollowMyHealth Patient Portal offered by [facility] by registering at the following website: [email or web address]. By joining [facility]’s FollowMyHealth portal, you will also be able to view your health information using other applications (apps) compatible with our system.

## 2023-10-13 NOTE — PROGRESS NOTE ADULT - SUBJECTIVE AND OBJECTIVE BOX
Ortho Note    Pt comfortable without complaints, pain controlled  Denies CP, SOB, N/V, numbness/tingling     Vital Signs Last 24 Hrs  T(C): 36.4 (10-13-23 @ 05:20), Max: 36.4 (10-13-23 @ 05:20)  T(F): 97.5 (10-13-23 @ 05:20), Max: 97.5 (10-13-23 @ 05:20)  HR: 68 (10-13-23 @ 06:15) (68 - 70)  BP: 121/70 (10-13-23 @ 06:15) (104/53 - 121/70)  BP(mean): --  RR: 18 (10-13-23 @ 06:15) (18 - 18)  SpO2: 98% (10-13-23 @ 06:15) (98% - 98%)  I&O's Summary    12 Oct 2023 07:01  -  13 Oct 2023 07:00  --------------------------------------------------------  IN: 0 mL / OUT: 200 mL / NET: -200 mL        AFVSS  General: Pt Alert and oriented, NAD  Prineo, telfa, tegadermDSG C/D/I  Pulses: 2+  Sensation: SILT  Motor: 5/5 EHL/FHL/TA/GS                          10.8   11.31 )-----------( 175      ( 12 Oct 2023 05:30 )             35.0     10-12    145  |  107  |  23  ----------------------------<  84  4.6   |  32<H>  |  1.09    Ca    9.4      12 Oct 2023 05:30        A/P: 63Y F  s/p revision L2-L5, decompression with extension POD#4  Vitals: VSS  Overnight pain control, following pain mgmt recs  Motor/sensory: NVID  PT progress: GISELL  H/H - Follow up  Drains: HV –d/c'd  DVT ppx: LVX POD1  Mae: D/c   Glucose >180: No  Dispo: GISELL      Takes oxycontin 30mg ER bid and oxycodone 30mg PO tid at home  Preliminary Pain management recs (Dr. Colbert):  PLEASE ORDER POST-OP  1.) Tylenol 975mg po q8h (standing)  2.) lyrica 50mg po q8h (standing)  3.) robaxin 500mg po q8h (standing)  4.) oxycontin ER 30mg q8h (standing)  5.) PCA dilaudid 0.2mg/ every 6 min/ max dose over 4h of 9mg  6.) dilaudid 1mg IV q3h prn severe breakthrough pain

## 2023-10-13 NOTE — PROGRESS NOTE ADULT - SUBJECTIVE AND OBJECTIVE BOX
Ortho Note    Subjective:  Pt comfortable without complaints, pain controlled with current pain medication regimen   Denies CP, SOB, N/V, numbness/tingling, fever, chills  Reviewed plan of care with patient at bedside      Vital Signs Last 24 Hrs  T(C): 36.8 (10-13-23 @ 08:53), Max: 36.8 (10-13-23 @ 08:53)  T(F): 98.2 (10-13-23 @ 08:53), Max: 98.2 (10-13-23 @ 08:53)  HR: 83 (10-13-23 @ 08:53) (83 - 83)  BP: 107/63 (10-13-23 @ 08:53) (107/63 - 107/63)  BP(mean): --  RR: 16 (10-13-23 @ 08:53) (16 - 16)  SpO2: 94% (10-13-23 @ 08:53) (94% - 94%)  AVSS    Objective:    Physical Exam:  General: Pt Alert and oriented, NAD  Lumbar telfa and tegaderm DSG saturated, dressing changed today  10-  Pulses: +2 DP bilateral lower extremities,   Sensation: silt intact bilateral lower extremities  Motor: EHL/FHL/TA/GS- 5/5 bilateral lower extremiteis        Plan of Care:  A/P: 63yFemale POD#4 s/p revision L2-L5 decompression with extension of instrumented fusion to L2   - afebrile  - Pain Control- Oxycodone 10-15mg PO Q4h prn moderate to severe pain, Oxycontin 30mg Q8h, tylenol 975mg PO Q8h, methocarbamol 500mg PO Q8h , lyrica 50mg PO Q8h   - DVT ppx: lovenox 40mg subq daily  - PT, WBS: WBAT  - ambulate with pt as tolerated  - appreciate pain management recs  - appreciate medicine recs   - standing xray 10-12 completed  - Dispo- [facility] pending auth and acceptance,     Ortho Pager [phone]

## 2023-10-13 NOTE — CONSULT NOTE ADULT - ASSESSMENT
63F with PMH of SLE, back pain, HTN and HLD presenting for L2-S1 decompression, pre-op clearance outpatient.      #Post-operative state  - plan per orthopedic team, including pain management, DVT ppx  - pain management has been consulted, appreciate recs  - patient notes that she has always been constipated, and milk of magnesia is the medication that works best for her.  Please add a bowel regimen with standing daily milk of magnesia, miralax and senna.    #SLE  - continue on plaquenil    #HTN  #HLD  - continue home medications    60 minutes spent on this encounter, including face to face with patient, care coordination and documentation.  Plan of care discussed with orthopedic team. 
63F with lupus, FAUSTO, HTN, HLD now s/p revision L2-S1 decompression with extension of instrumented fusion to L2 on 10/9/23. Her pain is controlled on hydromorphone IV PCA with 6mg give in past 24h. She has not yet been restarted on her home opioids. She takes oxycodone ER 30mg BID and oxycodone IR 30mg TID at home.    - start oxycodone ER 30mg q12h standing  - start oxycodone IR 5mg / 10mg q4h prn  - tylenol 975mg q8h  - methocarbamol 500mg q8h  - pregabalin 50mg q8h  - start ketorolac 30mg q8h x 3d if no surgical contraindications  - bowel / nausea ppx
63F with lupus, FAUSTO, HTN, HLD now s/p revision L2-S1 decompression with extension of instrumented fusion to L2 on 10/9/23. Her pain is controlled on hydromorphone IV PCA with 6mg give in past 24h. She has not yet been restarted on her home opioids. She takes oxycodone ER 30mg BID and oxycodone IR 30mg TID at home.    - oxycodone ER 30mg q8h standing  - oxycodone IR 10mg / 15mg q4h prn  - tylenol 975mg q8h  - methocarbamol 500mg q8h  - pregabalin 50mg q8h  - ketorolac 30mg q8h x 3d if no surgical contraindications  - bowel / nausea ppx
63F with lupus, FAUSTO, HTN, HLD now s/p revision L2-S1 decompression with extension of instrumented fusion to L2 on 10/9/23. Her pain is controlled on hydromorphone IV PCA with 6mg give in past 24h. She has not yet been restarted on her home opioids. She takes oxycodone ER 30mg BID and oxycodone IR 30mg TID at home. Pain well controlled on regimen.    - oxycodone ER 30mg q8h standing  - oxycodone IR 10mg / 15mg q4h prn  - tylenol 975mg q8h  - methocarbamol 500mg q8h  - pregabalin 50mg q8h  - ketorolac 30mg q8h x 3d if no surgical contraindications  - bowel / nausea ppx

## 2023-10-13 NOTE — CONSULT NOTE ADULT - SUBJECTIVE AND OBJECTIVE BOX
HPI:  The patient is a 63 year old female who presents for revision L2-S1 decompression with extension of instrumented fusion to L2. She has back pain despite conservative therapies for her symptoms. States has numbness to left thigh. Ambulates with cane or walker. States she sees a pain management physician, typically takes oxycodone 30mg three times daily.  Per Dr. Elias's outpatient note : she has low back pain that radiates to her legs and is limited in the distance she can walk due to pain; she has a history of two previous lumbar spine surgeries the most recent which was done by Dr. Park at [facility].     (06 Oct 2023 07:22)    Patient has taken oxycodone 15mg ER x2 in the past 24h.   She is on home oxycodone ER 30mg BID and oxycodone IR 30mg TID.   She has taken 4x oxycodone 15mg prns in the past 24h.   She was sitting on the bed and talking on her phone today.    PAST MEDICAL & SURGICAL HISTORY:  HTN (hypertension)      HLD (hyperlipidemia)      Obstructive sleep apnea      Systemic lupus      Arthritis      Back pain      History of lumbosacral spine surgery  x2      H/O hernia repair      History of appendectomy      H/O tubal ligation      H/O  section  x2      H/O carpal tunnel repair  B/L hand      H/O right knee surgery      History of bunionectomy  and reconstruction      History of arthroscopy of right shoulder      History of cholecystectomy      Gall stones      History of tonsillectomy          FAMILY HISTORY:      SOCIAL HISTORY:  [ ] Denies Smoking, Alcohol, or Drug Use    HOME MEDICATIONS:   Please refer to initial HNP    PAIN HOME MEDICATIONS:    Allergies    rituximab (Vomiting; Urticaria)    Intolerances        PAIN MEDICATIONS:  acetaminophen     Tablet .. 975 milliGRAM(s) Oral every 8 hours  HYDROmorphone  Injectable 1 milliGRAM(s) IV Push every 3 hours PRN  HYDROmorphone PCA (1 mG/mL) 30 milliLiter(s) PCA Continuous PCA Continuous  methocarbamol 500 milliGRAM(s) Oral every 8 hours  ondansetron   Disintegrating Tablet 4 milliGRAM(s) Oral every 6 hours  ondansetron Injectable 4 milliGRAM(s) IV Push every 6 hours PRN  oxyCODONE    IR 5 milliGRAM(s) Oral every 4 hours PRN  pregabalin 50 milliGRAM(s) Oral every 8 hours    Heme:  enoxaparin Injectable 40 milliGRAM(s) SubCutaneous every 24 hours    Antibiotics:    Cardiovascular:  hydrochlorothiazide 12.5 milliGRAM(s) Oral daily    GI:    Endocrine:  atorvastatin 40 milliGRAM(s) Oral at bedtime  dexAMETHasone  Injectable 6 milliGRAM(s) IV Push every 6 hours  predniSONE   Tablet 10 milliGRAM(s) Oral daily    All Other Medications:  naloxone Injectable 0.1 milliGRAM(s) IV Push every 3 minutes PRN      Vital Signs Last 24 Hrs  T(C): 36.7 (10 Oct 2023 08:26), Max: 36.7 (10 Oct 2023 05:55)  T(F): 98.1 (10 Oct 2023 08:26), Max: 98.1 (10 Oct 2023 08:26)  HR: 69 (10 Oct 2023 08:26) (55 - 78)  BP: 102/58 (10 Oct 2023 08:26) (102/58 - 161/89)  BP(mean): 121 (09 Oct 2023 21:15) (94 - 121)  RR: 19 (10 Oct 2023 08:26) (14 - 24)  SpO2: 97% (10 Oct 2023 08:26) (95% - 100%)    Parameters below as of 10 Oct 2023 08:26  Patient On (Oxygen Delivery Method): room air        LABS:                        10.9   11.12 )-----------( 188      ( 10 Oct 2023 05:30 )             34.1     1010    138  |  104  |  14  ----------------------------<  117<H>  4.7   |  26  |  0.75    Ca    9.0      10 Oct 2023 05:30      PT/INR - ( 09 Oct 2023 11:44 )   PT: 11.2 sec;   INR: 0.98          PTT - ( 09 Oct 2023 11:44 )  PTT:34.5 sec  Urinalysis Basic - ( 10 Oct 2023 05:30 )    Color: x / Appearance: x / SG: x / pH: x  Gluc: 117 mg/dL / Ketone: x  / Bili: x / Urobili: x   Blood: x / Protein: x / Nitrite: x   Leuk Esterase: x / RBC: x / WBC x   Sq Epi: x / Non Sq Epi: x / Bacteria: x    
HPI:  The patient is a 63 year old female who presents for revision L2-S1 decompression with extension of instrumented fusion to L2. She has back pain despite conservative therapies for her symptoms. States has numbness to left thigh. Ambulates with cane or walker. States she sees a pain management physician, typically takes oxycodone 30mg three times daily.  Per Dr. Elias's outpatient note : she has low back pain that radiates to her legs and is limited in the distance she can walk due to pain; she has a history of two previous lumbar spine surgeries the most recent which was done by Dr. Park at [facility].     (06 Oct 2023 07:22)    Patient took 1mg IV hydromorphone overnight and has had 4.7 mg IV hydromorphone via PCA since her surgery. She is on home oxycodone ER 30mg BID and oxycodone IR 30mg TID.   She reports her pain is manageable with the hydromorphone IV pca at the moment.     PAST MEDICAL & SURGICAL HISTORY:  HTN (hypertension)      HLD (hyperlipidemia)      Obstructive sleep apnea      Systemic lupus      Arthritis      Back pain      History of lumbosacral spine surgery  x2      H/O hernia repair      History of appendectomy      H/O tubal ligation      H/O  section  x2      H/O carpal tunnel repair  B/L hand      H/O right knee surgery      History of bunionectomy  and reconstruction      History of arthroscopy of right shoulder      History of cholecystectomy      Gall stones      History of tonsillectomy          FAMILY HISTORY:      SOCIAL HISTORY:  [ ] Denies Smoking, Alcohol, or Drug Use    HOME MEDICATIONS:   Please refer to initial HNP    PAIN HOME MEDICATIONS:    Allergies    rituximab (Vomiting; Urticaria)    Intolerances        PAIN MEDICATIONS:  acetaminophen     Tablet .. 975 milliGRAM(s) Oral every 8 hours  HYDROmorphone  Injectable 1 milliGRAM(s) IV Push every 3 hours PRN  HYDROmorphone PCA (1 mG/mL) 30 milliLiter(s) PCA Continuous PCA Continuous  methocarbamol 500 milliGRAM(s) Oral every 8 hours  ondansetron   Disintegrating Tablet 4 milliGRAM(s) Oral every 6 hours  ondansetron Injectable 4 milliGRAM(s) IV Push every 6 hours PRN  oxyCODONE    IR 5 milliGRAM(s) Oral every 4 hours PRN  pregabalin 50 milliGRAM(s) Oral every 8 hours    Heme:  enoxaparin Injectable 40 milliGRAM(s) SubCutaneous every 24 hours    Antibiotics:    Cardiovascular:  hydrochlorothiazide 12.5 milliGRAM(s) Oral daily    GI:    Endocrine:  atorvastatin 40 milliGRAM(s) Oral at bedtime  dexAMETHasone  Injectable 6 milliGRAM(s) IV Push every 6 hours  predniSONE   Tablet 10 milliGRAM(s) Oral daily    All Other Medications:  naloxone Injectable 0.1 milliGRAM(s) IV Push every 3 minutes PRN      Vital Signs Last 24 Hrs  T(C): 36.7 (10 Oct 2023 08:26), Max: 36.7 (10 Oct 2023 05:55)  T(F): 98.1 (10 Oct 2023 08:26), Max: 98.1 (10 Oct 2023 08:26)  HR: 69 (10 Oct 2023 08:26) (55 - 78)  BP: 102/58 (10 Oct 2023 08:26) (102/58 - 161/89)  BP(mean): 121 (09 Oct 2023 21:15) (94 - 121)  RR: 19 (10 Oct 2023 08:26) (14 - 24)  SpO2: 97% (10 Oct 2023 08:26) (95% - 100%)    Parameters below as of 10 Oct 2023 08:26  Patient On (Oxygen Delivery Method): room air        LABS:                        10.9   11.12 )-----------( 188      ( 10 Oct 2023 05:30 )             34.1     10-10    138  |  104  |  14  ----------------------------<  117<H>  4.7   |  26  |  0.75    Ca    9.0      10 Oct 2023 05:30      PT/INR - ( 09 Oct 2023 11:44 )   PT: 11.2 sec;   INR: 0.98          PTT - ( 09 Oct 2023 11:44 )  PTT:34.5 sec  Urinalysis Basic - ( 10 Oct 2023 05:30 )    Color: x / Appearance: x / SG: x / pH: x  Gluc: 117 mg/dL / Ketone: x  / Bili: x / Urobili: x   Blood: x / Protein: x / Nitrite: x   Leuk Esterase: x / RBC: x / WBC x   Sq Epi: x / Non Sq Epi: x / Bacteria: x    
See below for ortho HPI:   "The patient is a 63 year old female who presents for revision L2-S1 decompression with extension of instrumented fusion to L2. She has back pain despite conservative therapies for her symptoms. States has numbness to left thigh. Ambulates with cane or walker. States she sees a pain management physician, typically takes oxycodone 30mg three times daily.  Per Dr. Elias's outpatient note : she has low back pain that radiates to her legs and is limited in the distance she can walk due to pain; she has a history of two previous lumbar spine surgeries the most recent which was done by Dr. Park at [facility].          Review of Systems:  Review of Systems: MSK: + back pain, lumbosacral spine  Other Review of Systems: All other review of systems negative, except as noted in HPI      Allergies and Intolerances:        Allergies:  	rituximab: Drug, Vomiting, Urticaria    Home Medications:   * Patient Currently Takes Medications as of 28-Mar-2023 15:53 documented in Structured Notes  · 	aspirin 81 mg oral capsule: 1 cap(s) orally once a day Home medication  · 	Dilaudid 2 mg oral tablet: 1 tab(s) orally every 4 hours as needed for  severe pain MDD: 6  · 	cyclobenzaprine 5 mg oral tablet: 1 tab(s) orally 3 times a day as needed for Muscle Spasm MDD: 3  · 	pregabalin 50 mg oral capsule: 1 cap(s) orally 3 times a day MDD: 3  · 	acetaminophen 500 mg oral tablet: 2 tab(s) orally every 8 hours as needed for  mild pain  · 	atorvastatin 40 mg oral tablet: 1 tab(s) orally once  · 	senna leaf extract oral tablet: 2 tab(s) orally once a day (at bedtime)  · 	pantoprazole 40 mg oral delayed release tablet: 1 tab(s) orally once a day (before a meal)  · 	hydroCHLOROthiazide 12.5 mg oral capsule: 1 cap(s) orally once a day  · 	oxyCODONE 30 mg oral tablet, extended release: Last Dose Taken: 09-Oct-2023 AM, 1 tab(s) orally 2 times a day  · 	predniSONE 10 mg oral tablet: 1 tab(s) orally once a day  · 	sulfaSALAzine 500 mg oral tablet: 1 orally 2 times a day  · 	Plaquenil 200 mg oral tablet: 1 tab(s) orally once a day  · 	Colace 100 mg oral capsule: 1 cap(s) orally 2 times a day  · 	Chantix 1 mg oral tablet: 1 tab(s) orally 2 times a day  · 	oxycontin: Last Dose Taken: 08-Oct-2023 AM, 30 mg TID    Patient History:   Past Medical, Past Surgical, and Family History:  PAST MEDICAL HISTORY:  Arthritis     Back pain     HLD (hyperlipidemia)     HTN (hypertension)     Obstructive sleep apnea     Systemic lupus.     PAST SURGICAL HISTORY:  Gall stones     H/O carpal tunnel repair B/L hand    H/O  section x2    H/O hernia repair     H/O right knee surgery     H/O tubal ligation     History of appendectomy     History of arthroscopy of right shoulder     History of bunionectomy and reconstruction    History of cholecystectomy     History of lumbosacral spine surgery x2    History of tonsillectomy.    Social History:  · Substance use	No  · Social History (marital status, living situation, occupation, and sexual history)	per chart review former cigarette smoker    "  T(C): 36.7 (10-10-23 @ 05:55), Max: 36.7 (10-10-23 @ 05:55)  T(F): 98 (10-10-23 @ 05:55), Max: 98 (10-10-23 @ 05:55)  HR: 68 (10-10-23 @ 05:55) (68 - 68)  BP: 121/58 (10-10-23 @ 05:55) (121/58 - 121/58)  BP(mean): --  RR: 18 (10-10-23 @ 05:55) (18 - 18)  SpO2: 96% (10-10-23 @ 05:55) (96% - 96%)    Physical exam  General: no acute distress, sitting up in bed  HEENT: normocephalic, atraumatic, MMM  Cards: RRR, no mrg  Pulm: CTAB, no wheeze, crackles, rales or rhonchi  Ab: soft, nontender, nondistended, normoactive bowel sounds  Ext: wwp  
HPI:  The patient is a 63 year old female who presents for revision L2-S1 decompression with extension of instrumented fusion to L2. She has back pain despite conservative therapies for her symptoms. States has numbness to left thigh. Ambulates with cane or walker. States she sees a pain management physician, typically takes oxycodone 30mg three times daily.  Per Dr. Elias's outpatient note : she has low back pain that radiates to her legs and is limited in the distance she can walk due to pain; she has a history of two previous lumbar spine surgeries the most recent which was done by Dr. Park at [facility].     (06 Oct 2023 07:22)    Patient has taken oxycodone 15mg ER x2 in the past 24h.   She is on home oxycodone ER 30mg BID and oxycodone IR 30mg TID.   She was able to manage since the afternoon yesterday with no short acting PRNs but woke this morning with significant pain and requested 15mg PRN    PAST MEDICAL & SURGICAL HISTORY:  HTN (hypertension)      HLD (hyperlipidemia)      Obstructive sleep apnea      Systemic lupus      Arthritis      Back pain      History of lumbosacral spine surgery  x2      H/O hernia repair      History of appendectomy      H/O tubal ligation      H/O  section  x2      H/O carpal tunnel repair  B/L hand      H/O right knee surgery      History of bunionectomy  and reconstruction      History of arthroscopy of right shoulder      History of cholecystectomy      Gall stones      History of tonsillectomy          FAMILY HISTORY:      SOCIAL HISTORY:  [ ] Denies Smoking, Alcohol, or Drug Use    HOME MEDICATIONS:   Please refer to initial HNP    PAIN HOME MEDICATIONS:    Allergies    rituximab (Vomiting; Urticaria)    Intolerances        PAIN MEDICATIONS:  acetaminophen     Tablet .. 975 milliGRAM(s) Oral every 8 hours  HYDROmorphone  Injectable 1 milliGRAM(s) IV Push every 3 hours PRN  HYDROmorphone PCA (1 mG/mL) 30 milliLiter(s) PCA Continuous PCA Continuous  methocarbamol 500 milliGRAM(s) Oral every 8 hours  ondansetron   Disintegrating Tablet 4 milliGRAM(s) Oral every 6 hours  ondansetron Injectable 4 milliGRAM(s) IV Push every 6 hours PRN  oxyCODONE    IR 5 milliGRAM(s) Oral every 4 hours PRN  pregabalin 50 milliGRAM(s) Oral every 8 hours    Heme:  enoxaparin Injectable 40 milliGRAM(s) SubCutaneous every 24 hours    Antibiotics:    Cardiovascular:  hydrochlorothiazide 12.5 milliGRAM(s) Oral daily    GI:    Endocrine:  atorvastatin 40 milliGRAM(s) Oral at bedtime  dexAMETHasone  Injectable 6 milliGRAM(s) IV Push every 6 hours  predniSONE   Tablet 10 milliGRAM(s) Oral daily    All Other Medications:  naloxone Injectable 0.1 milliGRAM(s) IV Push every 3 minutes PRN      Vital Signs Last 24 Hrs  T(C): 36.7 (10 Oct 2023 08:26), Max: 36.7 (10 Oct 2023 05:55)  T(F): 98.1 (10 Oct 2023 08:26), Max: 98.1 (10 Oct 2023 08:26)  HR: 69 (10 Oct 2023 08:26) (55 - 78)  BP: 102/58 (10 Oct 2023 08:26) (102/58 - 161/89)  BP(mean): 121 (09 Oct 2023 21:15) (94 - 121)  RR: 19 (10 Oct 2023 08:26) (14 - 24)  SpO2: 97% (10 Oct 2023 08:26) (95% - 100%)    Parameters below as of 10 Oct 2023 08:26  Patient On (Oxygen Delivery Method): room air        LABS:                        10.9   11.12 )-----------( 188      ( 10 Oct 2023 05:30 )             34.1     1010    138  |  104  |  14  ----------------------------<  117<H>  4.7   |  26  |  0.75    Ca    9.0      10 Oct 2023 05:30      PT/INR - ( 09 Oct 2023 11:44 )   PT: 11.2 sec;   INR: 0.98          PTT - ( 09 Oct 2023 11:44 )  PTT:34.5 sec  Urinalysis Basic - ( 10 Oct 2023 05:30 )    Color: x / Appearance: x / SG: x / pH: x  Gluc: 117 mg/dL / Ketone: x  / Bili: x / Urobili: x   Blood: x / Protein: x / Nitrite: x   Leuk Esterase: x / RBC: x / WBC x   Sq Epi: x / Non Sq Epi: x / Bacteria: x

## 2023-10-13 NOTE — DISCHARGE NOTE NURSING/CASE MANAGEMENT/SOCIAL WORK - NSDCPEFALRISK_GEN_ALL_CORE
For information on Fall & Injury Prevention, visit: [email or web address] OR  [email or web address] OR  https://www.cdc.gov/steadi/patient.html

## 2023-10-13 NOTE — PROGRESS NOTE ADULT - PROVIDER SPECIALTY LIST ADULT
Hospitalist
Orthopedics
Orthopedics
Pain Medicine
Orthopedics
Orthopedics
Hospitalist
Orthopedics
Hospitalist

## 2023-10-13 NOTE — DISCHARGE NOTE NURSING/CASE MANAGEMENT/SOCIAL WORK - NSDPFAC_GEN_ALL_CORE
[facility] at [facility]/ [address] [place] [address]/ Phone: [phone]

## 2023-10-16 PROBLEM — M54.9 DORSALGIA, UNSPECIFIED: Chronic | Status: ACTIVE | Noted: 2023-10-06

## 2023-10-19 DIAGNOSIS — E78.5 HYPERLIPIDEMIA, UNSPECIFIED: ICD-10-CM

## 2023-10-19 DIAGNOSIS — Z90.49 ACQUIRED ABSENCE OF OTHER SPECIFIED PARTS OF DIGESTIVE TRACT: ICD-10-CM

## 2023-10-19 DIAGNOSIS — D72.828 OTHER ELEVATED WHITE BLOOD CELL COUNT: ICD-10-CM

## 2023-10-19 DIAGNOSIS — M54.16 RADICULOPATHY, LUMBAR REGION: ICD-10-CM

## 2023-10-19 DIAGNOSIS — M54.9 DORSALGIA, UNSPECIFIED: ICD-10-CM

## 2023-10-19 DIAGNOSIS — Z98.1 ARTHRODESIS STATUS: ICD-10-CM

## 2023-10-19 DIAGNOSIS — G47.33 OBSTRUCTIVE SLEEP APNEA (ADULT) (PEDIATRIC): ICD-10-CM

## 2023-10-19 DIAGNOSIS — I10 ESSENTIAL (PRIMARY) HYPERTENSION: ICD-10-CM

## 2023-10-19 DIAGNOSIS — I95.9 HYPOTENSION, UNSPECIFIED: ICD-10-CM

## 2023-10-19 DIAGNOSIS — M32.9 SYSTEMIC LUPUS ERYTHEMATOSUS, UNSPECIFIED: ICD-10-CM

## 2023-10-19 DIAGNOSIS — F17.210 NICOTINE DEPENDENCE, CIGARETTES, UNCOMPLICATED: ICD-10-CM

## 2023-10-19 DIAGNOSIS — M06.9 RHEUMATOID ARTHRITIS, UNSPECIFIED: ICD-10-CM

## 2023-10-19 DIAGNOSIS — M48.062 SPINAL STENOSIS, LUMBAR REGION WITH NEUROGENIC CLAUDICATION: ICD-10-CM

## 2023-10-24 ENCOUNTER — APPOINTMENT (OUTPATIENT)
Dept: ORTHOPEDIC SURGERY | Facility: CLINIC | Age: 63
End: 2023-10-24
Payer: MEDICARE

## 2023-10-24 PROCEDURE — 72100 X-RAY EXAM L-S SPINE 2/3 VWS: CPT

## 2023-10-24 PROCEDURE — 99024 POSTOP FOLLOW-UP VISIT: CPT

## 2023-10-24 RX ORDER — PREGABALIN 50 MG/1
50 CAPSULE ORAL
Qty: 90 | Refills: 0 | Status: ACTIVE | COMMUNITY
Start: 2023-10-24 | End: 1900-01-01

## 2023-10-26 ENCOUNTER — APPOINTMENT (OUTPATIENT)
Dept: RHEUMATOLOGY | Facility: CLINIC | Age: 63
End: 2023-10-26
Payer: MEDICARE

## 2023-10-26 PROCEDURE — 99442: CPT

## 2023-11-01 ENCOUNTER — NON-APPOINTMENT (OUTPATIENT)
Age: 63
End: 2023-11-01

## 2023-11-28 ENCOUNTER — APPOINTMENT (OUTPATIENT)
Dept: ORTHOPEDIC SURGERY | Facility: CLINIC | Age: 63
End: 2023-11-28
Payer: MEDICARE

## 2023-11-28 PROCEDURE — 99024 POSTOP FOLLOW-UP VISIT: CPT

## 2023-11-28 PROCEDURE — 72100 X-RAY EXAM L-S SPINE 2/3 VWS: CPT

## 2023-11-28 RX ORDER — GABAPENTIN 300 MG/1
300 CAPSULE ORAL 3 TIMES DAILY
Qty: 180 | Refills: 0 | Status: ACTIVE | COMMUNITY
Start: 2023-11-28 | End: 1900-01-01

## 2023-12-04 ENCOUNTER — RX RENEWAL (OUTPATIENT)
Age: 63
End: 2023-12-04

## 2023-12-04 RX ORDER — SULFASALAZINE 500 MG/1
500 TABLET ORAL
Qty: 180 | Refills: 1 | Status: ACTIVE | COMMUNITY
Start: 2020-05-18 | End: 1900-01-01

## 2023-12-10 ENCOUNTER — NON-APPOINTMENT (OUTPATIENT)
Age: 63
End: 2023-12-10

## 2024-01-09 ENCOUNTER — NON-APPOINTMENT (OUTPATIENT)
Age: 64
End: 2024-01-09

## 2024-01-15 ENCOUNTER — NON-APPOINTMENT (OUTPATIENT)
Age: 64
End: 2024-01-15

## 2024-01-16 ENCOUNTER — APPOINTMENT (OUTPATIENT)
Dept: ORTHOPEDIC SURGERY | Facility: CLINIC | Age: 64
End: 2024-01-16

## 2024-01-30 ENCOUNTER — APPOINTMENT (OUTPATIENT)
Dept: ORTHOPEDIC SURGERY | Facility: CLINIC | Age: 64
End: 2024-01-30
Payer: MEDICARE

## 2024-01-30 DIAGNOSIS — M54.9 DORSALGIA, UNSPECIFIED: ICD-10-CM

## 2024-01-30 DIAGNOSIS — G89.29 DORSALGIA, UNSPECIFIED: ICD-10-CM

## 2024-01-30 PROCEDURE — 99214 OFFICE O/P EST MOD 30 MIN: CPT

## 2024-01-30 NOTE — REASON FOR VISIT
[Follow-Up Visit] : a follow-up visit for [Back Pain] : back pain [FreeTextEntry2] : pain level 7/10, needs new physical therapy referral

## 2024-02-05 PROBLEM — M54.9 BACK PAIN, CHRONIC: Status: ACTIVE | Noted: 2020-05-03

## 2024-02-05 NOTE — ASSESSMENT
[FreeTextEntry1] : 63 year old female s/p revision L2-S1 decompression and PSIF (10/9/23). Patient reports continued low back pain. She denies recent illness, fevers, numbness, weakness, saddle anesthesia, urinary retention or fecal incontinence. I independently reviewed her lumbar MRI which shows post-op changes with decompression of neural elements.  I do not recommend revision spine surgery. We discussed treatment options including physical therapy, medications, spinal injections. The patient was given a referral for consideration for spinal injections.  Continue PT.  Continue gabapentin.  F/U in 2-3 months. We discussed red flag symptoms that would require emergent evaluation. She knows to call with any questions or concerns or if her symptoms acutely worsen.

## 2024-02-05 NOTE — PHYSICAL EXAM
[de-identified] : General: No acute distress, conversant, well-nourished. Head: Normocephalic, atraumatic Neck: trachea midline, FROM Heart: normotensive and normal rate and rhythm Lungs: No labored breathing Skin: No abrasions, no rashes, no edema Psych: Alert and oriented to person, place and time Extremities: no peripheral edema or digital cyanosis Vascular: warm and well perfused distally, palpable distal pulses  MSK: Lumbar spine: incision well healed  NEURO EXAM: Sensation Left L2 - 2/2 Left L3 - 2/2 Left L4 - 2/2 Left L5 - 2/2 Left S1 - 2/2  Right L2 - 2/2 Right L3 - 2/2 Right L4 - 2/2 Right L5 - 2/2 Right S1 - 2/2  Motor: Left L2 (hip flexion) 5/5 Left L3 (knee extension) 5/5 Left L4 (ankle dorsiflexion) 5/5 Left L5 (long toe extensor) 5/5 Left S1 (ankle plantar flexion) 5/5  Right L2 (hip flexion) 5/5 Right L3 (knee extension) 5/5 Right L4 (ankle dorsiflexion) 5/5 Right L5 (long toe extensor) 5/5 Right S1 (ankle plantar flexion) 5/5 [de-identified] : I independently reviewed her lumbar MRI which shows post-op changes with decompression of neural elements.    Compared to 8/2/2023, MRI of the lumbar spine 01/03/2024  Interval L2-3 laminectomy and extension of posterior instrumented fusion, now with L2-S1 posterior instrumented fusion.  Interval improvement in patency of the dural sac at L2-3.  Interval progression of now moderate spinal canal stenosis at L1-2.

## 2024-02-05 NOTE — HISTORY OF PRESENT ILLNESS
[de-identified] : 63 year old female s/p revision L2-S1 decompression and PSIF (10/9/23). Patient reports continued low back pain. She denies recent illness, fevers, numbness, weakness, saddle anesthesia, urinary retention or fecal incontinence. Here to review her lumbar MRI.

## 2024-02-06 ENCOUNTER — RX CHANGE (OUTPATIENT)
Age: 64
End: 2024-02-06

## 2024-02-06 ENCOUNTER — APPOINTMENT (OUTPATIENT)
Dept: RHEUMATOLOGY | Facility: CLINIC | Age: 64
End: 2024-02-06
Payer: MEDICARE

## 2024-02-06 ENCOUNTER — OUTPATIENT (OUTPATIENT)
Dept: OUTPATIENT SERVICES | Facility: HOSPITAL | Age: 64
LOS: 1 days | End: 2024-02-06
Payer: MEDICARE

## 2024-02-06 ENCOUNTER — APPOINTMENT (OUTPATIENT)
Dept: RADIOLOGY | Facility: CLINIC | Age: 64
End: 2024-02-06

## 2024-02-06 VITALS
HEIGHT: 58 IN | DIASTOLIC BLOOD PRESSURE: 89 MMHG | SYSTOLIC BLOOD PRESSURE: 138 MMHG | BODY MASS INDEX: 32.95 KG/M2 | WEIGHT: 157 LBS | HEART RATE: 71 BPM | TEMPERATURE: 97.7 F | OXYGEN SATURATION: 96 %

## 2024-02-06 DIAGNOSIS — Z98.890 OTHER SPECIFIED POSTPROCEDURAL STATES: Chronic | ICD-10-CM

## 2024-02-06 DIAGNOSIS — Z98.51 TUBAL LIGATION STATUS: Chronic | ICD-10-CM

## 2024-02-06 DIAGNOSIS — Z98.891 HISTORY OF UTERINE SCAR FROM PREVIOUS SURGERY: Chronic | ICD-10-CM

## 2024-02-06 DIAGNOSIS — Z90.49 ACQUIRED ABSENCE OF OTHER SPECIFIED PARTS OF DIGESTIVE TRACT: Chronic | ICD-10-CM

## 2024-02-06 PROCEDURE — 73120 X-RAY EXAM OF HAND: CPT | Mod: 26,LT,RT

## 2024-02-06 PROCEDURE — 99215 OFFICE O/P EST HI 40 MIN: CPT

## 2024-02-06 PROCEDURE — G2211 COMPLEX E/M VISIT ADD ON: CPT | Mod: NC,1L

## 2024-02-06 RX ORDER — FOLIC ACID 1 MG/1
1 TABLET ORAL DAILY
Qty: 90 | Refills: 3 | Status: ACTIVE | COMMUNITY
Start: 2024-02-06 | End: 1900-01-01

## 2024-02-06 NOTE — REVIEW OF SYSTEMS
[Negative] : Heme/Lymph [Eyesight Problems] : eyesight problems [Arthralgias] : arthralgias [Joint Stiffness] : joint stiffness [FreeTextEntry2] : fatigue [FreeTextEntry3] : vision changes [FreeTextEntry9] : pain in hands, right worse than left

## 2024-02-06 NOTE — HISTORY OF PRESENT ILLNESS
[FreeTextEntry1] : February 6, 2024 Patient returns for follow up of rheumatoid arthritis Patient would like to discuss change in medication.  Feels unable to tolerate sulfasalazine or hydroxychloroquine at this time. Reports worsening vision and concerned about hydroxychloroquine use, ophthalmology up-to-date. Two days ago, could not move hands due pain and swelling, right more than left, felt some relief with heat, improved with prednisone 10 mg Pain in right wrist at this time Reports vomiting after taking SSZ on 4 occasions, although not recently, no other Gi issues Patient is currently taking hydroxychloroquine 200 mg bid,  mg bid Tried Ozempic for about a month, no longer covered by insurance feels may have also contributed set of some GI symptoms Following with Dr. Elias from orthopedics, no further plans for surgery at this time  Patient will be evaluated by PCP tomorrow, will discuss plastic surgery referral for weight loss surgery to help with back pain Patient is right hand dominant Reviewed recent MRI 1/03/24 Discussed Otrexup or Rasuvo as alternative treatment Discussed change in regimen with patient, discussed restarting methotrexate, took in past as well, but injectable form at this time given increasing GI symptoms.  October 26, 2023 Patient returns for follow-up via telephonic visit. Patient status post spinal fusion surgery 10/9/2023 at [facility].  Due to pain, did not feel able to travel to appointment given recent surgery and recovery. Patient is recovering from home, as per chart completed, L1-L2 posterior lateral arthrodesis.  L2-L3 laminectomy with bilateral partial facetectomies and foraminotomies. Revision posterior instrumented fusion L2 S1.  Use of stereotactic computer-assisted navigation for placement of pedicle screws.  Use of morselized allograft.  Local autograft.  Bone marrow aspirate through a separate incision. Patient started PT at home, will complete for 3 weeks at home, then transition to PT outside of the home. Patient overall feeling well, but does report pain, following with spine surgery. Patient is requesting refills for medications, otherwise joints feel well.. Will place refill for hydroxychloroquine and sulfasalazine today. Patient will follow-up when able for in person visit.

## 2024-02-06 NOTE — ASSESSMENT
[FreeTextEntry1] : 63 year old woman with history of seronegative RA, osteoarthritis, chronic neck and back pain, returns for follow up.  Patient reports back pain, status post spinal surgery 10/9/2023 at [facility], completed physical therapy.  Pain management as per spine surgery. Patient is also status post cervical fusion in March 2023, with improvement in symptoms following surgery.  Patient continues hydroxychloroquine 200 mg twice daily and sulfasalazine 500 mg twice daily reports occasional GI issues with SSZ.  Patient reports flare of joint symptoms for the past 2 to 3 days, most predominant in the bilateral hands right worse than left, improved with prednisone 10 mg daily.  Discussed treatment options with patient, patient concerned about hydroxychloroquine given duration of treatment and risk of retinopathy, ophthalmology up-to-date, patient would like to discontinue as well as sulfasalazine as unclear if absorbing medication given episodes of vomiting.  Discussed injectable treatment such as methotrexate, will start Otrexup 10 mg weekly, increasing as tolerated, discussed need for blood test monitoring while taking methotrexate.  Patient is right-hand dominant and is unable to manually draw from vial given decreased dexterity due to active arthritis, will benefit from prefilled pens such as Otrexup or Rasuvo. Continues to follow-up with pain management as well as spine. Ophthalmology up-to-date for Plaquenil monitoring. Blood work will be updated in office today, including CBC, CMP, CRP, CCP, Hep B and C ab, RF, ESR.  Will order x-ray of the bilateral hands and wrists today.  Patient will follow up in 6 weeks or sooner as needed.         prior EKG from PMD (11/21)/the EKG is unchanged from prior EKG

## 2024-02-06 NOTE — ADDENDUM
[FreeTextEntry1] : I, Miguel Donaldson, documented this note as a scribe on behalf of Dr. Allyn Miner MD on 02/06/2024.

## 2024-02-06 NOTE — PHYSICAL EXAM
[General Appearance - Alert] : alert [General Appearance - In No Acute Distress] : in no acute distress [General Appearance - Well Nourished] : well nourished [General Appearance - Well Developed] : well developed [General Appearance - Well-Appearing] : healthy appearing [Sclera] : the sclera and conjunctiva were normal [Respiration, Rhythm And Depth] : normal respiratory rhythm and effort [Exaggerated Use Of Accessory Muscles For Inspiration] : no accessory muscle use [Edema] : there was no peripheral edema [] : no rash [Oriented To Time, Place, And Person] : oriented to person, place, and time [Impaired Insight] : insight and judgment were intact [Affect] : the affect was normal [Mood] : the mood was normal [FreeTextEntry1] : Ambulates with cane.  Right hand with MCP subluxation, edema over the second through fourth MCP with decreased handgrip due to pain.  Minimal edema over the right wrist as well without tenderness.  Good range of motion of the right wrist.  Left hand with MCP enlargement and subluxation, nontender, good handgrip bilaterally.  Scattered DIP enlargement without tenderness.

## 2024-02-06 NOTE — END OF VISIT
[FreeTextEntry3] : All medical record entries made by the Scribe were at my, Dr. Allyn Miner MD, direction and personally dictated by me on 02/06/2024. I have reviewed the chart and agree that the record accurately reflects my personal performance of the history, physical exam, assessment and plan. I have also personally directed, reviewed, and agreed with the chart. [Time Spent: ___ minutes] : I have spent [unfilled] minutes of time on the encounter.

## 2024-02-07 LAB
ALBUMIN SERPL ELPH-MCNC: 4.2 G/DL
ALP BLD-CCNC: 168 U/L
ALT SERPL-CCNC: 44 U/L
ANION GAP SERPL CALC-SCNC: 9 MMOL/L
AST SERPL-CCNC: 29 U/L
BASOPHILS # BLD AUTO: 0.04 K/UL
BASOPHILS NFR BLD AUTO: 0.4 %
BILIRUB SERPL-MCNC: 0.3 MG/DL
BUN SERPL-MCNC: 13 MG/DL
CALCIUM SERPL-MCNC: 9.6 MG/DL
CCP AB SER IA-ACNC: <8 UNITS
CHLORIDE SERPL-SCNC: 107 MMOL/L
CO2 SERPL-SCNC: 26 MMOL/L
CREAT SERPL-MCNC: 0.76 MG/DL
CRP SERPL-MCNC: <3 MG/L
EGFR: 87 ML/MIN/1.73M2
EOSINOPHIL # BLD AUTO: 0.12 K/UL
EOSINOPHIL NFR BLD AUTO: 1.3 %
ERYTHROCYTE [SEDIMENTATION RATE] IN BLOOD BY WESTERGREN METHOD: 9 MM/HR
GLUCOSE SERPL-MCNC: 78 MG/DL
HBV CORE IGG+IGM SER QL: NONREACTIVE
HBV SURFACE AB SER QL: ABNORMAL
HBV SURFACE AG SER QL: NONREACTIVE
HCT VFR BLD CALC: 38 %
HCV AB SER QL: NONREACTIVE
HCV S/CO RATIO: 0.11 S/CO
HGB BLD-MCNC: 12 G/DL
IMM GRANULOCYTES NFR BLD AUTO: 0.4 %
LYMPHOCYTES # BLD AUTO: 2.63 K/UL
LYMPHOCYTES NFR BLD AUTO: 29.5 %
MAN DIFF?: NORMAL
MCHC RBC-ENTMCNC: 28.7 PG
MCHC RBC-ENTMCNC: 31.6 GM/DL
MCV RBC AUTO: 90.9 FL
MONOCYTES # BLD AUTO: 0.72 K/UL
MONOCYTES NFR BLD AUTO: 8.1 %
NEUTROPHILS # BLD AUTO: 5.38 K/UL
NEUTROPHILS NFR BLD AUTO: 60.3 %
PLATELET # BLD AUTO: 202 K/UL
POTASSIUM SERPL-SCNC: 4.5 MMOL/L
PROT SERPL-MCNC: 6.9 G/DL
RBC # BLD: 4.18 M/UL
RBC # FLD: 16.1 %
RF+CCP IGG SER-IMP: NEGATIVE
RHEUMATOID FACT SER QL: <10 IU/ML
SODIUM SERPL-SCNC: 142 MMOL/L
WBC # FLD AUTO: 8.93 K/UL

## 2024-02-28 ENCOUNTER — RX RENEWAL (OUTPATIENT)
Age: 64
End: 2024-02-28

## 2024-02-28 RX ORDER — HYDROXYCHLOROQUINE SULFATE 200 MG/1
200 TABLET, FILM COATED ORAL TWICE DAILY
Qty: 180 | Refills: 1 | Status: ACTIVE | COMMUNITY
Start: 2020-05-12 | End: 1900-01-01

## 2024-03-01 ENCOUNTER — NON-APPOINTMENT (OUTPATIENT)
Age: 64
End: 2024-03-01

## 2024-03-11 ENCOUNTER — APPOINTMENT (OUTPATIENT)
Dept: RHEUMATOLOGY | Facility: CLINIC | Age: 64
End: 2024-03-11
Payer: MEDICARE

## 2024-03-14 ENCOUNTER — APPOINTMENT (OUTPATIENT)
Dept: RHEUMATOLOGY | Facility: CLINIC | Age: 64
End: 2024-03-14
Payer: MEDICARE

## 2024-03-14 VITALS
SYSTOLIC BLOOD PRESSURE: 129 MMHG | TEMPERATURE: 97.1 F | HEIGHT: 58 IN | HEART RATE: 64 BPM | OXYGEN SATURATION: 96 % | DIASTOLIC BLOOD PRESSURE: 81 MMHG | WEIGHT: 151 LBS | BODY MASS INDEX: 31.7 KG/M2

## 2024-03-14 DIAGNOSIS — M79.641 PAIN IN RIGHT HAND: ICD-10-CM

## 2024-03-14 DIAGNOSIS — M25.50 PAIN IN UNSPECIFIED JOINT: ICD-10-CM

## 2024-03-14 DIAGNOSIS — M79.642 PAIN IN RIGHT HAND: ICD-10-CM

## 2024-03-14 DIAGNOSIS — M06.9 RHEUMATOID ARTHRITIS, UNSPECIFIED: ICD-10-CM

## 2024-03-14 PROCEDURE — 99214 OFFICE O/P EST MOD 30 MIN: CPT

## 2024-03-14 PROCEDURE — G2211 COMPLEX E/M VISIT ADD ON: CPT | Mod: NC,1L

## 2024-03-14 NOTE — HISTORY OF PRESENT ILLNESS
[FreeTextEntry1] : March 11, 2024 Patient returns for follow up of rheumatoid arthritis  February 6, 2024 Patient returns for follow up of rheumatoid arthritis Patient would like to discuss change in medication.  Feels unable to tolerate sulfasalazine or hydroxychloroquine at this time. Reports worsening vision and concerned about hydroxychloroquine use, ophthalmology up-to-date. Two days ago, could not move hands due pain and swelling, right more than left, felt some relief with heat, improved with prednisone 10 mg Pain in right wrist at this time Reports vomiting after taking SSZ on 4 occasions, although not recently, no other Gi issues Patient is currently taking hydroxychloroquine 200 mg bid,  mg bid Tried Ozempic for about a month, no longer covered by insurance feels may have also contributed set of some GI symptoms Following with Dr. Elias from orthopedics, no further plans for surgery at this time  Patient will be evaluated by PCP tomorrow, will discuss plastic surgery referral for weight loss surgery to help with back pain Patient is right hand dominant Reviewed recent MRI 1/03/24 Discussed Otrexup or Rasuvo as alternative treatment Discussed change in regimen with patient, discussed restarting methotrexate, took in past as well, but injectable form at this time given increasing GI symptoms.  October 26, 2023 Patient returns for follow-up via telephonic visit. Patient status post spinal fusion surgery 10/9/2023 at [facility].  Due to pain, did not feel able to travel to appointment given recent surgery and recovery. Patient is recovering from home, as per chart completed, L1-L2 posterior lateral arthrodesis.  L2-L3 laminectomy with bilateral partial facetectomies and foraminotomies. Revision posterior instrumented fusion L2 S1.  Use of stereotactic computer-assisted navigation for placement of pedicle screws.  Use of morselized allograft.  Local autograft.  Bone marrow aspirate through a separate incision. Patient started PT at home, will complete for 3 weeks at home, then transition to PT outside of the home. Patient overall feeling well, but does report pain, following with spine surgery. Patient is requesting refills for medications, otherwise joints feel well.. Will place refill for hydroxychloroquine and sulfasalazine today. Patient will follow-up when able for in person visit.

## 2024-03-14 NOTE — ADDENDUM
[FreeTextEntry1] : I, Miguel Donaldson, documented this note as a scribe on behalf of Dr. Allyn Miner MD on 03/11/2024.

## 2024-03-14 NOTE — END OF VISIT
[FreeTextEntry3] : All medical record entries made by the Scribe were at my, Dr. Allyn Miner MD, direction and personally dictated by me on 03/11/2024. I have reviewed the chart and agree that the record accurately reflects my personal performance of the history, physical exam, assessment and plan. I have also personally directed, reviewed, and agreed with the chart.

## 2024-03-14 NOTE — HISTORY OF PRESENT ILLNESS
[FreeTextEntry1] : March 14, 2024 Patient returns for follow-up of rheumatoid arthritis Since last visit, started Otrexup 10 mg weekly, started to feel benefit after third dose. No side effects noted Hands are less edematous than previous Overall feeling better Was seen by pain management, had steroid injection in the right second MCP with benefit Improved handgrip of the bilateral hands Will increase Otrexup to 15 mg weekly. Will obtain labs today in office. Reviewed previous labs and x-rays with patient.  February 6, 2024 Patient returns for follow up of rheumatoid arthritis Patient would like to discuss change in medication.  Feels unable to tolerate sulfasalazine or hydroxychloroquine at this time. Reports worsening vision and concerned about hydroxychloroquine use, ophthalmology up-to-date. Two days ago, could not move hands due pain and swelling, right more than left, felt some relief with heat, improved with prednisone 10 mg Pain in right wrist at this time Reports vomiting after taking SSZ on 4 occasions, although not recently, no other Gi issues Patient is currently taking hydroxychloroquine 200 mg bid,  mg bid Tried Ozempic for about a month, no longer covered by insurance feels may have also contributed set of some GI symptoms Following with Dr. Elias from orthopedics, no further plans for surgery at this time  Patient will be evaluated by PCP tomorrow, will discuss plastic surgery referral for weight loss surgery to help with back pain Patient is right hand dominant Reviewed recent MRI 1/03/24 Discussed Otrexup or Rasuvo as alternative treatment Discussed change in regimen with patient, discussed restarting methotrexate, took in past as well, but injectable form at this time given increasing GI symptoms.  October 26, 2023 Patient returns for follow-up via telephonic visit. Patient status post spinal fusion surgery 10/9/2023 at [facility].  Due to pain, did not feel able to travel to appointment given recent surgery and recovery. Patient is recovering from home, as per chart completed, L1-L2 posterior lateral arthrodesis.  L2-L3 laminectomy with bilateral partial facetectomies and foraminotomies. Revision posterior instrumented fusion L2 S1.  Use of stereotactic computer-assisted navigation for placement of pedicle screws.  Use of morselized allograft.  Local autograft.  Bone marrow aspirate through a separate incision. Patient started PT at home, will complete for 3 weeks at home, then transition to PT outside of the home. Patient overall feeling well, but does report pain, following with spine surgery. Patient is requesting refills for medications, otherwise joints feel well.. Will place refill for hydroxychloroquine and sulfasalazine today. Patient will follow-up when able for in person visit.

## 2024-03-14 NOTE — ASSESSMENT
[FreeTextEntry1] : 64 year old woman with history of seronegative RA, osteoarthritis, chronic neck and back pain, returns for follow up.  Patient reports back pain, status post spinal surgery 10/9/2023 at [facility], completed physical therapy. Pain management as per spine surgery. Patient is also status post cervical fusion in March 2023, with improvement in symptoms following surgery. Patient continues hydroxychloroquine 200 mg twice daily and sulfasalazine 500 mg twice daily reports occasional GI issues with SSZ. Patient reports flare of joint symptoms for the past 2 to 3 days, most predominant in the bilateral hands right worse than left, improved with prednisone 10 mg daily. Discussed treatment options with patient, patient concerned about hydroxychloroquine given duration of treatment and risk of retinopathy, ophthalmology up-to-date, patient would like to discontinue as well as sulfasalazine as unclear if absorbing medication given episodes of vomiting. Discussed injectable treatment such as methotrexate, will start Otrexup 10 mg weekly, increasing as tolerated, discussed need for blood test monitoring while taking methotrexate. Patient is right-hand dominant and is unable to manually draw from vial given decreased dexterity due to active arthritis, will benefit from prefilled pens such as Otrexup or Rasuvo. Continues to follow-up with pain management as well as spine. Ophthalmology up-to-date for Plaquenil monitoring. Blood work will be updated in office today, including CBC, CMP, CRP, CCP, Hep B and C ab, RF, ESR. Will order x-ray of the bilateral hands and wrists today. Patient will follow up in 6 weeks or sooner as needed.

## 2024-03-14 NOTE — PHYSICAL EXAM
[General Appearance - Alert] : alert [General Appearance - In No Acute Distress] : in no acute distress [General Appearance - Well-Appearing] : healthy appearing [Sclera] : the sclera and conjunctiva were normal [] : no respiratory distress [Respiration, Rhythm And Depth] : normal respiratory rhythm and effort [Exaggerated Use Of Accessory Muscles For Inspiration] : no accessory muscle use [Abnormal Walk] : normal gait [Oriented To Time, Place, And Person] : oriented to person, place, and time [Affect] : the affect was normal [Impaired Insight] : insight and judgment were intact [Mood] : the mood was normal [FreeTextEntry1] : Improved MCP edema, improved handgrip bilaterally.

## 2024-03-14 NOTE — ASSESSMENT
[FreeTextEntry1] : 63 year old woman with history of seronegative RA, osteoarthritis, chronic neck and back pain, returns for follow up.  Since last visit, patient started Otrexup 10 mg weekly with folic acid 1 mg daily as unclear if patient was absorbing sulfasalazine given GI issues.  Patient feels improvement in symptoms following third dose of Otrexup, will increase Otrexup to 15 mg weekly pending lab results today.  Patient continues to follow with pain management as well. Patient reports back pain, status post spinal surgery 10/9/2023 at [facility], completed physical therapy.  Pain management as per spine surgery. Patient is also status post cervical fusion in March 2023, with improvement in symptoms following surgery.  Patient is right-hand dominant and is unable to manually draw from vial given decreased dexterity due to active arthritis, will benefit from prefilled pens of Otrexup. Continues to follow-up with pain management as well as spine. Ophthalmology up-to-date for Plaquenil monitoring. Blood work will be updated in office today, including CBC, CMP, CRP and ESR.  X-rays of the hands reviewed with patient.  Patient will follow up in 6 weeks or sooner as needed.

## 2024-03-14 NOTE — PHYSICAL EXAM
[General Appearance - Alert] : alert [General Appearance - Well Nourished] : well nourished [General Appearance - In No Acute Distress] : in no acute distress [General Appearance - Well-Appearing] : healthy appearing [General Appearance - Well Developed] : well developed [Sclera] : the sclera and conjunctiva were normal [Respiration, Rhythm And Depth] : normal respiratory rhythm and effort [Exaggerated Use Of Accessory Muscles For Inspiration] : no accessory muscle use [Edema] : there was no peripheral edema [] : no rash [Oriented To Time, Place, And Person] : oriented to person, place, and time [Impaired Insight] : insight and judgment were intact [Affect] : the affect was normal [Mood] : the mood was normal [FreeTextEntry1] : Ambulates with cane.  Right hand with MCP subluxation, edema over the second through fourth MCP with decreased handgrip due to pain.  Minimal edema over the right wrist as well without tenderness.  Good range of motion of the right wrist.  Left hand with MCP enlargement and subluxation, nontender, good handgrip bilaterally.  Scattered DIP enlargement without tenderness.

## 2024-03-15 LAB
ALBUMIN SERPL ELPH-MCNC: 4.6 G/DL
ALP BLD-CCNC: 161 U/L
ALT SERPL-CCNC: 43 U/L
ANION GAP SERPL CALC-SCNC: 14 MMOL/L
AST SERPL-CCNC: 29 U/L
BASOPHILS # BLD AUTO: 0.03 K/UL
BASOPHILS NFR BLD AUTO: 0.4 %
BILIRUB SERPL-MCNC: 0.6 MG/DL
BUN SERPL-MCNC: 15 MG/DL
CALCIUM SERPL-MCNC: 9.8 MG/DL
CHLORIDE SERPL-SCNC: 105 MMOL/L
CO2 SERPL-SCNC: 25 MMOL/L
CREAT SERPL-MCNC: 0.84 MG/DL
CRP SERPL-MCNC: <3 MG/L
EGFR: 77 ML/MIN/1.73M2
EOSINOPHIL # BLD AUTO: 0.06 K/UL
EOSINOPHIL NFR BLD AUTO: 0.8 %
ERYTHROCYTE [SEDIMENTATION RATE] IN BLOOD BY WESTERGREN METHOD: 12 MM/HR
GLUCOSE SERPL-MCNC: 93 MG/DL
HCT VFR BLD CALC: 39.4 %
HGB BLD-MCNC: 12.4 G/DL
IMM GRANULOCYTES NFR BLD AUTO: 0.3 %
LYMPHOCYTES # BLD AUTO: 2.6 K/UL
LYMPHOCYTES NFR BLD AUTO: 33.5 %
MAN DIFF?: NORMAL
MCHC RBC-ENTMCNC: 29.7 PG
MCHC RBC-ENTMCNC: 31.5 GM/DL
MCV RBC AUTO: 94.5 FL
MONOCYTES # BLD AUTO: 0.53 K/UL
MONOCYTES NFR BLD AUTO: 6.8 %
NEUTROPHILS # BLD AUTO: 4.53 K/UL
NEUTROPHILS NFR BLD AUTO: 58.2 %
PLATELET # BLD AUTO: 235 K/UL
POTASSIUM SERPL-SCNC: 4.6 MMOL/L
PROT SERPL-MCNC: 7 G/DL
RBC # BLD: 4.17 M/UL
RBC # FLD: 17.7 %
SODIUM SERPL-SCNC: 144 MMOL/L
WBC # FLD AUTO: 7.77 K/UL

## 2024-03-19 ENCOUNTER — TRANSCRIPTION ENCOUNTER (OUTPATIENT)
Age: 64
End: 2024-03-19

## 2024-03-20 ENCOUNTER — APPOINTMENT (OUTPATIENT)
Dept: ORTHOPEDIC SURGERY | Facility: CLINIC | Age: 64
End: 2024-03-20
Payer: MEDICARE

## 2024-03-20 DIAGNOSIS — G89.29 LOW BACK PAIN, UNSPECIFIED: ICD-10-CM

## 2024-03-20 DIAGNOSIS — M54.50 LOW BACK PAIN, UNSPECIFIED: ICD-10-CM

## 2024-03-20 DIAGNOSIS — Z98.890 OTHER SPECIFIED POSTPROCEDURAL STATES: ICD-10-CM

## 2024-03-20 PROCEDURE — 72100 X-RAY EXAM L-S SPINE 2/3 VWS: CPT

## 2024-03-20 PROCEDURE — 99214 OFFICE O/P EST MOD 30 MIN: CPT | Mod: 25

## 2024-03-20 NOTE — PHYSICAL EXAM
[de-identified] : General: No acute distress, conversant, well-nourished. Head: Normocephalic, atraumatic Neck: trachea midline, FROM Heart: normotensive and normal rate and rhythm Lungs: No labored breathing Skin: No abrasions, no rashes, no edema Psych: Alert and oriented to person, place and time Extremities: no peripheral edema or digital cyanosis Vascular: warm and well perfused distally, palpable distal pulses  MSK: Lumbar spine: incision well healed  NEURO EXAM: Sensation Left L2 - 2/2 Left L3 - 2/2 Left L4 - 2/2 Left L5 - 2/2 Left S1 - 2/2  Right L2 - 2/2 Right L3 - 2/2 Right L4 - 2/2 Right L5 - 2/2 Right S1 - 2/2  Motor: Left L2 (hip flexion) 5/5 Left L3 (knee extension) 5/5 Left L4 (ankle dorsiflexion) 5/5 Left L5 (long toe extensor) 5/5 Left S1 (ankle plantar flexion) 5/5  Right L2 (hip flexion) 5/5 Right L3 (knee extension) 5/5 Right L4 (ankle dorsiflexion) 5/5 Right L5 (long toe extensor) 5/5 Right S1 (ankle plantar flexion) 5/5 [de-identified] :  I ordered radiographs to evaluate the patient's symptoms. Lumbar 4 view radiographs taken in the office today show s/p revision L2-S1 decompression and PSIF. hardware in appropriate position and alignment. There are no Wet Read(s) to document.

## 2024-03-20 NOTE — HISTORY OF PRESENT ILLNESS
[de-identified] : 64 year old female s/p revision L2-S1 decompression and PSIF (10/9/23).  She denies recent illness, fevers, numbness, weakness, saddle anesthesia, urinary retention or fecal incontinence. Recently she noted a soft tissue laceration near her lumbar wound.  It was tender.  She had been applying strong creams to her back.  No drainage. No fevers.

## 2024-03-20 NOTE — ASSESSMENT
[FreeTextEntry1] : 64 year old female s/p revision L2-S1 decompression and PSIF (10/9/23).  She denies recent illness, fevers, numbness, weakness, saddle anesthesia, urinary retention or fecal incontinence. Recently she noted a soft tissue laceration near her lumbar wound.  It was tender.  She had been applying strong creams to her back.  No drainage. No fevers.  It is superficial. She was given wound care instructions.  She was given a referral to a wound care specialist. F/U in 3-4 weeks. We discussed red flag symptoms that would require emergent evaluation. She knows to call with any questions or concerns or if her symptoms acutely worsen.

## 2024-04-17 ENCOUNTER — APPOINTMENT (OUTPATIENT)
Dept: ORTHOPEDIC SURGERY | Facility: CLINIC | Age: 64
End: 2024-04-17

## 2024-04-19 ENCOUNTER — NON-APPOINTMENT (OUTPATIENT)
Age: 64
End: 2024-04-19

## 2024-04-19 ENCOUNTER — APPOINTMENT (OUTPATIENT)
Dept: RHEUMATOLOGY | Facility: CLINIC | Age: 64
End: 2024-04-19

## 2024-05-21 ENCOUNTER — RX RENEWAL (OUTPATIENT)
Age: 64
End: 2024-05-21

## 2024-05-21 RX ORDER — METHOTREXATE 15 MG/.4ML
15 INJECTION, SOLUTION SUBCUTANEOUS
Qty: 1.6 | Refills: 0 | Status: ACTIVE | COMMUNITY
Start: 2024-02-06 | End: 1900-01-01

## 2024-06-18 ENCOUNTER — APPOINTMENT (OUTPATIENT)
Dept: RHEUMATOLOGY | Facility: CLINIC | Age: 64
End: 2024-06-18

## 2024-08-09 ENCOUNTER — NON-APPOINTMENT (OUTPATIENT)
Age: 64
End: 2024-08-09

## 2024-08-19 ENCOUNTER — NON-APPOINTMENT (OUTPATIENT)
Age: 64
End: 2024-08-19

## 2024-08-29 NOTE — PRE-ANESTHESIA EVALUATION ADULT - BMI (KG/M2)
Detail Level: Detailed Add 85846 Cpt? (Important Note: In 2017 The Use Of 05016 Is Being Tracked By Cms To Determine Future Global Period Reimbursement For Global Periods): yes 32

## 2024-09-11 ENCOUNTER — APPOINTMENT (OUTPATIENT)
Dept: RHEUMATOLOGY | Facility: CLINIC | Age: 64
End: 2024-09-11
Payer: MEDICARE

## 2024-09-11 VITALS
HEIGHT: 58 IN | SYSTOLIC BLOOD PRESSURE: 124 MMHG | OXYGEN SATURATION: 94 % | WEIGHT: 164 LBS | DIASTOLIC BLOOD PRESSURE: 80 MMHG | TEMPERATURE: 97.4 F | HEART RATE: 70 BPM | BODY MASS INDEX: 34.43 KG/M2

## 2024-09-11 DIAGNOSIS — M65.30 TRIGGER FINGER, UNSPECIFIED FINGER: ICD-10-CM

## 2024-09-11 DIAGNOSIS — M79.642 PAIN IN RIGHT HAND: ICD-10-CM

## 2024-09-11 DIAGNOSIS — H53.9 UNSPECIFIED VISUAL DISTURBANCE: ICD-10-CM

## 2024-09-11 DIAGNOSIS — M25.50 PAIN IN UNSPECIFIED JOINT: ICD-10-CM

## 2024-09-11 DIAGNOSIS — M06.9 RHEUMATOID ARTHRITIS, UNSPECIFIED: ICD-10-CM

## 2024-09-11 DIAGNOSIS — M79.641 PAIN IN RIGHT HAND: ICD-10-CM

## 2024-09-11 PROCEDURE — 99215 OFFICE O/P EST HI 40 MIN: CPT

## 2024-09-11 PROCEDURE — G2211 COMPLEX E/M VISIT ADD ON: CPT

## 2024-09-11 NOTE — HISTORY OF PRESENT ILLNESS
[FreeTextEntry1] : September 11, 2024 Patient reutrns for follow up of joint pain Since last visit, patient feels increase hand pain symptoms, Continues Otrexup 15 mg every week Patient feels increase in trigger finger symptoms, left third finger as well as right finger, had previous trigger finger surgeries, but not on these digits. Has more difficulty with flexion of the fingers with gripping no sticking of the fingers Initially felt benefit with Otrexup now with some increasing joint pain and stiffness Discussed restarting sulfasalazine will restart 500 mg twice daily as tolerated Patient is concerned about decreasing vision in the right eye, was seen by optometrist, received new glasses, but already feels as though vision changed, requesting ophthalmology referral, placed today. Reviewed labs on patient's phone, ESR and CRP in the normal range in August 2024.  CBC and chemistry reviewed. Patient with FHx of glaucoma  Patient following with GI for bile duct changes, recommended MRI every 6 months for monitoring No other joints painful or swollen. No side effects from Otrexup noted  March 14, 2024 Patient returns for follow-up of rheumatoid arthritis Since last visit, started Otrexup 10 mg weekly, started to feel benefit after third dose. No side effects noted Hands are less edematous than previous Overall feeling better Was seen by pain management, had steroid injection in the right second MCP with benefit Improved handgrip of the bilateral hands Will increase Otrexup to 15 mg weekly. Will obtain labs today in office. Reviewed previous labs and x-rays with patient.  February 6, 2024 Patient returns for follow up of rheumatoid arthritis Patient would like to discuss change in medication.  Feels unable to tolerate sulfasalazine or hydroxychloroquine at this time. Reports worsening vision and concerned about hydroxychloroquine use, ophthalmology up-to-date. Two days ago, could not move hands due pain and swelling, right more than left, felt some relief with heat, improved with prednisone 10 mg Pain in right wrist at this time Reports vomiting after taking SSZ on 4 occasions, although not recently, no other Gi issues Patient is currently taking hydroxychloroquine 200 mg bid,  mg bid Tried Ozempic for about a month, no longer covered by insurance feels may have also contributed set of some GI symptoms Following with Dr. Elias from orthopedics, no further plans for surgery at this time  Patient will be evaluated by PCP tomorrow, will discuss plastic surgery referral for weight loss surgery to help with back pain Patient is right hand dominant Reviewed recent MRI 1/03/24 Discussed Otrexup or Rasuvo as alternative treatment Discussed change in regimen with patient, discussed restarting methotrexate, took in past as well, but injectable form at this time given increasing GI symptoms.  October 26, 2023 Patient returns for follow-up via telephonic visit. Patient status post spinal fusion surgery 10/9/2023 at [facility].  Due to pain, did not feel able to travel to appointment given recent surgery and recovery. Patient is recovering from home, as per chart completed, L1-L2 posterior lateral arthrodesis.  L2-L3 laminectomy with bilateral partial facetectomies and foraminotomies. Revision posterior instrumented fusion L2 S1.  Use of stereotactic computer-assisted navigation for placement of pedicle screws.  Use of morselized allograft.  Local autograft.  Bone marrow aspirate through a separate incision. Patient started PT at home, will complete for 3 weeks at home, then transition to PT outside of the home. Patient overall feeling well, but does report pain, following with spine surgery. Patient is requesting refills for medications, otherwise joints feel well.. Will place refill for hydroxychloroquine and sulfasalazine today. Patient will follow-up when able for in person visit.

## 2024-09-11 NOTE — HISTORY OF PRESENT ILLNESS
[FreeTextEntry1] : September 11, 2024 Patient reutrns for follow up of joint pain Since last visit, patient feels increase hand pain symptoms, Continues Otrexup 15 mg every week Patient feels increase in trigger finger symptoms, left third finger as well as right finger, had previous trigger finger surgeries, but not on these digits. Has more difficulty with flexion of the fingers with gripping no sticking of the fingers Initially felt benefit with Otrexup now with some increasing joint pain and stiffness Discussed restarting sulfasalazine will restart 500 mg twice daily as tolerated Patient is concerned about decreasing vision in the right eye, was seen by optometrist, received new glasses, but already feels as though vision changed, requesting ophthalmology referral, placed today. Reviewed labs on patient's phone, ESR and CRP in the normal range in August 2024.  CBC and chemistry reviewed. Patient with FHx of glaucoma  Patient following with GI for bile duct changes, recommended MRI every 6 months for monitoring No other joints painful or swollen. No side effects from Otrexup noted  March 14, 2024 Patient returns for follow-up of rheumatoid arthritis Since last visit, started Otrexup 10 mg weekly, started to feel benefit after third dose. No side effects noted Hands are less edematous than previous Overall feeling better Was seen by pain management, had steroid injection in the right second MCP with benefit Improved handgrip of the bilateral hands Will increase Otrexup to 15 mg weekly. Will obtain labs today in office. Reviewed previous labs and x-rays with patient.  February 6, 2024 Patient returns for follow up of rheumatoid arthritis Patient would like to discuss change in medication.  Feels unable to tolerate sulfasalazine or hydroxychloroquine at this time. Reports worsening vision and concerned about hydroxychloroquine use, ophthalmology up-to-date. Two days ago, could not move hands due pain and swelling, right more than left, felt some relief with heat, improved with prednisone 10 mg Pain in right wrist at this time Reports vomiting after taking SSZ on 4 occasions, although not recently, no other Gi issues Patient is currently taking hydroxychloroquine 200 mg bid,  mg bid Tried Ozempic for about a month, no longer covered by insurance feels may have also contributed set of some GI symptoms Following with Dr. Elias from orthopedics, no further plans for surgery at this time  Patient will be evaluated by PCP tomorrow, will discuss plastic surgery referral for weight loss surgery to help with back pain Patient is right hand dominant Reviewed recent MRI 1/03/24 Discussed Otrexup or Rasuvo as alternative treatment Discussed change in regimen with patient, discussed restarting methotrexate, took in past as well, but injectable form at this time given increasing GI symptoms.  October 26, 2023 Patient returns for follow-up via telephonic visit. Patient status post spinal fusion surgery 10/9/2023 at [facility].  Due to pain, did not feel able to travel to appointment given recent surgery and recovery. Patient is recovering from home, as per chart completed, L1-L2 posterior lateral arthrodesis.  L2-L3 laminectomy with bilateral partial facetectomies and foraminotomies. Revision posterior instrumented fusion L2 S1.  Use of stereotactic computer-assisted navigation for placement of pedicle screws.  Use of morselized allograft.  Local autograft.  Bone marrow aspirate through a separate incision. Patient started PT at home, will complete for 3 weeks at home, then transition to PT outside of the home. Patient overall feeling well, but does report pain, following with spine surgery. Patient is requesting refills for medications, otherwise joints feel well.. Will place refill for hydroxychloroquine and sulfasalazine today. Patient will follow-up when able for in person visit.

## 2024-09-11 NOTE — DATA REVIEWED
[FreeTextEntry1] : August 19, 2024 Bloodwork reviewed on patient portal on phone. CRP- 0.3 ESR- 13  LFT- normal except L class 149  CBC- normal

## 2024-09-11 NOTE — REVIEW OF SYSTEMS
[Eyesight Problems] : eyesight problems [Joint Pain] : joint pain [Joint Stiffness] : joint stiffness [Negative] : Heme/Lymph [FreeTextEntry9] : hand joint pain

## 2024-09-11 NOTE — PHYSICAL EXAM
[General Appearance - Alert] : alert [General Appearance - In No Acute Distress] : in no acute distress [Oriented To Time, Place, And Person] : oriented to person, place, and time [Impaired Insight] : insight and judgment were intact [Affect] : the affect was normal [General Appearance - Well-Appearing] : healthy appearing [] : no respiratory distress [Respiration, Rhythm And Depth] : normal respiratory rhythm and effort [Exaggerated Use Of Accessory Muscles For Inspiration] : no accessory muscle use [FreeTextEntry1] : ambulates with cane

## 2024-09-11 NOTE — ADDENDUM
[FreeTextEntry1] :  I, Christi Orozco, acted solely as a scribe for Dr. Allyn Miner, direction and personally dictated by me on 09/11/2024. I have reviewed the chart and agree that the record accurately reflects my personal performance of the history, physical exam, assessment, and plan. I have also personally directed, reviewed, and agreed with the chart.

## 2024-09-11 NOTE — ASSESSMENT
[FreeTextEntry1] : 64 year old woman with history of seronegative RA, osteoarthritis, chronic neck and back pain, returns for follow up.  Since last visit, patient continues Otrexup 15 mg weekly with folic acid 1 mg daily.  Patient initially with improvement with Otrexup, since last visit, patient does not feel helping as much as previous.  Patient also with probable trigger fingers of the bilateral hands with decreased handgrip, will refer to hand orthopedist for further evaluation.  Patient with history of trigger fingers previously, as well as surgery for trigger fingers as well.  Patient no longer taking hydroxychloroquine, will restart sulfasalazine 500 mg daily increasing to twice daily as tolerated, if unable to tolerate due to GI side effects, will discuss additional injectable medications given GI intolerance. Patient continues to follow with pain management as well. Patient reports back pain, status post spinal surgery 10/9/2023 at [facility], completed physical therapy.  Pain management as per spine surgery. Patient is also status post cervical fusion in March 2023, with improvement in symptoms following surgery.  Patient is right-hand dominant and is unable to manually draw from vial given decreased dexterity due to active arthritis, will benefit from prefilled pens of Otrexup. Continues following up with GI at [facility], will undergo MRI of the abdomen every 6 months.  Patient will follow up in 2 months or sooner as needed.

## 2024-09-11 NOTE — ASSESSMENT
[FreeTextEntry1] : 64 year old woman with history of seronegative RA, osteoarthritis, chronic neck and back pain, returns for follow up.  Since last visit, patient continues Otrexup 15 mg weekly with folic acid 1 mg daily.  Patient initially with improvement with Otrexup, since last visit, patient does not feel helping as much as previous.  Patient also with probable trigger fingers of the bilateral hands with decreased handgrip, will refer to hand orthopedist for further evaluation.  Patient with history of trigger fingers previously, as well as surgery for trigger fingers as well.  Patient no longer taking hydroxychloroquine, will restart sulfasalazine 500 mg daily increasing to twice daily as tolerated, if unable to tolerate due to GI side effects, will discuss additional injectable medications given GI intolerance. Patient continues to follow with pain management as well. Patient reports back pain, status post spinal surgery 10/9/2023 at [facility], completed physical therapy.  Pain management as per spine surgery. Patient is also status post cervical fusion in March 2023, with improvement in symptoms following surgery.  Patient is right-hand dominant and is unable to manually draw from vial given decreased dexterity due to active arthritis, will benefit from prefilled pens of Otrexup. Continues following up with GI at [facility], will undergo MRI of the abdomen every 6 months.  Patient will follow up in 2 months or sooner as needed.

## 2024-10-30 ENCOUNTER — RX RENEWAL (OUTPATIENT)
Age: 64
End: 2024-10-30

## 2024-11-14 ENCOUNTER — RX RENEWAL (OUTPATIENT)
Age: 64
End: 2024-11-14

## 2024-12-09 ENCOUNTER — APPOINTMENT (OUTPATIENT)
Dept: OPHTHALMOLOGY | Facility: CLINIC | Age: 64
End: 2024-12-09
Payer: MEDICARE

## 2024-12-09 ENCOUNTER — NON-APPOINTMENT (OUTPATIENT)
Age: 64
End: 2024-12-09

## 2024-12-09 PROCEDURE — 92004 COMPRE OPH EXAM NEW PT 1/>: CPT | Mod: 25

## 2024-12-09 PROCEDURE — 92134 CPTRZ OPH DX IMG PST SGM RTA: CPT

## 2024-12-11 ENCOUNTER — RX RENEWAL (OUTPATIENT)
Age: 64
End: 2024-12-11

## 2024-12-18 ENCOUNTER — APPOINTMENT (OUTPATIENT)
Dept: RHEUMATOLOGY | Facility: CLINIC | Age: 64
End: 2024-12-18

## 2024-12-23 ENCOUNTER — APPOINTMENT (OUTPATIENT)
Dept: OPHTHALMOLOGY | Facility: CLINIC | Age: 64
End: 2024-12-23

## 2024-12-27 ENCOUNTER — NON-APPOINTMENT (OUTPATIENT)
Age: 64
End: 2024-12-27

## 2024-12-27 ENCOUNTER — APPOINTMENT (OUTPATIENT)
Dept: OPHTHALMOLOGY | Facility: CLINIC | Age: 64
End: 2024-12-27
Payer: MEDICARE

## 2024-12-27 PROCEDURE — 92014 COMPRE OPH EXAM EST PT 1/>: CPT | Mod: 25

## 2024-12-27 PROCEDURE — 92250 FUNDUS PHOTOGRAPHY W/I&R: CPT

## 2025-01-02 ENCOUNTER — APPOINTMENT (OUTPATIENT)
Dept: OPHTHALMOLOGY | Facility: CLINIC | Age: 65
End: 2025-01-02
Payer: MEDICARE

## 2025-01-02 ENCOUNTER — NON-APPOINTMENT (OUTPATIENT)
Age: 65
End: 2025-01-02

## 2025-01-02 PROCEDURE — 92012 INTRM OPH EXAM EST PATIENT: CPT | Mod: 25

## 2025-01-02 PROCEDURE — 92136 OPHTHALMIC BIOMETRY: CPT

## 2025-01-10 DIAGNOSIS — M06.9 RHEUMATOID ARTHRITIS, UNSPECIFIED: ICD-10-CM

## 2025-01-15 NOTE — OCCUPATIONAL THERAPY INITIAL EVALUATION ADULT - NS ASR WT BEARING DETAIL RUE
CT abdomen pelvis shows right inguinal hernia.  Please proceed with appointment with general surgery as we discussed. weight-bearing as tolerated

## 2025-01-30 ENCOUNTER — APPOINTMENT (OUTPATIENT)
Dept: ORTHOPEDIC SURGERY | Facility: CLINIC | Age: 65
End: 2025-01-30
Payer: MEDICARE

## 2025-01-30 DIAGNOSIS — M48.062 SPINAL STENOSIS, LUMBAR REGION WITH NEUROGENIC CLAUDICATION: ICD-10-CM

## 2025-01-30 DIAGNOSIS — M54.12 RADICULOPATHY, CERVICAL REGION: ICD-10-CM

## 2025-01-30 DIAGNOSIS — G95.20 UNSPECIFIED CORD COMPRESSION: ICD-10-CM

## 2025-01-30 PROCEDURE — 99214 OFFICE O/P EST MOD 30 MIN: CPT | Mod: 25

## 2025-01-30 PROCEDURE — 72110 X-RAY EXAM L-2 SPINE 4/>VWS: CPT

## 2025-01-30 RX ORDER — GABAPENTIN 400 MG/1
400 CAPSULE ORAL 3 TIMES DAILY
Qty: 90 | Refills: 1 | Status: ACTIVE | COMMUNITY
Start: 2025-01-30 | End: 1900-01-01

## 2025-02-03 ENCOUNTER — NON-APPOINTMENT (OUTPATIENT)
Age: 65
End: 2025-02-03

## 2025-02-03 ENCOUNTER — APPOINTMENT (OUTPATIENT)
Dept: RHEUMATOLOGY | Facility: CLINIC | Age: 65
End: 2025-02-03
Payer: MEDICARE

## 2025-02-03 VITALS
DIASTOLIC BLOOD PRESSURE: 82 MMHG | WEIGHT: 165 LBS | HEIGHT: 58 IN | SYSTOLIC BLOOD PRESSURE: 130 MMHG | OXYGEN SATURATION: 95 % | BODY MASS INDEX: 34.63 KG/M2 | TEMPERATURE: 97.8 F | HEART RATE: 68 BPM

## 2025-02-03 DIAGNOSIS — M06.9 RHEUMATOID ARTHRITIS, UNSPECIFIED: ICD-10-CM

## 2025-02-03 DIAGNOSIS — M79.641 PAIN IN RIGHT HAND: ICD-10-CM

## 2025-02-03 DIAGNOSIS — G89.29 DORSALGIA, UNSPECIFIED: ICD-10-CM

## 2025-02-03 DIAGNOSIS — M25.50 PAIN IN UNSPECIFIED JOINT: ICD-10-CM

## 2025-02-03 DIAGNOSIS — M54.9 DORSALGIA, UNSPECIFIED: ICD-10-CM

## 2025-02-03 DIAGNOSIS — M79.642 PAIN IN RIGHT HAND: ICD-10-CM

## 2025-02-03 PROCEDURE — 99214 OFFICE O/P EST MOD 30 MIN: CPT | Mod: 25

## 2025-02-04 LAB
ALBUMIN SERPL ELPH-MCNC: 4.1 G/DL
ALP BLD-CCNC: 182 U/L
ALT SERPL-CCNC: 47 U/L
ANION GAP SERPL CALC-SCNC: 9 MMOL/L
AST SERPL-CCNC: 43 U/L
BASOPHILS # BLD AUTO: 0.04 K/UL
BASOPHILS NFR BLD AUTO: 0.4 %
BILIRUB SERPL-MCNC: 0.4 MG/DL
BUN SERPL-MCNC: 11 MG/DL
CALCIUM SERPL-MCNC: 9.6 MG/DL
CHLORIDE SERPL-SCNC: 101 MMOL/L
CO2 SERPL-SCNC: 28 MMOL/L
CREAT SERPL-MCNC: 0.67 MG/DL
CRP SERPL-MCNC: <3 MG/L
EGFR: 97 ML/MIN/1.73M2
EOSINOPHIL # BLD AUTO: 0.11 K/UL
EOSINOPHIL NFR BLD AUTO: 1.1 %
ERYTHROCYTE [SEDIMENTATION RATE] IN BLOOD BY WESTERGREN METHOD: 14 MM/HR
GLUCOSE SERPL-MCNC: 51 MG/DL
HCT VFR BLD CALC: 40.7 %
HGB BLD-MCNC: 13.2 G/DL
IMM GRANULOCYTES NFR BLD AUTO: 0.2 %
LYMPHOCYTES # BLD AUTO: 4.59 K/UL
LYMPHOCYTES NFR BLD AUTO: 47.3 %
MAN DIFF?: NORMAL
MCHC RBC-ENTMCNC: 31.6 PG
MCHC RBC-ENTMCNC: 32.4 G/DL
MCV RBC AUTO: 97.4 FL
MONOCYTES # BLD AUTO: 0.74 K/UL
MONOCYTES NFR BLD AUTO: 7.6 %
NEUTROPHILS # BLD AUTO: 4.2 K/UL
NEUTROPHILS NFR BLD AUTO: 43.4 %
PLATELET # BLD AUTO: 210 K/UL
POTASSIUM SERPL-SCNC: 4 MMOL/L
PROT SERPL-MCNC: 6.9 G/DL
RBC # BLD: 4.18 M/UL
RBC # FLD: 15.7 %
SODIUM SERPL-SCNC: 138 MMOL/L
WBC # FLD AUTO: 9.7 K/UL

## 2025-02-06 LAB
M TB IFN-G BLD-IMP: NEGATIVE
QUANTIFERON TB PLUS MITOGEN MINUS NIL: >10 IU/ML
QUANTIFERON TB PLUS NIL: 0.03 IU/ML
QUANTIFERON TB PLUS TB1 MINUS NIL: 0 IU/ML
QUANTIFERON TB PLUS TB2 MINUS NIL: 0 IU/ML

## 2025-02-10 RX ORDER — ADALIMUMAB 40MG/0.4ML
40 KIT SUBCUTANEOUS
Qty: 1 | Refills: 2 | Status: ACTIVE | COMMUNITY
Start: 2025-02-06

## 2025-02-11 ENCOUNTER — APPOINTMENT (OUTPATIENT)
Dept: OPHTHALMOLOGY | Facility: AMBULATORY SURGERY CENTER | Age: 65
End: 2025-02-11

## 2025-02-12 ENCOUNTER — APPOINTMENT (OUTPATIENT)
Dept: OPHTHALMOLOGY | Facility: CLINIC | Age: 65
End: 2025-02-12

## 2025-02-18 ENCOUNTER — APPOINTMENT (OUTPATIENT)
Dept: OPHTHALMOLOGY | Facility: CLINIC | Age: 65
End: 2025-02-18

## 2025-02-27 ENCOUNTER — APPOINTMENT (OUTPATIENT)
Dept: OPHTHALMOLOGY | Facility: AMBULATORY SURGERY CENTER | Age: 65
End: 2025-02-27

## 2025-03-04 ENCOUNTER — APPOINTMENT (OUTPATIENT)
Dept: ORTHOPEDIC SURGERY | Facility: CLINIC | Age: 65
End: 2025-03-04

## 2025-03-04 PROCEDURE — 99214 OFFICE O/P EST MOD 30 MIN: CPT

## 2025-03-05 ENCOUNTER — EMERGENCY (EMERGENCY)
Facility: HOSPITAL | Age: 65
LOS: 1 days | Discharge: HOME | End: 2025-03-05
Admitting: EMERGENCY MEDICINE
Payer: MEDICARE

## 2025-03-05 VITALS
SYSTOLIC BLOOD PRESSURE: 157 MMHG | OXYGEN SATURATION: 98 % | RESPIRATION RATE: 18 BRPM | HEART RATE: 90 BPM | DIASTOLIC BLOOD PRESSURE: 93 MMHG

## 2025-03-05 VITALS
HEART RATE: 68 BPM | HEIGHT: 59 IN | RESPIRATION RATE: 20 BRPM | WEIGHT: 164.91 LBS | TEMPERATURE: 97 F | DIASTOLIC BLOOD PRESSURE: 82 MMHG | OXYGEN SATURATION: 98 % | SYSTOLIC BLOOD PRESSURE: 172 MMHG

## 2025-03-05 DIAGNOSIS — Z98.890 OTHER SPECIFIED POSTPROCEDURAL STATES: Chronic | ICD-10-CM

## 2025-03-05 DIAGNOSIS — Z90.49 ACQUIRED ABSENCE OF OTHER SPECIFIED PARTS OF DIGESTIVE TRACT: Chronic | ICD-10-CM

## 2025-03-05 DIAGNOSIS — Z90.89 ACQUIRED ABSENCE OF OTHER ORGANS: Chronic | ICD-10-CM

## 2025-03-05 DIAGNOSIS — K80.20 CALCULUS OF GALLBLADDER WITHOUT CHOLECYSTITIS WITHOUT OBSTRUCTION: Chronic | ICD-10-CM

## 2025-03-05 DIAGNOSIS — Z98.891 HISTORY OF UTERINE SCAR FROM PREVIOUS SURGERY: Chronic | ICD-10-CM

## 2025-03-05 DIAGNOSIS — Z98.51 TUBAL LIGATION STATUS: Chronic | ICD-10-CM

## 2025-03-05 PROCEDURE — 99284 EMERGENCY DEPT VISIT MOD MDM: CPT

## 2025-03-05 PROCEDURE — 99283 EMERGENCY DEPT VISIT LOW MDM: CPT

## 2025-03-05 RX ORDER — OXYCODONE HYDROCHLORIDE 30 MG/1
30 TABLET ORAL ONCE
Refills: 0 | Status: DISCONTINUED | OUTPATIENT
Start: 2025-03-05 | End: 2025-03-05

## 2025-03-05 RX ORDER — OXYCODONE HYDROCHLORIDE 30 MG/1
1 TABLET ORAL
Qty: 4 | Refills: 0
Start: 2025-03-05 | End: 2025-03-06

## 2025-03-05 RX ORDER — OXYCODONE HYDROCHLORIDE 30 MG/1
1 TABLET ORAL
Qty: 6 | Refills: 0
Start: 2025-03-05 | End: 2025-03-06

## 2025-03-05 RX ADMIN — OXYCODONE HYDROCHLORIDE 30 MILLIGRAM(S): 30 TABLET ORAL at 12:05

## 2025-03-05 RX ADMIN — OXYCODONE HYDROCHLORIDE 30 MILLIGRAM(S): 30 TABLET ORAL at 11:58

## 2025-03-05 NOTE — ED PROVIDER NOTE - NSFOLLOWUPINSTRUCTIONS_ED_ALL_ED_FT
Thank you for visiting [facility] Emergency Department.      We saw you today for back pain.    Please know that no emergency visit is complete without follow-up with your primary care provider in 1 week.  Please bring copies of all discharge papers and results and show to your doctor.      Please continue taking all previous medications as instructed unless we discussed otherwise.     I appreciated your patience and hope you feel better soon.     Return to ER immediately if you develop fevers, chills, chest pain, shortness of breath, worsening and/or any concerning symptoms.

## 2025-03-05 NOTE — ED PROVIDER NOTE - PATIENT PORTAL LINK FT
You can access the FollowMyHealth Patient Portal offered by [facility] by registering at the following website: [email or web address]. By joining [facility]’s FollowMyHealth portal, you will also be able to view your health information using other applications (apps) compatible with our system.

## 2025-03-05 NOTE — ED ADULT TRIAGE NOTE - SOURCE OF INFORMATION
Patient CONSTITUTIONAL: No fevers, no chills  Eyes: no visual changes  Ears: no ear drainage, no ear pain  Nose: no nasal congestion  Mouth/Throat: no sore throat  Cardiovascular: No Chest pain  Respiratory: No SOB  Gastrointestinal: No n/v/d, +abd pain  Genitourinary: no dysuria, no hematuria  SKIN: no rashes.  NEURO: no headache

## 2025-03-05 NOTE — ED PROVIDER NOTE - PHYSICAL EXAMINATION
CONSTITUTIONAL: Awake, alert.  Nontoxic, appears uncomfortable    HEAD: Normocephalic, atraumatic.    EYES: Conjunctivae clear without exudates or hemorrhage. Sclera is non-icteric.    ENT: Normal appearing external ears, nose, mucous membranes moist.    NECK: supple, trachea midline.    HEART:  Normal rate, regular rhythm.      LUNGS:  No acute respiratory distress.  Non-tachypneic and non-labored.     ABDOMEN: Normal appearing skin without lesions, rashes.  Normal bowel sounds x 4.  Soft, non-distended, non-tender in all four quadrants. No rebound or guarding. No hernias or masses palpable.  No pulsatile abdominal mass.   No CVA tenderness b/l.    BACK: No focal midine ttp.  Generalized ttp to lower back.    MUSCULOSKELETAL:  Normal appearing extremities without obvious deformity, rash, ecchymosis, erythema.  No swelling.  Warm. No focal tenderness.  FROM b/l upper and lower extremities.  5/5 strength b/l upper and lower ext.  Sensation and motor function grossly intact.  Strong equal peripheral pulses b/l.   Cap refill < 2 b/l upper and lower ext.  All compartments soft.    SKIN: Skin in warm, dry and intact without rashes or lesions.  Appropriate color for ethnicity.    NEUROLOGICAL:  Patient is alert, oriented x person, place and time.    PSYCH: Appropriate mood and affect. Good judgment and insight.

## 2025-03-05 NOTE — ED ADULT TRIAGE NOTE - CHIEF COMPLAINT QUOTE
Pt presents to the ED with complaints of back pain, states she has been out of medication for the past three day, takes oxycontin 30mg and oxycodone hydrochlororide 30mg, history of spinal fusions, denies recent injury or trauma.

## 2025-03-05 NOTE — ED PROVIDER NOTE - CARE PLAN
1 [employer] Counseling:  I discussed with the patient the risks of mycophenolate mofetil including but not limited to infection/immunosuppression, GI upset, hypokalemia, hypercholesterolemia, bone marrow suppression, lymphoproliferative disorders, malignancy, GI ulceration/bleed/perforation, colitis, interstitial lung disease, kidney failure, progressive multifocal leukoencephalopathy, and birth defects.  The patient understands that monitoring is required including a baseline creatinine and regular CBC testing. In addition, patient must alert us immediately if symptoms of infection or other concerning signs are noted. Principal Discharge DX:	Back pain

## 2025-03-05 NOTE — ED PROVIDER NOTE - CLINICAL SUMMARY MEDICAL DECISION MAKING FREE TEXT BOX
Patient is a 64 y/o female with PMHx of lupus, RA, and significant spinal stenosis (s/p 3 lumbar spine fusions and 1 cervical fusion) p/w acute on chronic lower back pain.  Pt has chronic lower back pain radiating to LLE with associated L leg numbness/tingling.  States takes Oxycontin 30 mg q12h and Oxycodone 30 mg q8h (prescribed by her pain mgmt doctor) and states ran out of medications on 3/2/25.  Since running out of meds has been having severe exacerbation of pain.  Has pain mgmt doctor, Dr. Dunlap, who pt states is on vacation.   States refill of meds was reportedly sent to pharmacy, but pharmacy said they would not fill until 3/20/25.  Saw her surgeon, Dr. Elias, yesterday, who rec'd pt go to ER if unable to get pain meds.  Pt reports feeling chills and feels like withdrawing from her opioid pain meds.  Denies fever, cp, sob, abd pain, changes to urination/bowel habits, saddle anesthesia, new numbness/tingling/weakness to ext.  VS notable for /82.  No red flags suggestive of acute cord compression/cauda equina/ spinal abscess.  Overall suspect likely exacerbation of pain and likely element of opioid WD in setting of running out of high dose pain meds x 3 days  Will give dose pain meds.  Istop reveals last refill on 2/2/25.  Attempt to provide 1-2 days worth of refill, until pt able to sort out with pharmacy/pain mgmt doctor as

## 2025-03-07 DIAGNOSIS — G89.29 OTHER CHRONIC PAIN: ICD-10-CM

## 2025-03-07 DIAGNOSIS — M54.50 LOW BACK PAIN, UNSPECIFIED: ICD-10-CM

## 2025-03-07 DIAGNOSIS — T50.906A UNDERDOSING OF UNSPECIFIED DRUGS, MEDICAMENTS AND BIOLOGICAL SUBSTANCES, INITIAL ENCOUNTER: ICD-10-CM

## 2025-03-07 DIAGNOSIS — M48.061 SPINAL STENOSIS, LUMBAR REGION WITHOUT NEUROGENIC CLAUDICATION: ICD-10-CM

## 2025-03-07 DIAGNOSIS — M32.9 SYSTEMIC LUPUS ERYTHEMATOSUS, UNSPECIFIED: ICD-10-CM

## 2025-03-07 DIAGNOSIS — Z88.8 ALLERGY STATUS TO OTHER DRUGS, MEDICAMENTS AND BIOLOGICAL SUBSTANCES: ICD-10-CM

## 2025-03-07 DIAGNOSIS — R20.0 ANESTHESIA OF SKIN: ICD-10-CM

## 2025-03-10 ENCOUNTER — RX RENEWAL (OUTPATIENT)
Age: 65
End: 2025-03-10

## 2025-03-13 ENCOUNTER — RX RENEWAL (OUTPATIENT)
Age: 65
End: 2025-03-13

## 2025-03-18 ENCOUNTER — APPOINTMENT (OUTPATIENT)
Dept: OPHTHALMOLOGY | Facility: CLINIC | Age: 65
End: 2025-03-18
Payer: MEDICARE

## 2025-03-18 ENCOUNTER — NON-APPOINTMENT (OUTPATIENT)
Age: 65
End: 2025-03-18

## 2025-03-18 PROCEDURE — 92012 INTRM OPH EXAM EST PATIENT: CPT

## 2025-03-25 NOTE — H&P ADULT - PROBLEM SELECTOR PROBLEM 4
A closure device system was utilized/deployed for pre-closure of the right femoral artery access site using a DEVICE [facility] PROSTYLE [facility] DISP - [id number]. Systemic lupus

## 2025-04-21 NOTE — ASU PATIENT PROFILE, ADULT - NSICDXPASTMEDICALHX_GEN_ALL_CORE_FT
PAST MEDICAL HISTORY:  Arthritis     Asthma     Back pain     Congenital heart disease     History of depression     HLD (hyperlipidemia)     HTN (hypertension)     Obstructive sleep apnea     Rheumatoid arthritis     Systemic lupus

## 2025-04-21 NOTE — ASU PATIENT PROFILE, ADULT - FALL HARM RISK - HARM RISK INTERVENTIONS

## 2025-04-21 NOTE — ASU PATIENT PROFILE, ADULT - NS PREOP UNDERSTANDS INFO
No solids/dairy/ chewing gum/candy after MN. Water/clear apple juice only 3 hours before the procedure, after that NPO, No smoking or drinking alcohol the night before. Bring photo ID, insurance card, escort needs to be 18 years or older and have photo ID.  No jewelry , no makeup, no creams or lotion, no powders, no contact lenses . Wear comfortable, easy to manage clothing. Address and phone number given.

## 2025-04-21 NOTE — ASU PATIENT PROFILE, ADULT - TRANSFUSION PREMEDICATION REQUIRED
[Alert] : alert [Well Nourished] : well nourished [No Acute Distress] : no acute distress [Well Developed] : well developed [Normal Sclera/Conjunctiva] : normal sclera/conjunctiva [EOMI] : extra ocular movement intact [No Proptosis] : no proptosis [Normal Outer Ear/Nose] : the ears and nose were normal in appearance [Normal Oropharynx] : the oropharynx was normal [No Neck Mass] : no neck mass was observed [No LAD] : no lymphadenopathy [Thyroid Not Enlarged] : the thyroid was not enlarged [No Thyroid Nodules] : no palpable thyroid nodules [No Respiratory Distress] : no respiratory distress [No Accessory Muscle Use] : no accessory muscle use [Clear to Auscultation] : lungs were clear to auscultation bilaterally [Normal S1, S2] : normal S1 and S2 [Normal Rate] : heart rate was normal [Regular Rhythm] : with a regular rhythm [No Edema] : no peripheral edema [Pedal Pulses Normal] : the pedal pulses are present [No Varicosities] : there were no varicosital changes [Normal Bowel Sounds] : normal bowel sounds [Not Tender] : non-tender [Not Distended] : not distended [Soft] : abdomen soft [Normal Anterior Cervical Nodes] : no anterior cervical lymphadenopathy [Normal Posterior Cervical Nodes] : no posterior cervical lymphadenopathy [No Spinal Tenderness] : no spinal tenderness [Spine Straight] : spine straight [No Stigmata of Cushings Syndrome] : no stigmata of Cushings Syndrome [Normal Gait] : normal gait [No Involuntary Movements] : no involuntary movements were seen [No Rash] : no rash [Right foot was examined, including] : right foot ~C was examined, including visual inspection with sensory and pulse exams [Left foot was examined, including] : left foot ~C was examined, including visual inspection with sensory and pulse exams [Normal] : normal [#4 Diminished] : number 4 was diminished [#5 Diminished] : number 5 was diminished [#9 Diminished] : number 9 was diminished [Normal Reflexes] : deep tendon reflexes were 2+ and symmetric [No Tremors] : no tremors [Oriented x3] : oriented to person, place, and time [Acanthosis Nigricans] : no acanthosis nigricans [Foot Ulcers] : no foot ulcers none

## 2025-04-22 ENCOUNTER — APPOINTMENT (OUTPATIENT)
Dept: OPHTHALMOLOGY | Facility: AMBULATORY SURGERY CENTER | Age: 65
End: 2025-04-22
Payer: MEDICARE

## 2025-04-22 ENCOUNTER — OUTPATIENT (OUTPATIENT)
Dept: OUTPATIENT SERVICES | Facility: HOSPITAL | Age: 65
LOS: 1 days | Discharge: HOME | End: 2025-04-22

## 2025-04-22 ENCOUNTER — NON-APPOINTMENT (OUTPATIENT)
Age: 65
End: 2025-04-22

## 2025-04-22 VITALS
RESPIRATION RATE: 20 BRPM | OXYGEN SATURATION: 97 % | HEART RATE: 72 BPM | SYSTOLIC BLOOD PRESSURE: 136 MMHG | TEMPERATURE: 98 F | DIASTOLIC BLOOD PRESSURE: 66 MMHG

## 2025-04-22 VITALS
RESPIRATION RATE: 16 BRPM | OXYGEN SATURATION: 96 % | DIASTOLIC BLOOD PRESSURE: 80 MMHG | WEIGHT: 158.51 LBS | TEMPERATURE: 98 F | HEART RATE: 72 BPM | HEIGHT: 59 IN | SYSTOLIC BLOOD PRESSURE: 125 MMHG

## 2025-04-22 DIAGNOSIS — Z98.890 OTHER SPECIFIED POSTPROCEDURAL STATES: Chronic | ICD-10-CM

## 2025-04-22 DIAGNOSIS — Z98.51 TUBAL LIGATION STATUS: Chronic | ICD-10-CM

## 2025-04-22 DIAGNOSIS — Z90.89 ACQUIRED ABSENCE OF OTHER ORGANS: Chronic | ICD-10-CM

## 2025-04-22 DIAGNOSIS — Z98.891 HISTORY OF UTERINE SCAR FROM PREVIOUS SURGERY: Chronic | ICD-10-CM

## 2025-04-22 DIAGNOSIS — Z90.49 ACQUIRED ABSENCE OF OTHER SPECIFIED PARTS OF DIGESTIVE TRACT: Chronic | ICD-10-CM

## 2025-04-22 DIAGNOSIS — K80.20 CALCULUS OF GALLBLADDER WITHOUT CHOLECYSTITIS WITHOUT OBSTRUCTION: Chronic | ICD-10-CM

## 2025-04-22 PROCEDURE — 66984 XCAPSL CTRC RMVL W/O ECP: CPT | Mod: RT

## 2025-04-22 DEVICE — LENS IOL TECNIS SMPLCTY 1PC CLR MONO DCB0000 21.0D
Type: IMPLANTABLE DEVICE | Site: RIGHT | Status: NON-FUNCTIONAL
Removed: 2025-04-22

## 2025-04-22 RX ORDER — ONDANSETRON HCL/PF 4 MG/2 ML
4 VIAL (ML) INJECTION ONCE
Refills: 0 | Status: DISCONTINUED | OUTPATIENT
Start: 2025-04-22 | End: 2025-04-22

## 2025-04-22 RX ORDER — DIAZEPAM 2 MG/1
1 TABLET ORAL
Refills: 0 | DISCHARGE

## 2025-04-22 RX ORDER — GABAPENTIN 400 MG/1
1 CAPSULE ORAL
Refills: 0 | DISCHARGE

## 2025-04-22 RX ORDER — SULFASALAZINE 500 MG/1
2 TABLET ORAL
Refills: 0 | DISCHARGE

## 2025-04-22 RX ORDER — FENTANYL CITRATE-0.9 % NACL/PF 100MCG/2ML
25 SYRINGE (ML) INTRAVENOUS
Refills: 0 | Status: DISCONTINUED | OUTPATIENT
Start: 2025-04-22 | End: 2025-04-22

## 2025-04-22 RX ORDER — SODIUM CHLORIDE 9 G/1000ML
500 INJECTION, SOLUTION INTRAVENOUS
Refills: 0 | Status: DISCONTINUED | OUTPATIENT
Start: 2025-04-22 | End: 2025-04-22

## 2025-04-22 RX ORDER — PHENYLEPHRINE HYDROCHLORIDE 25 MG/ML
1 SOLUTION OPHTHALMIC
Refills: 0 | Status: COMPLETED | OUTPATIENT
Start: 2025-04-22 | End: 2025-04-22

## 2025-04-22 RX ORDER — METHOTREXATE 25 MG/ML
15 INJECTION, SOLUTION INTRA-ARTERIAL; INTRAMUSCULAR; INTRATHECAL; INTRAVENOUS
Refills: 0 | DISCHARGE

## 2025-04-22 RX ORDER — TROPICAMIDE
1 POWDER (GRAM) MISCELLANEOUS
Refills: 0 | Status: COMPLETED | OUTPATIENT
Start: 2025-04-22 | End: 2025-04-22

## 2025-04-22 RX ORDER — CYCLOBENZAPRINE HYDROCHLORIDE 15 MG/1
1 CAPSULE, EXTENDED RELEASE ORAL
Refills: 0 | DISCHARGE

## 2025-04-22 RX ORDER — PREDNISONE 20 MG/1
1 TABLET ORAL
Refills: 0 | DISCHARGE

## 2025-04-22 RX ORDER — DIPHENHYDRAMINE HCL 12.5MG/5ML
25 ELIXIR ORAL EVERY 4 HOURS
Refills: 0 | Status: DISCONTINUED | OUTPATIENT
Start: 2025-04-22 | End: 2025-04-22

## 2025-04-22 RX ORDER — HYDROCHLOROTHIAZIDE 50 MG/1
1 TABLET ORAL
Refills: 0 | DISCHARGE

## 2025-04-22 RX ORDER — ATORVASTATIN CALCIUM 80 MG/1
1 TABLET, FILM COATED ORAL
Refills: 0 | DISCHARGE

## 2025-04-22 RX ORDER — TETRACAINE HYDROCHLORIDE 5 MG/ML
1 SOLUTION OPHTHALMIC ONCE
Refills: 0 | Status: COMPLETED | OUTPATIENT
Start: 2025-04-22 | End: 2025-04-22

## 2025-04-22 RX ORDER — SODIUM CHLORIDE 9 G/1000ML
1000 INJECTION, SOLUTION INTRAVENOUS
Refills: 0 | Status: DISCONTINUED | OUTPATIENT
Start: 2025-04-22 | End: 2025-04-22

## 2025-04-22 RX ADMIN — Medication 1 DROP(S): at 17:00

## 2025-04-22 RX ADMIN — Medication 1 DROP(S): at 17:05

## 2025-04-22 RX ADMIN — Medication 1 DROP(S): at 17:10

## 2025-04-22 RX ADMIN — Medication 25 MILLIGRAM(S): at 19:41

## 2025-04-22 RX ADMIN — PHENYLEPHRINE HYDROCHLORIDE 1 DROP(S): 25 SOLUTION OPHTHALMIC at 17:00

## 2025-04-22 RX ADMIN — TETRACAINE HYDROCHLORIDE 1 DROP(S): 5 SOLUTION OPHTHALMIC at 17:00

## 2025-04-22 RX ADMIN — PHENYLEPHRINE HYDROCHLORIDE 1 DROP(S): 25 SOLUTION OPHTHALMIC at 17:05

## 2025-04-22 RX ADMIN — PHENYLEPHRINE HYDROCHLORIDE 1 DROP(S): 25 SOLUTION OPHTHALMIC at 17:10

## 2025-04-22 NOTE — PRE-ANESTHESIA EVALUATION ADULT - NSANTHOSAYNRD_GEN_A_CORE
No. FAUSTO screening performed.  STOP BANG Legend: 0-2 = LOW Risk; 3-4 = INTERMEDIATE Risk; 5-8 = HIGH Risk

## 2025-04-22 NOTE — PRE-ANESTHESIA EVALUATION ADULT - NSANTHPMHFT_GEN_ALL_CORE
66 yo F w/ SLE, RA, OA, HTN, asthma, MDD, prior lumbar and cervical fusion, chronic pain, hx of cholecystectomy, prior shoulder arthroscopy, prior hernia repair and appendectomy who presents for the above listed procedure. 66 yo F w/ SLE, RA, OA, HTN, asthma, bipolar disorder, MDD, prior lumbar and cervical fusion, chronic pain, hx of cholecystectomy, prior shoulder arthroscopy, prior hernia repair and appendectomy who presents for the above listed procedure.

## 2025-04-22 NOTE — PRE-ANESTHESIA EVALUATION ADULT - NSANTHADDINFOFT_GEN_ALL_CORE
Risks, benefits and alternatives discussed; including but not limited to headache, nausea, bleeding, infection and local trauma/positioning related injury. All questions answered. The patient agrees to proceed.    Plan: MAC

## 2025-04-22 NOTE — OPERATIVE REPORT - OPERATIVE RPOSRT DETAILS
SURGEON: Matilde Mckeon    ASSISTANT: none    PRE-OP DIAGNOSIS: cataract OD    POST-OP DIAGNOSIS: Same    ANESTHESIA: MAC, local    PROCEDURE: cataract extraction with intraocular lens placement, right eye    SPECIMEN/TISSUE REMOVED: None    ESTIMATED BLOOD LOSS: < 1mL    COMPLICATIONS: None    PROCEDURE:    The patient was seen in the preoperative area. The risks, benefits, and alternatives to surgery were discussed. All questions were answered.  The patient was given standard preoperative drops. The patient was then brought to the operating room and prepped and draped in the usual sterile fashion for ophthalmic surgery.    A lid speculum was used to expose the eye.  A paracentesis was created with an MVR blade at 10 o'clock hour from the temporal limbus in the clockwise direction.  Next, 1% Preservative-free Lidocaine was instilled into the anterior chamber followed by viscoat.  A clear corneal incision was created with the aid of a crescent blade and a 2.4 mm sharped tip keratome at the 8 o'clock position.  A cystotome and Utrata forceps was used to create a continuous curvilinear capsulorrhexis.  Balanced salt solution was used for hydro dissection.  The nucleus was then phacoemulsified and aspirated using a divide and conquer technique.  The remaining epinucleus and cortical material was aspirated from the eye.  The capsular bag was then reformed using provisc in anticipation of the introduction of an intraocular lens implant which is a DCBOO +21.0D SN [id number].  The implant was injected into the capsular bag.  After centration, the remaining viscoelastic material was removed using the IA handpiece.    The temporal clear corneal and paracentesis incisions were hydrated with balanced salt solution to achieve adequate watertight closure. The wounds were checked for leaks and found to be negative.    An antibiotic was instilled into the eye, and the eye was shielded.    The patient tolerated the procedure well and was transferred to the recovery room in stable condition. They received standard postoperative instructions and an appointment to follow up the next day.

## 2025-04-23 ENCOUNTER — NON-APPOINTMENT (OUTPATIENT)
Age: 65
End: 2025-04-23

## 2025-04-23 ENCOUNTER — APPOINTMENT (OUTPATIENT)
Dept: OPHTHALMOLOGY | Facility: CLINIC | Age: 65
End: 2025-04-23
Payer: MEDICARE

## 2025-04-23 PROCEDURE — 99024 POSTOP FOLLOW-UP VISIT: CPT

## 2025-04-24 ENCOUNTER — NON-APPOINTMENT (OUTPATIENT)
Age: 65
End: 2025-04-24

## 2025-04-24 ENCOUNTER — APPOINTMENT (OUTPATIENT)
Dept: OPHTHALMOLOGY | Facility: CLINIC | Age: 65
End: 2025-04-24
Payer: MEDICARE

## 2025-04-24 PROCEDURE — 99024 POSTOP FOLLOW-UP VISIT: CPT

## 2025-04-28 ENCOUNTER — NON-APPOINTMENT (OUTPATIENT)
Age: 65
End: 2025-04-28

## 2025-04-28 ENCOUNTER — APPOINTMENT (OUTPATIENT)
Dept: OPHTHALMOLOGY | Facility: CLINIC | Age: 65
End: 2025-04-28
Payer: MEDICARE

## 2025-04-28 PROBLEM — Q24.9 CONGENITAL MALFORMATION OF HEART, UNSPECIFIED: Chronic | Status: ACTIVE | Noted: 2025-04-22

## 2025-04-28 PROBLEM — J45.909 UNSPECIFIED ASTHMA, UNCOMPLICATED: Chronic | Status: ACTIVE | Noted: 2025-04-22

## 2025-04-28 PROBLEM — Z86.59 PERSONAL HISTORY OF OTHER MENTAL AND BEHAVIORAL DISORDERS: Chronic | Status: ACTIVE | Noted: 2025-04-22

## 2025-04-28 PROBLEM — M06.9 RHEUMATOID ARTHRITIS, UNSPECIFIED: Chronic | Status: ACTIVE | Noted: 2025-04-22

## 2025-04-28 PROCEDURE — 92134 CPTRZ OPH DX IMG PST SGM RTA: CPT

## 2025-04-28 PROCEDURE — 92014 COMPRE OPH EXAM EST PT 1/>: CPT | Mod: 25

## 2025-05-08 ENCOUNTER — APPOINTMENT (OUTPATIENT)
Dept: OPHTHALMOLOGY | Facility: CLINIC | Age: 65
End: 2025-05-08

## 2025-05-08 ENCOUNTER — NON-APPOINTMENT (OUTPATIENT)
Age: 65
End: 2025-05-08

## 2025-05-09 ENCOUNTER — EMERGENCY (EMERGENCY)
Facility: HOSPITAL | Age: 65
LOS: 1 days | End: 2025-05-09
Attending: EMERGENCY MEDICINE | Admitting: EMERGENCY MEDICINE
Payer: MEDICARE

## 2025-05-09 VITALS
HEART RATE: 80 BPM | RESPIRATION RATE: 18 BRPM | WEIGHT: 156.09 LBS | HEIGHT: 59 IN | TEMPERATURE: 98 F | DIASTOLIC BLOOD PRESSURE: 97 MMHG | OXYGEN SATURATION: 97 % | SYSTOLIC BLOOD PRESSURE: 151 MMHG

## 2025-05-09 VITALS
OXYGEN SATURATION: 98 % | RESPIRATION RATE: 17 BRPM | SYSTOLIC BLOOD PRESSURE: 161 MMHG | DIASTOLIC BLOOD PRESSURE: 87 MMHG | HEART RATE: 75 BPM | TEMPERATURE: 98 F

## 2025-05-09 DIAGNOSIS — Z98.890 OTHER SPECIFIED POSTPROCEDURAL STATES: Chronic | ICD-10-CM

## 2025-05-09 DIAGNOSIS — Z90.49 ACQUIRED ABSENCE OF OTHER SPECIFIED PARTS OF DIGESTIVE TRACT: Chronic | ICD-10-CM

## 2025-05-09 DIAGNOSIS — K80.20 CALCULUS OF GALLBLADDER WITHOUT CHOLECYSTITIS WITHOUT OBSTRUCTION: Chronic | ICD-10-CM

## 2025-05-09 DIAGNOSIS — Z98.51 TUBAL LIGATION STATUS: Chronic | ICD-10-CM

## 2025-05-09 DIAGNOSIS — Z98.891 HISTORY OF UTERINE SCAR FROM PREVIOUS SURGERY: Chronic | ICD-10-CM

## 2025-05-09 DIAGNOSIS — Z90.89 ACQUIRED ABSENCE OF OTHER ORGANS: Chronic | ICD-10-CM

## 2025-05-09 LAB
ANION GAP SERPL CALC-SCNC: 5 MMOL/L — SIGNIFICANT CHANGE UP (ref 5–17)
APPEARANCE UR: CLEAR — SIGNIFICANT CHANGE UP
BASOPHILS # BLD AUTO: 0.03 K/UL — SIGNIFICANT CHANGE UP (ref 0–0.2)
BASOPHILS NFR BLD AUTO: 0.4 % — SIGNIFICANT CHANGE UP (ref 0–2)
BILIRUB UR-MCNC: ABNORMAL
BUN SERPL-MCNC: 14 MG/DL — SIGNIFICANT CHANGE UP (ref 7–23)
CALCIUM SERPL-MCNC: 9.1 MG/DL — SIGNIFICANT CHANGE UP (ref 8.4–10.5)
CHLORIDE SERPL-SCNC: 107 MMOL/L — SIGNIFICANT CHANGE UP (ref 96–108)
CO2 SERPL-SCNC: 31 MMOL/L — SIGNIFICANT CHANGE UP (ref 22–31)
COLOR SPEC: SIGNIFICANT CHANGE UP
CREAT SERPL-MCNC: 0.77 MG/DL — SIGNIFICANT CHANGE UP (ref 0.5–1.3)
DIFF PNL FLD: NEGATIVE — SIGNIFICANT CHANGE UP
EGFR: 85 ML/MIN/1.73M2 — SIGNIFICANT CHANGE UP
EGFR: 85 ML/MIN/1.73M2 — SIGNIFICANT CHANGE UP
EOSINOPHIL # BLD AUTO: 0.11 K/UL — SIGNIFICANT CHANGE UP (ref 0–0.5)
EOSINOPHIL NFR BLD AUTO: 1.5 % — SIGNIFICANT CHANGE UP (ref 0–6)
GLUCOSE SERPL-MCNC: 94 MG/DL — SIGNIFICANT CHANGE UP (ref 70–99)
GLUCOSE UR QL: NEGATIVE MG/DL — SIGNIFICANT CHANGE UP
HCT VFR BLD CALC: 37.7 % — SIGNIFICANT CHANGE UP (ref 34.5–45)
HGB BLD-MCNC: 12.5 G/DL — SIGNIFICANT CHANGE UP (ref 11.5–15.5)
IMM GRANULOCYTES NFR BLD AUTO: 0.1 % — SIGNIFICANT CHANGE UP (ref 0–0.9)
KETONES UR-MCNC: ABNORMAL MG/DL
LEUKOCYTE ESTERASE UR-ACNC: ABNORMAL
LYMPHOCYTES # BLD AUTO: 3.1 K/UL — SIGNIFICANT CHANGE UP (ref 1–3.3)
LYMPHOCYTES # BLD AUTO: 43 % — SIGNIFICANT CHANGE UP (ref 13–44)
MCHC RBC-ENTMCNC: 32.1 PG — SIGNIFICANT CHANGE UP (ref 27–34)
MCHC RBC-ENTMCNC: 33.2 G/DL — SIGNIFICANT CHANGE UP (ref 32–36)
MCV RBC AUTO: 96.7 FL — SIGNIFICANT CHANGE UP (ref 80–100)
MONOCYTES # BLD AUTO: 0.72 K/UL — SIGNIFICANT CHANGE UP (ref 0–0.9)
MONOCYTES NFR BLD AUTO: 10 % — SIGNIFICANT CHANGE UP (ref 2–14)
NEUTROPHILS # BLD AUTO: 3.24 K/UL — SIGNIFICANT CHANGE UP (ref 1.8–7.4)
NEUTROPHILS NFR BLD AUTO: 45 % — SIGNIFICANT CHANGE UP (ref 43–77)
NITRITE UR-MCNC: NEGATIVE — SIGNIFICANT CHANGE UP
NRBC BLD AUTO-RTO: 0 /100 WBCS — SIGNIFICANT CHANGE UP (ref 0–0)
PH UR: 5.5 — SIGNIFICANT CHANGE UP (ref 5–8)
PLATELET # BLD AUTO: 153 K/UL — SIGNIFICANT CHANGE UP (ref 150–400)
POTASSIUM SERPL-MCNC: 4.1 MMOL/L — SIGNIFICANT CHANGE UP (ref 3.5–5.3)
POTASSIUM SERPL-SCNC: 4.1 MMOL/L — SIGNIFICANT CHANGE UP (ref 3.5–5.3)
PROT UR-MCNC: SIGNIFICANT CHANGE UP MG/DL
RBC # BLD: 3.9 M/UL — SIGNIFICANT CHANGE UP (ref 3.8–5.2)
RBC # FLD: 16.3 % — HIGH (ref 10.3–14.5)
SODIUM SERPL-SCNC: 143 MMOL/L — SIGNIFICANT CHANGE UP (ref 135–145)
SP GR SPEC: 1.02 — SIGNIFICANT CHANGE UP (ref 1–1.03)
UROBILINOGEN FLD QL: 1 MG/DL — SIGNIFICANT CHANGE UP (ref 0.2–1)
WBC # BLD: 7.21 K/UL — SIGNIFICANT CHANGE UP (ref 3.8–10.5)
WBC # FLD AUTO: 7.21 K/UL — SIGNIFICANT CHANGE UP (ref 3.8–10.5)

## 2025-05-09 PROCEDURE — 80048 BASIC METABOLIC PNL TOTAL CA: CPT

## 2025-05-09 PROCEDURE — 36415 COLL VENOUS BLD VENIPUNCTURE: CPT

## 2025-05-09 PROCEDURE — 99284 EMERGENCY DEPT VISIT MOD MDM: CPT

## 2025-05-09 PROCEDURE — 96374 THER/PROPH/DIAG INJ IV PUSH: CPT

## 2025-05-09 PROCEDURE — 81001 URINALYSIS AUTO W/SCOPE: CPT

## 2025-05-09 PROCEDURE — 85025 COMPLETE CBC W/AUTO DIFF WBC: CPT

## 2025-05-09 PROCEDURE — 99284 EMERGENCY DEPT VISIT MOD MDM: CPT | Mod: 25

## 2025-05-09 RX ADMIN — Medication 1000 MILLILITER(S): at 15:59

## 2025-05-09 RX ADMIN — Medication 8 MILLIGRAM(S): at 16:19

## 2025-05-09 NOTE — ED ADULT TRIAGE NOTE - CHIEF COMPLAINT QUOTE
Patient to the ED c/o intermittent urinary retention x several months, worsening today. Last void this AM. AADAPHNEY, NAD. Patient PMH SLE to the ED c/o intermittent urinary retention x several months, worsening today. Last void this AM. AADAPHNEY, NAD.

## 2025-05-09 NOTE — ED PROVIDER NOTE - OBJECTIVE STATEMENT
66 yo F PMH Lumbar spine surgery x 3, cervical spine surg x 1 presenting for concern about urine output  Over the past 2 months she has had 4 days of decreased urine output in the morning.  She does not feel like she hasn't fully emptied, just lower volume  No change in her usual neuro symptoms from her back but has had increased back pain x 2 days. 66 yo F PMH Lumbar spine surgery x 3, cervical spine surg x 1, RA, Lupus, OA, chronic back pain presenting for concern about urine output  Over the past 2 months she has had 4 days of decreased urine output in the morning.  She does not feel like she hasn't fully emptied, just lower volume than expected.  Urine is dark, but she thinks it is from her sulfasalazine which is a yellow/orange color   No numbness or weakness to the arms or legs, no saddle anesthesia  Increased back pain x 2 days  Started ozempic 2 months ago   No f/c/n/v/d, dysuria, hematuria 66 yo F PMH Lumbar spine surgery x 3, cervical spine surg x 1, RA, Lupus, OA, chronic back pain, renal stone presenting for concern about urine output  Over the past 2 months she has had 4 days of decreased urine output in the morning.  She does not feel like she hasn't fully emptied, just lower volume than expected.  Urine is dark, but she thinks it is from her sulfasalazine which is a yellow/orange color   No numbness or weakness to the arms or legs, no saddle anesthesia  Increased back pain x 2 days but does not feel like renal stone pain  Started ozempic 2 months ago   No f/c/n/v/d, dysuria, hematuria

## 2025-05-09 NOTE — ED PROVIDER NOTE - NSFOLLOWUPINSTRUCTIONS_ED_ALL_ED_FT
YOUR KIDNEY FUNCTION IS NORMAL     YOUR URINE SHOWS THAT YOU ARE DEHYDRATED AND THIS IS LIKELY WHY YOU HAVE LESS URINE THAN YOUR EXPECT.     YOU MAY WANT TO HOLD YOUR NEXT DOSE OF OZEMPIC TO SEE IF IT HELPS THE COTTON MOUTH AND THE URINE ISSUE.  YOU SHOULD ALSO INCREASE THE AMOUNT OF FLUIDS YOU DRINK-DON'T JUST DRINK WATER AND DON'T OVER DRINK.  YOUR URINE SHOULD BE A LIGHTER YELLOW. IF IT IS ALWAYS ORANGE-TINGED FROM YOUR MEDICINE, IT SHOULD STILL BE A LIGHTER SHADE.    SEE YOUR DOCTOR FOR A FOLLOW UP VISIT NEXT WEEK    RETURN TO THE EMERGENCY ROOM IMMEDIATELY FOR:  FEVER  YOU CANNOT URINATE OR YOU FEEL LIKE YOUR BLADDER ISN'T EMPTY (TODAY IT WAS)  YOU HAVE PAIN LIKE WHEN YOU HAD A KIDNEY STONE  YOU ARE VOMITING  YOU HAVE NUMBNESS OR WEAKNESS IN YOUR ARMS OR LEGS  YOU HAVE NUMBNESS NEAR THE ANUS OR VAGINA  ANY NEW, WORSENING OR CONCERNING SYMPTOMS

## 2025-05-09 NOTE — ED ADULT NURSE NOTE - OBJECTIVE STATEMENT
65 y.o. Female c/o urinary retention x hours. last void this AM. c/o abd pain. aaox3. ambulatory. denies fevers, chills, cp, sob, flank pain, nvd.

## 2025-05-09 NOTE — ED PROVIDER NOTE - PATIENT PORTAL LINK FT
You can access the FollowMyHealth Patient Portal offered by [facility] by registering at the following website: [email or web address]. By joining [employer]’s FollowMyHealth portal, you will also be able to view your health information using other applications (apps) compatible with our system.

## 2025-05-09 NOTE — ED PROVIDER NOTE - CLINICAL SUMMARY MEDICAL DECISION MAKING FREE TEXT BOX
64 yo F PMH Lumbar spine surgery x 3, cervical spine surg x 1, RA, Lupus, OA, chronic back pain presenting for concern about urine output  Over the past 2 months she has had 4 days of decreased urine output in the morning.  She does not feel like she hasn't fully emptied, just lower volume than expected.  Urine is dark, but she thinks it is from her sulfasalazine which is a yellow/orange color   No numbness or weakness to the arms or legs, no saddle anesthesia  Increased back pain x 2 days  Also with "cotton mouth for 2 months"  Started ozempic 2 months ago     Ddx:  Urine sample is dark yellow and appears concentrated.  PVR is zero and so there is not true retention.  Patient is most likely dehydrated from being on ozempic, possibly from other meds.  No signs of cauda equina-no numbness, weakness, saddle anesthesia.  I would not expect retention to be intermittent over a 2 month period if there were cauda equina and she does not have evidence of retention today.  Accordingly, will not proceed with imaging.  will check renal function, give fluids, assess for UTI. Patient requests meds for pain. 66 yo F PMH Lumbar spine surgery x 3, cervical spine surg x 1, RA, Lupus, OA, chronic back pain presenting for concern about urine output  Over the past 2 months she has had 4 days of decreased urine output in the morning.  She does not feel like she hasn't fully emptied, just lower volume than expected.  Urine is dark, but she thinks it is from her sulfasalazine which is a yellow/orange color   No numbness or weakness to the arms or legs, no saddle anesthesia  Increased back pain x 2 days  Also with "cotton mouth for 2 months"  Started ozempic 2 months ago     Ddx:  Urine sample is dark yellow and appears concentrated.  PVR is zero and so there is not true retention.  Patient is most likely dehydrated from being on ozempic, possibly from other meds.  No signs of cauda equina-no numbness, weakness, saddle anesthesia.  I would not expect retention to be intermittent over a 2 month period if there were cauda equina and she does not have evidence of retention today.  Accordingly, will not proceed with imaging.  will check renal function, give fluids, assess for UTI. Patient requests meds for pain.    17:10:  Discussed results, dx, treatment, follow up plan as well as strict return precautions in great detail with the patient and/or family member(s).  Questions answered and patient and or family member(s) verbalized understanding of the discussion and plan

## 2025-05-09 NOTE — ED PROVIDER NOTE - PHYSICAL EXAMINATION
General:  Well appearing, no distress  HEENT:  No conjunctival injection, neck supple, no congestion   Chest:  Non-tender, no crepitance  Lungs:  Clear to auscultation bilaterally   Heart:  s1s2 normal, no murmur  Abdomen:  soft, non-tender, non-distended  :  Normal sensation  Rectal:  Normal tone, normal sensation.  Chaperoned by primary RN   Back: Mild lumbar tenderness.  Patient able to st leg raise fully, able to get on and off the stretcher unassisted   Extremities: No edema, normal perfusion, no joint swelling or tenderness  Neuro:  Alert, conversant, reflexes equal and normal caliber at knees.  Normal motor and sensation.

## 2025-05-09 NOTE — ED ADULT NURSE NOTE - NSFALLUNIVINTERV_ED_ALL_ED
Bed/Stretcher in lowest position, wheels locked, appropriate side rails in place/Call bell, personal items and telephone in reach/Instruct patient to call for assistance before getting out of bed/chair/stretcher/Non-slip footwear applied when patient is off stretcher/[place] to call system/Physically safe environment - no spills, clutter or unnecessary equipment/Purposeful proactive rounding/Room/bathroom lighting operational, light cord in reach

## 2025-05-09 NOTE — ED ADULT NURSE NOTE - CHIEF COMPLAINT QUOTE
Patient PMH SLE to the ED c/o intermittent urinary retention x several months, worsening today. Last void this AM. AADAPHNEY, NAD.

## 2025-05-12 DIAGNOSIS — Z87.442 PERSONAL HISTORY OF URINARY CALCULI: ICD-10-CM

## 2025-05-12 DIAGNOSIS — L93.2 OTHER LOCAL LUPUS ERYTHEMATOSUS: ICD-10-CM

## 2025-05-12 DIAGNOSIS — M54.50 LOW BACK PAIN, UNSPECIFIED: ICD-10-CM

## 2025-05-12 DIAGNOSIS — M19.90 UNSPECIFIED OSTEOARTHRITIS, UNSPECIFIED SITE: ICD-10-CM

## 2025-05-12 DIAGNOSIS — G89.29 OTHER CHRONIC PAIN: ICD-10-CM

## 2025-05-12 DIAGNOSIS — Z88.8 ALLERGY STATUS TO OTHER DRUGS, MEDICAMENTS AND BIOLOGICAL SUBSTANCES: ICD-10-CM

## 2025-05-12 DIAGNOSIS — Z98.890 OTHER SPECIFIED POSTPROCEDURAL STATES: ICD-10-CM

## 2025-05-12 DIAGNOSIS — E86.0 DEHYDRATION: ICD-10-CM

## 2025-05-14 ENCOUNTER — APPOINTMENT (OUTPATIENT)
Dept: OPHTHALMOLOGY | Facility: CLINIC | Age: 65
End: 2025-05-14
Payer: MEDICARE

## 2025-05-14 ENCOUNTER — NON-APPOINTMENT (OUTPATIENT)
Age: 65
End: 2025-05-14

## 2025-05-14 PROCEDURE — 99024 POSTOP FOLLOW-UP VISIT: CPT

## 2025-05-20 ENCOUNTER — APPOINTMENT (OUTPATIENT)
Dept: RHEUMATOLOGY | Facility: CLINIC | Age: 65
End: 2025-05-20

## 2025-05-21 NOTE — ASU PATIENT PROFILE, ADULT - ABILITY TO HEAR (WITH HEARING AID OR HEARING APPLIANCE IF NORMALLY USED):
Adequate: hears normal conversation without difficulty Platinum/Mildly to Moderately Impaired: difficulty hearing in some environments or speaker may need to increase volume or speak distinctly

## 2025-05-21 NOTE — ASU PATIENT PROFILE, ADULT - NS PREOP UNDERSTANDS INFO
No solids/dairy/ chewing gum/candy after MN. Water allowed up to 6am. Bring photo ID, insurance card, escort needs to be 18 years or older and have photo ID.  No jewelry , no makeup, no creams or lotion, no powders, no contact lenses . Wear comfortable, easy to manage clothing. Address and phone number given./yes

## 2025-05-21 NOTE — ASU PATIENT PROFILE, ADULT - NS PRO PT RIGHT SUPPORT PERSON
[FreeTextEntry1] : \par CPE\par -Labs\par labs drawn in office and will be sent to [facility] core lab\par \par -Offered HIV/ STD/ Hep C Screening refused \par -Advised annual ophthalmology, dental and dermatology visits\par -Annual depression screening - negative   \par -urine dip +  protein  neg  leuks neg   nitrites  + blood sent out [facility] \par \par Enlarged neck\par -US neck\par -Tsh with reflex free t4\par \par candidal rash on chest\par nystatin cream bid x 10 days \par return if no relief\par \par advised if patient/mother doesn’t hear from me 1 week after testing to call office for results , patient agreed w/plan and understood.\par \par 
same name as above

## 2025-05-21 NOTE — ASU PATIENT PROFILE, ADULT - FALL HARM RISK - HARM RISK INTERVENTIONS

## 2025-05-21 NOTE — ASU PATIENT PROFILE, ADULT - NSICDXPASTMEDICALHX_GEN_ALL_CORE_FT
PAST MEDICAL HISTORY:  Arthritis     Asthma     Back pain     Congenital heart disease     History of depression     HLD (hyperlipidemia)     HTN (hypertension)     Obstructive sleep apnea     Rheumatoid arthritis     Systemic lupus      PAST MEDICAL HISTORY:  Arthritis     Asthma     Back pain     Congenital heart disease     History of depression     HLD (hyperlipidemia)     [place] (hard of hearing)     HTN (hypertension)     Obstructive sleep apnea     Rheumatoid arthritis     Systemic lupus

## 2025-05-21 NOTE — ASU PATIENT PROFILE, ADULT - NSICDXPASTSURGICALHX_GEN_ALL_CORE_FT
PAST SURGICAL HISTORY:  H/O carpal tunnel repair B/L hand    H/O  section x2    H/O hernia repair     H/O right knee surgery     H/O tubal ligation     History of appendectomy     History of arthroscopy of right shoulder     History of bunionectomy and reconstruction    History of cholecystectomy     History of lumbosacral spine surgery x2    History of tonsillectomy     S/P cataract surgery      PAST SURGICAL HISTORY:  H/O carpal tunnel repair B/L hand    H/O  section x2    H/O hernia repair     H/O right knee surgery     H/O tubal ligation     History of appendectomy     History of arthroscopy of right shoulder     History of bunionectomy and reconstruction    History of cholecystectomy     History of lumbosacral spine surgery x2 spinal fusion    History of tonsillectomy     S/P cataract surgery      PAST SURGICAL HISTORY:  H/O carpal tunnel repair B/L hand    H/O  section x2    H/O hernia repair     H/O right knee surgery     H/O tubal ligation     History of appendectomy     History of arthroscopy of right shoulder     History of bunionectomy and reconstruction    History of cholecystectomy     History of lumbosacral spine surgery x2 spinal fusion    History of surgery Cervical spine fusion    History of tonsillectomy     S/P cataract surgery

## 2025-05-22 ENCOUNTER — OUTPATIENT (OUTPATIENT)
Dept: OUTPATIENT SERVICES | Facility: HOSPITAL | Age: 65
LOS: 1 days | Discharge: HOME | End: 2025-05-22
Payer: MEDICARE

## 2025-05-22 ENCOUNTER — APPOINTMENT (OUTPATIENT)
Dept: OPHTHALMOLOGY | Facility: AMBULATORY SURGERY CENTER | Age: 65
End: 2025-05-22

## 2025-05-22 ENCOUNTER — TRANSCRIPTION ENCOUNTER (OUTPATIENT)
Age: 65
End: 2025-05-22

## 2025-05-22 ENCOUNTER — NON-APPOINTMENT (OUTPATIENT)
Age: 65
End: 2025-05-22

## 2025-05-22 VITALS — DIASTOLIC BLOOD PRESSURE: 64 MMHG | SYSTOLIC BLOOD PRESSURE: 123 MMHG | RESPIRATION RATE: 16 BRPM | TEMPERATURE: 97 F

## 2025-05-22 VITALS
DIASTOLIC BLOOD PRESSURE: 86 MMHG | RESPIRATION RATE: 18 BRPM | HEIGHT: 59 IN | SYSTOLIC BLOOD PRESSURE: 136 MMHG | TEMPERATURE: 98 F | HEART RATE: 56 BPM | OXYGEN SATURATION: 99 % | WEIGHT: 158.07 LBS

## 2025-05-22 DIAGNOSIS — Z98.890 OTHER SPECIFIED POSTPROCEDURAL STATES: Chronic | ICD-10-CM

## 2025-05-22 DIAGNOSIS — Z90.49 ACQUIRED ABSENCE OF OTHER SPECIFIED PARTS OF DIGESTIVE TRACT: Chronic | ICD-10-CM

## 2025-05-22 DIAGNOSIS — Z98.51 TUBAL LIGATION STATUS: Chronic | ICD-10-CM

## 2025-05-22 DIAGNOSIS — Z98.891 HISTORY OF UTERINE SCAR FROM PREVIOUS SURGERY: Chronic | ICD-10-CM

## 2025-05-22 DIAGNOSIS — Z98.49 CATARACT EXTRACTION STATUS, UNSPECIFIED EYE: Chronic | ICD-10-CM

## 2025-05-22 DIAGNOSIS — Z90.89 ACQUIRED ABSENCE OF OTHER ORGANS: Chronic | ICD-10-CM

## 2025-05-22 DIAGNOSIS — K80.20 CALCULUS OF GALLBLADDER WITHOUT CHOLECYSTITIS WITHOUT OBSTRUCTION: Chronic | ICD-10-CM

## 2025-05-22 PROCEDURE — 67042 VIT FOR MACULAR HOLE: CPT | Mod: 79,RT

## 2025-05-22 DEVICE — LASER PROBE 25G CONSTELLATION
Type: IMPLANTABLE DEVICE | Site: RIGHT | Status: NON-FUNCTIONAL
Removed: 2025-05-22

## 2025-05-22 RX ORDER — TROPICAMIDE
1 POWDER (GRAM) MISCELLANEOUS
Refills: 0 | Status: COMPLETED | OUTPATIENT
Start: 2025-05-22 | End: 2025-05-22

## 2025-05-22 RX ORDER — ATROPINE SULFATE 1 %
1 OINTMENT (GRAM) OPHTHALMIC (EYE)
Refills: 0 | Status: COMPLETED | OUTPATIENT
Start: 2025-05-22 | End: 2025-05-22

## 2025-05-22 RX ORDER — OXYCODONE HYDROCHLORIDE 30 MG/1
1 TABLET ORAL
Refills: 0 | DISCHARGE

## 2025-05-22 RX ORDER — PHENYLEPHRINE HYDROCHLORIDE 25 MG/ML
1 SOLUTION OPHTHALMIC
Refills: 0 | Status: COMPLETED | OUTPATIENT
Start: 2025-05-22 | End: 2025-05-22

## 2025-05-22 RX ORDER — TETRACAINE HYDROCHLORIDE 5 MG/ML
1 SOLUTION OPHTHALMIC ONCE
Refills: 0 | Status: COMPLETED | OUTPATIENT
Start: 2025-05-22 | End: 2025-05-22

## 2025-05-22 RX ORDER — OFLOXACIN 3 MG/ML
1 SOLUTION OPHTHALMIC
Refills: 0 | Status: COMPLETED | OUTPATIENT
Start: 2025-05-22 | End: 2025-05-22

## 2025-05-22 RX ADMIN — Medication 1 DROP(S): at 08:05

## 2025-05-22 RX ADMIN — Medication 1 DROP(S): at 08:00

## 2025-05-22 RX ADMIN — OFLOXACIN 1 DROP(S): 3 SOLUTION OPHTHALMIC at 08:05

## 2025-05-22 RX ADMIN — PHENYLEPHRINE HYDROCHLORIDE 1 DROP(S): 25 SOLUTION OPHTHALMIC at 07:55

## 2025-05-22 RX ADMIN — TETRACAINE HYDROCHLORIDE 1 DROP(S): 5 SOLUTION OPHTHALMIC at 07:55

## 2025-05-22 RX ADMIN — PHENYLEPHRINE HYDROCHLORIDE 1 DROP(S): 25 SOLUTION OPHTHALMIC at 08:05

## 2025-05-22 RX ADMIN — Medication 1 DROP(S): at 07:55

## 2025-05-22 RX ADMIN — PHENYLEPHRINE HYDROCHLORIDE 1 DROP(S): 25 SOLUTION OPHTHALMIC at 08:00

## 2025-05-22 RX ADMIN — OFLOXACIN 1 DROP(S): 3 SOLUTION OPHTHALMIC at 07:55

## 2025-05-22 RX ADMIN — OFLOXACIN 1 DROP(S): 3 SOLUTION OPHTHALMIC at 08:00

## 2025-05-22 NOTE — ASU DISCHARGE PLAN (ADULT/PEDIATRIC) - FINANCIAL ASSISTANCE
[facility] provides services at a reduced cost to those who are determined to be eligible through [facility]’s financial assistance program. Information regarding [facility]’s financial assistance program can be found by going to [email or web address] or by calling [phone].

## 2025-05-22 NOTE — OPERATIVE REPORT - OPERATIVE RPOSRT DETAILS
REPAIR OF MACULAR HOLE     PATIENT NAME: TAYLOR HUGHES    ATTENDING: Jessika Parks MD    PREOPERATIVE DIAGNOSIS(ES):  Macular hole, right eye     POSTOPERATIVE DIAGNOSIS(ES):  as above     PROCEDURE: Pars plana vitrectomy, membrane peel with removal of internal limiting membrane, air-fluid exchange, air-gas exchange - all of the right eye     INDICATIONS:       The patient is a  65y year old Female with a history of a macular hole in the operative eye. After a detailed review of the risks, benefits and alternatives of the procedure, including but not limited to bleeding, infection, inflammation, retinal detachment, loss of vision, loss of eye, double vision , unclear visual potential, need for further surgery, and systemic risks of anesthesia and surgery, informed consent was obtained and the patient elected to proceed with the surgery.     PROCEDURE:       On the day of surgery, after clearance by medicine and anesthesia, the patient was brought to the operating room in stable condition. After verifying the correct surgical site, the patient was placed in the supine position. Intravenous sedation was provided by the anesthesia team.  After a brief time-out, a 1:1 mixture of 2% lidocaine and 0.75% Marcaine was injected in a retrobulbar fashion behind the operative eye. The patient was then prepped and draped in the usual sterile fashion.  Eyelashes were draped out of the operative field and a stainless steel eyelid speculum was placed in the operative eye.  A time-out was performed verifying correct patient, procedure, site, positioning and special equipment prior to starting the case.      A 25-gauge microcannula was placed in the inferotemporal quadrant in a beveled fashion 3.5 mm posterior to the limbus. The infusion cannula was cleared of air, inserted into the microcannula, confirmed to be in the correct position within the vitreous cavity by direct visualization through the dilated pupil with the light pipe and then turned on under direct visualization. Two additional microcannulas were placed superonasally and superotemporally in a similar fashion. The vitrectomy hand piece and light were then inserted into the eye and the anterior vitreous was cleared under direct visualization.       Then, under indirect wide field visualization, a core and peripheral vitrectomy was performed. The posterior hyaloid was  using active aspiration and Kenalog assistance.  The vitreous was excised 360 degrees to the posterior vitreous base and no retinal breaks were noted.      Attention was then turned to the macular region. Using a macular lens, TissueBlue was to stain the internal limiting membrane.  Using a pinch and peel technique, the ILM was removed from arcade to arcade without complication.  Following the ILM peel, 360 degree scleral indentation was performed.  No holes, tears, or detachments were identified.       An air-fluid exchange was performed.  Following the air-fluid exchange, the superonasal microcannula was removed.  22% SF6gas was drawn up by the attending physician and an air-gas exchange was performed. The remaining microcannulas were removed from the eye. All sclerotomies remained airtight and the eye remained at a physiologic pressure by palpation.     The eyelid speculum was removed from the eye. Betadine was cleaned from around the eye. A topical steroid/antibiotic cream was placed on the eye, followed by a patch and a clear plastic shield. The patient tolerated the procedure well and left the operating room in stable condition.

## 2025-05-22 NOTE — ASU DISCHARGE PLAN (ADULT/PEDIATRIC) - NS MD DC FALL RISK RISK
For information on Fall & Injury Prevention, visit: [email or web address] OR  [email or web address] OR  https://www.cdc.gov/steadi/patient.html

## 2025-05-23 ENCOUNTER — APPOINTMENT (OUTPATIENT)
Dept: OPHTHALMOLOGY | Facility: CLINIC | Age: 65
End: 2025-05-23
Payer: MEDICARE

## 2025-05-23 ENCOUNTER — NON-APPOINTMENT (OUTPATIENT)
Age: 65
End: 2025-05-23

## 2025-05-23 PROBLEM — H91.90 UNSPECIFIED HEARING LOSS, UNSPECIFIED EAR: Chronic | Status: ACTIVE | Noted: 2025-05-22

## 2025-05-23 PROCEDURE — 99024 POSTOP FOLLOW-UP VISIT: CPT

## 2025-05-27 ENCOUNTER — NON-APPOINTMENT (OUTPATIENT)
Age: 65
End: 2025-05-27

## 2025-05-27 ENCOUNTER — APPOINTMENT (OUTPATIENT)
Dept: OPHTHALMOLOGY | Facility: CLINIC | Age: 65
End: 2025-05-27
Payer: MEDICARE

## 2025-05-27 PROCEDURE — 99024 POSTOP FOLLOW-UP VISIT: CPT

## 2025-06-06 ENCOUNTER — NON-APPOINTMENT (OUTPATIENT)
Age: 65
End: 2025-06-06

## 2025-06-06 ENCOUNTER — APPOINTMENT (OUTPATIENT)
Dept: OPHTHALMOLOGY | Facility: CLINIC | Age: 65
End: 2025-06-06
Payer: MEDICARE

## 2025-06-06 PROCEDURE — 99024 POSTOP FOLLOW-UP VISIT: CPT

## 2025-06-11 ENCOUNTER — NON-APPOINTMENT (OUTPATIENT)
Age: 65
End: 2025-06-11

## 2025-06-11 ENCOUNTER — APPOINTMENT (OUTPATIENT)
Dept: OPHTHALMOLOGY | Facility: CLINIC | Age: 65
End: 2025-06-11
Payer: MEDICARE

## 2025-06-11 PROCEDURE — 99024 POSTOP FOLLOW-UP VISIT: CPT

## 2025-06-27 ENCOUNTER — APPOINTMENT (OUTPATIENT)
Dept: OPHTHALMOLOGY | Facility: CLINIC | Age: 65
End: 2025-06-27
Payer: MEDICARE

## 2025-06-27 ENCOUNTER — NON-APPOINTMENT (OUTPATIENT)
Age: 65
End: 2025-06-27

## 2025-06-27 PROCEDURE — 92134 CPTRZ OPH DX IMG PST SGM RTA: CPT

## 2025-06-27 PROCEDURE — 99024 POSTOP FOLLOW-UP VISIT: CPT

## 2025-07-09 ENCOUNTER — NON-APPOINTMENT (OUTPATIENT)
Age: 65
End: 2025-07-09

## 2025-07-10 ENCOUNTER — APPOINTMENT (OUTPATIENT)
Dept: ORTHOPEDIC SURGERY | Facility: CLINIC | Age: 65
End: 2025-07-10

## 2025-07-10 PROBLEM — M65.969 SYNOVITIS OF KNEE: Status: ACTIVE | Noted: 2025-07-10

## 2025-07-10 PROCEDURE — 99204 OFFICE O/P NEW MOD 45 MIN: CPT | Mod: 25

## 2025-07-10 PROCEDURE — 20611 DRAIN/INJ JOINT/BURSA W/US: CPT | Mod: 50

## 2025-07-10 RX ORDER — IBUPROFEN 800 MG/1
800 TABLET ORAL 3 TIMES DAILY
Qty: 90 | Refills: 5 | Status: ACTIVE | COMMUNITY
Start: 2025-07-10 | End: 1900-01-01

## 2025-07-10 RX ORDER — HYALURONATE SODIUM, STABILIZED 88 MG/4 ML
88 SYRINGE (ML) INTRAARTICULAR
Qty: 2 | Refills: 0 | Status: ACTIVE | OUTPATIENT
Start: 2025-07-10

## 2025-07-21 NOTE — PRE-ANESTHESIA EVALUATION ADULT - NSPREOPDXFT_GEN_ALL_CORE
Labs drawn from right antecubital area. 23 gauge WONC used. Gauze and Tape/Band-Aid dressing applied. Site appears clean dry and intact. Patient tolerated procedure well, no adverse reactions noted. Instructed patient to call with any questions or concerns.     Next appointment scheduled:   Future Appointments   Date Time Provider Department Center   7/28/2025  8:00 AM Rain Sibley PA-C [facility]    7/28/2025  9:45 AM SDT VL LAB SDTON1 SDT   7/28/2025 10:00 AM SDT VL TREATMENT CHAIR SDTON1 SD   8/15/2025  8:30 AM [facility]   9/12/2025  9:30 AM Joseph Sharp PA-C [facility]   9/18/2025  3:30 PM [facility]   9/29/2025  3:00 PM Chastity Pretty APNP SDTPU2 SDT         Patient Discharged: Pt discharged to home per self, ambulatory.    
Macular Hole Right Eye

## 2025-07-23 ENCOUNTER — APPOINTMENT (OUTPATIENT)
Dept: ORTHOPEDIC SURGERY | Facility: CLINIC | Age: 65
End: 2025-07-23
Payer: MEDICARE

## 2025-07-23 DIAGNOSIS — M25.462 EFFUSION, LEFT KNEE: ICD-10-CM

## 2025-07-23 DIAGNOSIS — M17.0 BILATERAL PRIMARY OSTEOARTHRITIS OF KNEE: ICD-10-CM

## 2025-07-23 PROCEDURE — 99214 OFFICE O/P EST MOD 30 MIN: CPT

## 2025-07-28 ENCOUNTER — NON-APPOINTMENT (OUTPATIENT)
Age: 65
End: 2025-07-28

## 2025-07-28 ENCOUNTER — APPOINTMENT (OUTPATIENT)
Dept: OPHTHALMOLOGY | Facility: CLINIC | Age: 65
End: 2025-07-28
Payer: MEDICARE

## 2025-07-28 PROCEDURE — 92134 CPTRZ OPH DX IMG PST SGM RTA: CPT

## 2025-07-28 PROCEDURE — 99024 POSTOP FOLLOW-UP VISIT: CPT

## 2025-08-14 ENCOUNTER — APPOINTMENT (OUTPATIENT)
Dept: OPHTHALMOLOGY | Facility: CLINIC | Age: 65
End: 2025-08-14

## 2025-08-29 ENCOUNTER — EMERGENCY (EMERGENCY)
Facility: HOSPITAL | Age: 65
LOS: 1 days | End: 2025-08-29
Attending: STUDENT IN AN ORGANIZED HEALTH CARE EDUCATION/TRAINING PROGRAM | Admitting: STUDENT IN AN ORGANIZED HEALTH CARE EDUCATION/TRAINING PROGRAM
Payer: MEDICARE

## 2025-08-29 VITALS
DIASTOLIC BLOOD PRESSURE: 90 MMHG | WEIGHT: 145.06 LBS | HEART RATE: 64 BPM | RESPIRATION RATE: 18 BRPM | HEIGHT: 59 IN | SYSTOLIC BLOOD PRESSURE: 180 MMHG | TEMPERATURE: 98 F | OXYGEN SATURATION: 98 %

## 2025-08-29 DIAGNOSIS — Z90.49 ACQUIRED ABSENCE OF OTHER SPECIFIED PARTS OF DIGESTIVE TRACT: Chronic | ICD-10-CM

## 2025-08-29 DIAGNOSIS — Z98.890 OTHER SPECIFIED POSTPROCEDURAL STATES: Chronic | ICD-10-CM

## 2025-08-29 DIAGNOSIS — Z90.89 ACQUIRED ABSENCE OF OTHER ORGANS: Chronic | ICD-10-CM

## 2025-08-29 DIAGNOSIS — Z98.49 CATARACT EXTRACTION STATUS, UNSPECIFIED EYE: Chronic | ICD-10-CM

## 2025-08-29 DIAGNOSIS — Z98.51 TUBAL LIGATION STATUS: Chronic | ICD-10-CM

## 2025-08-29 DIAGNOSIS — Z98.891 HISTORY OF UTERINE SCAR FROM PREVIOUS SURGERY: Chronic | ICD-10-CM

## 2025-08-29 PROCEDURE — 99283 EMERGENCY DEPT VISIT LOW MDM: CPT

## 2025-08-29 PROCEDURE — 99284 EMERGENCY DEPT VISIT MOD MDM: CPT

## 2025-08-29 RX ORDER — OXYCODONE HYDROCHLORIDE 30 MG/1
30 TABLET ORAL ONCE
Refills: 0 | Status: DISCONTINUED | OUTPATIENT
Start: 2025-08-29 | End: 2025-08-29

## 2025-08-29 RX ADMIN — OXYCODONE HYDROCHLORIDE 30 MILLIGRAM(S): 30 TABLET ORAL at 11:18

## 2025-09-01 DIAGNOSIS — M54.9 DORSALGIA, UNSPECIFIED: ICD-10-CM

## 2025-09-01 DIAGNOSIS — G89.29 OTHER CHRONIC PAIN: ICD-10-CM

## 2025-09-01 DIAGNOSIS — Z88.8 ALLERGY STATUS TO OTHER DRUGS, MEDICAMENTS AND BIOLOGICAL SUBSTANCES: ICD-10-CM

## 2025-09-09 ENCOUNTER — EMERGENCY (EMERGENCY)
Facility: HOSPITAL | Age: 65
LOS: 1 days | End: 2025-09-09
Attending: EMERGENCY MEDICINE | Admitting: EMERGENCY MEDICINE
Payer: MEDICARE

## 2025-09-09 VITALS
WEIGHT: 141.98 LBS | RESPIRATION RATE: 17 BRPM | HEART RATE: 81 BPM | OXYGEN SATURATION: 99 % | SYSTOLIC BLOOD PRESSURE: 201 MMHG | TEMPERATURE: 98 F | HEIGHT: 59 IN | DIASTOLIC BLOOD PRESSURE: 105 MMHG

## 2025-09-09 VITALS
OXYGEN SATURATION: 96 % | RESPIRATION RATE: 18 BRPM | DIASTOLIC BLOOD PRESSURE: 90 MMHG | TEMPERATURE: 97 F | HEART RATE: 67 BPM | SYSTOLIC BLOOD PRESSURE: 160 MMHG

## 2025-09-09 DIAGNOSIS — Z98.890 OTHER SPECIFIED POSTPROCEDURAL STATES: Chronic | ICD-10-CM

## 2025-09-09 DIAGNOSIS — Z98.51 TUBAL LIGATION STATUS: Chronic | ICD-10-CM

## 2025-09-09 DIAGNOSIS — Z90.89 ACQUIRED ABSENCE OF OTHER ORGANS: Chronic | ICD-10-CM

## 2025-09-09 DIAGNOSIS — Z90.49 ACQUIRED ABSENCE OF OTHER SPECIFIED PARTS OF DIGESTIVE TRACT: Chronic | ICD-10-CM

## 2025-09-09 DIAGNOSIS — Z98.49 CATARACT EXTRACTION STATUS, UNSPECIFIED EYE: Chronic | ICD-10-CM

## 2025-09-09 DIAGNOSIS — Z98.891 HISTORY OF UTERINE SCAR FROM PREVIOUS SURGERY: Chronic | ICD-10-CM

## 2025-09-09 LAB
ANION GAP SERPL CALC-SCNC: 8 MMOL/L — SIGNIFICANT CHANGE UP (ref 5–17)
BASOPHILS # BLD AUTO: 0.03 K/UL — SIGNIFICANT CHANGE UP (ref 0–0.2)
BASOPHILS NFR BLD AUTO: 0.4 % — SIGNIFICANT CHANGE UP (ref 0–2)
BUN SERPL-MCNC: 15 MG/DL — SIGNIFICANT CHANGE UP (ref 7–23)
CALCIUM SERPL-MCNC: 9.5 MG/DL — SIGNIFICANT CHANGE UP (ref 8.4–10.5)
CHLORIDE SERPL-SCNC: 112 MMOL/L — HIGH (ref 96–108)
CO2 SERPL-SCNC: 24 MMOL/L — SIGNIFICANT CHANGE UP (ref 22–31)
CREAT SERPL-MCNC: 0.7 MG/DL — SIGNIFICANT CHANGE UP (ref 0.5–1.3)
EGFR: 95 ML/MIN/1.73M2 — SIGNIFICANT CHANGE UP
EGFR: 95 ML/MIN/1.73M2 — SIGNIFICANT CHANGE UP
EOSINOPHIL # BLD AUTO: 0.1 K/UL — SIGNIFICANT CHANGE UP (ref 0–0.5)
EOSINOPHIL NFR BLD AUTO: 1.2 % — SIGNIFICANT CHANGE UP (ref 0–6)
GLUCOSE SERPL-MCNC: 78 MG/DL — SIGNIFICANT CHANGE UP (ref 70–99)
HCT VFR BLD CALC: 39.9 % — SIGNIFICANT CHANGE UP (ref 34.5–45)
HGB BLD-MCNC: 13.1 G/DL — SIGNIFICANT CHANGE UP (ref 11.5–15.5)
IMM GRANULOCYTES # BLD AUTO: 0.02 K/UL — SIGNIFICANT CHANGE UP (ref 0–0.07)
IMM GRANULOCYTES NFR BLD AUTO: 0.2 % — SIGNIFICANT CHANGE UP (ref 0–0.9)
LYMPHOCYTES # BLD AUTO: 2.69 K/UL — SIGNIFICANT CHANGE UP (ref 1–3.3)
LYMPHOCYTES NFR BLD AUTO: 32.1 % — SIGNIFICANT CHANGE UP (ref 13–44)
MAGNESIUM SERPL-MCNC: 1.7 MG/DL — SIGNIFICANT CHANGE UP (ref 1.6–2.6)
MCHC RBC-ENTMCNC: 31 PG — SIGNIFICANT CHANGE UP (ref 27–34)
MCHC RBC-ENTMCNC: 32.8 G/DL — SIGNIFICANT CHANGE UP (ref 32–36)
MCV RBC AUTO: 94.3 FL — SIGNIFICANT CHANGE UP (ref 80–100)
MONOCYTES # BLD AUTO: 0.77 K/UL — SIGNIFICANT CHANGE UP (ref 0–0.9)
MONOCYTES NFR BLD AUTO: 9.2 % — SIGNIFICANT CHANGE UP (ref 2–14)
NEUTROPHILS # BLD AUTO: 4.78 K/UL — SIGNIFICANT CHANGE UP (ref 1.8–7.4)
NEUTROPHILS NFR BLD AUTO: 56.9 % — SIGNIFICANT CHANGE UP (ref 43–77)
NRBC # BLD AUTO: 0 K/UL — SIGNIFICANT CHANGE UP (ref 0–0)
NRBC # FLD: 0 K/UL — SIGNIFICANT CHANGE UP (ref 0–0)
NRBC BLD AUTO-RTO: 0 /100 WBCS — SIGNIFICANT CHANGE UP (ref 0–0)
PLATELET # BLD AUTO: 152 K/UL — SIGNIFICANT CHANGE UP (ref 150–400)
PMV BLD: 12.4 FL — SIGNIFICANT CHANGE UP (ref 7–13)
POTASSIUM SERPL-MCNC: 4.2 MMOL/L — SIGNIFICANT CHANGE UP (ref 3.5–5.3)
POTASSIUM SERPL-SCNC: 4.2 MMOL/L — SIGNIFICANT CHANGE UP (ref 3.5–5.3)
RBC # BLD: 4.23 M/UL — SIGNIFICANT CHANGE UP (ref 3.8–5.2)
RBC # FLD: 14 % — SIGNIFICANT CHANGE UP (ref 10.3–14.5)
SODIUM SERPL-SCNC: 144 MMOL/L — SIGNIFICANT CHANGE UP (ref 135–145)
WBC # BLD: 8.39 K/UL — SIGNIFICANT CHANGE UP (ref 3.8–10.5)
WBC # FLD AUTO: 8.39 K/UL — SIGNIFICANT CHANGE UP (ref 3.8–10.5)

## 2025-09-09 PROCEDURE — 36415 COLL VENOUS BLD VENIPUNCTURE: CPT

## 2025-09-09 PROCEDURE — 99284 EMERGENCY DEPT VISIT MOD MDM: CPT | Mod: 25

## 2025-09-09 PROCEDURE — 96374 THER/PROPH/DIAG INJ IV PUSH: CPT

## 2025-09-09 PROCEDURE — 80048 BASIC METABOLIC PNL TOTAL CA: CPT

## 2025-09-09 PROCEDURE — 99284 EMERGENCY DEPT VISIT MOD MDM: CPT

## 2025-09-09 PROCEDURE — 83735 ASSAY OF MAGNESIUM: CPT

## 2025-09-09 PROCEDURE — 96375 TX/PRO/DX INJ NEW DRUG ADDON: CPT

## 2025-09-09 PROCEDURE — 85025 COMPLETE CBC W/AUTO DIFF WBC: CPT

## 2025-09-09 PROCEDURE — 72100 X-RAY EXAM L-S SPINE 2/3 VWS: CPT

## 2025-09-09 PROCEDURE — 72100 X-RAY EXAM L-S SPINE 2/3 VWS: CPT | Mod: 26

## 2025-09-09 RX ORDER — ONDANSETRON HCL/PF 4 MG/2 ML
4 VIAL (ML) INJECTION ONCE
Refills: 0 | Status: COMPLETED | OUTPATIENT
Start: 2025-09-09 | End: 2025-09-09

## 2025-09-09 RX ORDER — OXYCODONE HYDROCHLORIDE 30 MG/1
30 TABLET ORAL ONCE
Refills: 0 | Status: DISCONTINUED | OUTPATIENT
Start: 2025-09-09 | End: 2025-09-09

## 2025-09-09 RX ADMIN — OXYCODONE HYDROCHLORIDE 30 MILLIGRAM(S): 30 TABLET ORAL at 14:13

## 2025-09-09 RX ADMIN — Medication 4 MILLIGRAM(S): at 12:33

## 2025-09-09 RX ADMIN — Medication 2000 MILLILITER(S): at 11:58

## 2025-09-09 RX ADMIN — Medication 4 MILLIGRAM(S): at 11:59

## 2025-09-11 DIAGNOSIS — R11.2 NAUSEA WITH VOMITING, UNSPECIFIED: ICD-10-CM

## 2025-09-11 DIAGNOSIS — G89.29 OTHER CHRONIC PAIN: ICD-10-CM

## 2025-09-11 DIAGNOSIS — M54.50 LOW BACK PAIN, UNSPECIFIED: ICD-10-CM

## 2025-09-11 DIAGNOSIS — Z88.8 ALLERGY STATUS TO OTHER DRUGS, MEDICAMENTS AND BIOLOGICAL SUBSTANCES: ICD-10-CM

## 2025-09-11 DIAGNOSIS — T40.2X6A UNDERDOSING OF OTHER OPIOIDS, INITIAL ENCOUNTER: ICD-10-CM

## 2025-09-18 ENCOUNTER — TRANSCRIPTION ENCOUNTER (OUTPATIENT)
Age: 65
End: 2025-09-18

## (undated) DEVICE — SUT ETHILON 3-0 18" PS-1

## (undated) DEVICE — DRSG DERMABOND PRINEO 22CM

## (undated) DEVICE — SUT MONOCRYL 3-0 18" PS-1

## (undated) DEVICE — PACK VITRECTOMY  LF

## (undated) DEVICE — STRYKER BONE MILL BLADE FINE 3.2MM

## (undated) DEVICE — NUCLEUS HYDRODISSECTOR PEARCE ANGLED 25G X 22MM

## (undated) DEVICE — SUT VICRYL 3-0 27" SH UNDYED

## (undated) DEVICE — ILM FORCEP 25G PLUS

## (undated) DEVICE — DRAPE 3/4 SHEET 52X76"

## (undated) DEVICE — DRAIN JACKSON PRATT 10MM FLAT 3/4 NO TROCAR

## (undated) DEVICE — DRAPE MICROSCOPE KNOB COVER SMALL (2 PCS)

## (undated) DEVICE — MILLEX HA 45UM

## (undated) DEVICE — COVER BACK TABLE STRL 80/110IN 10/CA

## (undated) DEVICE — SUT ETHILON 6-0 18" PS-3

## (undated) DEVICE — DRAPE SURGICAL #1010

## (undated) DEVICE — MIDAS REX MR8 METAL CUTTER 3MM X 9CM

## (undated) DEVICE — ELCTR AQUAMANTYS BIPOLAR SEALER 6.0

## (undated) DEVICE — KIT CENTURION ANTERIOR

## (undated) DEVICE — NDL CHRONOS BMA 11GA

## (undated) DEVICE — DRSG MASTISOL

## (undated) DEVICE — MARKING PEN W RULER

## (undated) DEVICE — DRAPE C ARM C-ARMOUR

## (undated) DEVICE — CANNULA BLUNT TIP 25GA

## (undated) DEVICE — SUT VICRYL PLUS 2-0 18" CT-2 (POP-OFF)

## (undated) DEVICE — NDL HYPO SAFE 22G X 1.5" (BLACK)

## (undated) DEVICE — PACK CENTURION 2.4MM

## (undated) DEVICE — TEMPLATE 18H

## (undated) DEVICE — FOLEY TRAY 16FR LF URINE METER SURESTEP

## (undated) DEVICE — PACK SPINE

## (undated) DEVICE — STAPLER SKIN PROXIMATE

## (undated) DEVICE — DRAPE C ARM 41X74"

## (undated) DEVICE — Device

## (undated) DEVICE — SUT SILK 2-0 12-18"

## (undated) DEVICE — BACKFLUSH SOFT TIP 25G

## (undated) DEVICE — CANNULA MEDONE DUAL BORE SIDEFLO 25G

## (undated) DEVICE — SOL IRR BAG BSS 500ML

## (undated) DEVICE — MIDAS REX MR8 MATCH HEAD FLUTED LG BORE 3MM X 14CM

## (undated) DEVICE — SYR FILTER 22 MICRONS

## (undated) DEVICE — MIDAS REX MR8 MATCH HEAD FLUTED SM BORE 2.2MM X 10CM

## (undated) DEVICE — DRAPE 1/2 SHEET 40X57"

## (undated) DEVICE — WARMING BLANKET LOWER ADULT

## (undated) DEVICE — DRAPE INSTRUMENT POUCH 6.75" X 11"

## (undated) DEVICE — SUT NYLON 10-0 12" CU-5

## (undated) DEVICE — SUT VICRYL 2-0 27" SH UNDYED

## (undated) DEVICE — SPONGE SURGICAL STRIP 1/4 X 6"

## (undated) DEVICE — LENS VOLK1 FLAT 1X STRL

## (undated) DEVICE — SPONGE SURGICAL STRIP 1/2 X 6"

## (undated) DEVICE — VENODYNE/SCD SLEEVE CALF MEDIUM

## (undated) DEVICE — TRANSFORMER INTREPID I/A 0.3MM

## (undated) DEVICE — SYR LUER LOK 50CC

## (undated) DEVICE — PACK ANTERIOR SEGMENT

## (undated) DEVICE — PICK MICRO .5MM 25G

## (undated) DEVICE — KNIFE ALCON MVR V-LANCE 20G (WHITE)

## (undated) DEVICE — SYR LUER LOK 1CC

## (undated) DEVICE — ILM RESOLUTION FORCEP 25G DISP

## (undated) DEVICE — SPONGE PEANUT AUTO COUNT

## (undated) DEVICE — SOL IRR SALT BSS PLUS 500ML

## (undated) DEVICE — KNIFE ALCON PARACENTESIS CLEARCUT SIDEPORT 1MM (YELLOW)

## (undated) DEVICE — MIDAS REX MR8 BALL FLUTED SM BORE 5MM X 10CM

## (undated) DEVICE — DRSG TEGADERM 4X4.75"

## (undated) DEVICE — NDL SPINAL 18G X 3.5" (PINK)

## (undated) DEVICE — CANNULA SURGICAL SPECIALTIES DUAL BORE 25G

## (undated) DEVICE — WARMING BLANKET UPPER ADULT

## (undated) DEVICE — DRAPE VARI-LENS2 MICROSCPOPE 68MM

## (undated) DEVICE — SUT MONOCRYL 3-0 27" PS-2 UNDYED

## (undated) DEVICE — SUT VICRYL 0 18" CT-1 UNDYED (POP-OFF)

## (undated) DEVICE — ILM FORCEP 25G

## (undated) DEVICE — APPLICATOR COTTON TIP 3" STERILE

## (undated) DEVICE — GLV 6.5 PROTEXIS (WHITE)

## (undated) DEVICE — SUT VICRYL 8-0 12" TG140-8 DA

## (undated) DEVICE — DRSG STERISTRIPS 0.5 X 4"